# Patient Record
Sex: MALE | Race: WHITE
[De-identification: names, ages, dates, MRNs, and addresses within clinical notes are randomized per-mention and may not be internally consistent; named-entity substitution may affect disease eponyms.]

---

## 2017-11-07 ENCOUNTER — HOSPITAL ENCOUNTER (INPATIENT)
Dept: HOSPITAL 74 - JER | Age: 62
LOS: 7 days | Discharge: HOME HEALTH | DRG: 463 | End: 2017-11-14
Attending: FAMILY MEDICINE | Admitting: FAMILY MEDICINE
Payer: COMMERCIAL

## 2017-11-07 VITALS — BODY MASS INDEX: 19 KG/M2

## 2017-11-07 DIAGNOSIS — N39.0: Primary | ICD-10-CM

## 2017-11-07 DIAGNOSIS — N18.9: ICD-10-CM

## 2017-11-07 DIAGNOSIS — N17.9: ICD-10-CM

## 2017-11-07 DIAGNOSIS — L89.302: ICD-10-CM

## 2017-11-07 DIAGNOSIS — E11.22: ICD-10-CM

## 2017-11-07 DIAGNOSIS — E11.59: ICD-10-CM

## 2017-11-07 DIAGNOSIS — R33.9: ICD-10-CM

## 2017-11-07 DIAGNOSIS — L03.90: ICD-10-CM

## 2017-11-07 DIAGNOSIS — M47.14: ICD-10-CM

## 2017-11-07 DIAGNOSIS — E43: ICD-10-CM

## 2017-11-07 DIAGNOSIS — G47.00: ICD-10-CM

## 2017-11-07 DIAGNOSIS — B19.20: ICD-10-CM

## 2017-11-07 DIAGNOSIS — E78.5: ICD-10-CM

## 2017-11-07 DIAGNOSIS — M89.9: ICD-10-CM

## 2017-11-07 DIAGNOSIS — I12.9: ICD-10-CM

## 2017-11-07 DIAGNOSIS — F17.210: ICD-10-CM

## 2017-11-07 DIAGNOSIS — A49.02: ICD-10-CM

## 2017-11-07 LAB
ALBUMIN SERPL-MCNC: 3 G/DL (ref 3.4–5)
ALP SERPL-CCNC: 153 U/L (ref 45–117)
ALT SERPL-CCNC: 12 U/L (ref 12–78)
ANION GAP SERPL CALC-SCNC: 7 MMOL/L (ref 8–16)
APPEARANCE UR: (no result)
AST SERPL-CCNC: 14 U/L (ref 15–37)
BACTERIA #/AREA URNS HPF: (no result) /HPF
BASOPHILS # BLD: 0.9 % (ref 0–2)
BILIRUB SERPL-MCNC: 0.2 MG/DL (ref 0.2–1)
BILIRUB UR STRIP.AUTO-MCNC: NEGATIVE MG/DL
CALCIUM SERPL-MCNC: 8.6 MG/DL (ref 8.5–10.1)
CO2 SERPL-SCNC: 28 MMOL/L (ref 21–32)
COLOR UR: YELLOW
CREAT SERPL-MCNC: 1.8 MG/DL (ref 0.7–1.3)
DEPRECATED RDW RBC AUTO: 14 % (ref 11.9–15.9)
EOSINOPHIL # BLD: 1.5 % (ref 0–4.5)
GLUCOSE SERPL-MCNC: 157 MG/DL (ref 74–106)
KETONES UR QL STRIP: NEGATIVE
LEUKOCYTE ESTERASE UR QL STRIP.AUTO: (no result)
MCH RBC QN AUTO: 29.4 PG (ref 25.7–33.7)
MCHC RBC AUTO-ENTMCNC: 34.1 G/DL (ref 32–35.9)
MCV RBC: 86.3 FL (ref 80–96)
NEUTROPHILS # BLD: 63 % (ref 42.8–82.8)
NITRITE UR QL STRIP: NEGATIVE
PH UR: 6 [PH] (ref 5–8)
PLATELET # BLD AUTO: 233 K/MM3 (ref 134–434)
PMV BLD: 8 FL (ref 7.5–11.1)
PROT SERPL-MCNC: 7.2 G/DL (ref 6.4–8.2)
PROT UR QL STRIP: (no result)
PROT UR QL STRIP: NEGATIVE
RBC # UR STRIP: (no result) /UL
SP GR UR: 1.02 (ref 1–1.03)
UROBILINOGEN UR STRIP-MCNC: NEGATIVE MG/DL (ref 0.2–1)
WBC # BLD AUTO: 7.6 K/MM3 (ref 4–10)
WBC # UR AUTO: 70 /HPF (ref 3–5)
YEAST #/AREA URNS HPF: (no result) /[HPF]

## 2017-11-07 PROCEDURE — G0480 DRUG TEST DEF 1-7 CLASSES: HCPCS

## 2017-11-07 PROCEDURE — G0009 ADMIN PNEUMOCOCCAL VACCINE: HCPCS

## 2017-11-07 PROCEDURE — A9503 TC99M MEDRONATE: HCPCS

## 2017-11-07 RX ADMIN — ATORVASTATIN CALCIUM SCH MG: 10 TABLET, FILM COATED ORAL at 22:27

## 2017-11-07 RX ADMIN — POTASSIUM & SODIUM PHOSPHATES POWDER PACK 280-160-250 MG SCH PACKET: 280-160-250 PACK at 22:28

## 2017-11-07 RX ADMIN — HEPARIN SODIUM SCH UNIT: 5000 INJECTION, SOLUTION INTRAVENOUS; SUBCUTANEOUS at 22:27

## 2017-11-07 RX ADMIN — POTASSIUM & SODIUM PHOSPHATES POWDER PACK 280-160-250 MG SCH: 280-160-250 PACK at 22:54

## 2017-11-07 RX ADMIN — INSULIN DETEMIR SCH UNITS: 100 INJECTION, SOLUTION SUBCUTANEOUS at 22:27

## 2017-11-07 RX ADMIN — MAGNESIUM OXIDE TAB 400 MG (241.3 MG ELEMENTAL MG) SCH MG: 400 (241.3 MG) TAB at 22:28

## 2017-11-07 RX ADMIN — DOCUSATE SODIUM SCH MG: 100 CAPSULE, LIQUID FILLED ORAL at 22:27

## 2017-11-07 RX ADMIN — METHOCARBAMOL SCH MG: 500 TABLET ORAL at 22:28

## 2017-11-07 RX ADMIN — INSULIN ASPART SCH: 100 INJECTION, SOLUTION INTRAVENOUS; SUBCUTANEOUS at 22:26

## 2017-11-07 RX ADMIN — SODIUM CHLORIDE SCH MLS/HR: 9 INJECTION, SOLUTION INTRAVENOUS at 22:26

## 2017-11-07 RX ADMIN — PREGABALIN SCH MG: 50 CAPSULE ORAL at 22:27

## 2017-11-07 NOTE — PDOC
History of Present Illness





- General


Chief Complaint: Revisit, Lab Variance


Stated Complaint: PCP SENT FOR ADMIN


Time Seen by Provider: 11/07/17 13:35





- History of Present Illness


Initial Comments: 





11/07/17 14:25


The patient is a 62 year old male with a history of testicular ca, HTN, DM, HLD 

who presents from his primary care provider's office for admission.  The 

patient reports that he was sent from his primary's office for admission due to 

a UTI and creatinine elevation.  The patient was seen in the ER 1 day ago, but 

left AMA before the ED provider could get in contact with the patient's primary 

Dr. Noah Jimenes.  The patient is currently complaining of generalized abdominal 

pain worse in the suprapubic region.  He has a suprapubic catheter in place.  

CT scan performed 1 day ago in the ED demonstrated concern for possible 

metastatic disease.  He denies any fevers, chills, SOB, chest pain, nausea or 

vomiting.





Past History





- Past Medical History


Allergies/Adverse Reactions: 


 Allergies











Allergy/AdvReac Type Severity Reaction Status Date / Time


 


No Known Allergies Allergy   Verified 11/07/17 13:31











Home Medications: 


Ambulatory Orders





Lidocaine 5% Patch [Lidoderm -] 1 patch TP DAILY #14 patch 01/06/14 


Pregabalin [Lyrica -] 200 mg PO TID 12/04/14 


Amlodipine Besylate [Norvasc -] 10 mg PO DAILY 10/30/15 


Acetaminophen [Tylenol .Regular Strength -] 650 mg PO Q6H PRN #0 tablet 02/11/ 16 


Atorvastatin Ca [Lipitor] 10 mg PO HS #0 tablet 02/11/16 


Docusate Sodium [Colace -] 300 mg PO HS #0 capsule 02/11/16 


Magnesium Oxide [Mag-Ox -] 400 mg PO BID #0 tablet 02/11/16 


Methadone [Dolophine -] 90 mg PO DAILY@0600 #0 tablet 02/11/16 


Multivitamins [Multivit ([facility] Formul[facility])] 1 tab PO DAILY #0 tab 02/11/16 


Na Biphos/K Phos (Neutra-Phos) [Neutra-Phos Liquid -] 1 pkt PO BID #0 packet 02/ 11/16 


Insulin Glargine,Hum.rec.anlog [Lantus Solostar PEN (NF)] 35 units SQ HS 05/04/ 16 


Oxybutynin Chloride [Oxybutynin Chloride ER] 5 mg PO DAILY 05/04/16 


Methocarbamol [Robaxin -] 750 mg PO BID 05/31/16 


Cephalexin Monohydrate [Keflex -] 500 mg PO Q8H #21 capsule 11/07/17 








Anemia: No


Asthma: No


Cancer: Yes (TESTICULAR)


Cardiac Disorders: Yes


COPD: Yes


CHF: No


Diabetes: Yes


GI Disorders: No


 Disorders: Yes (suprapubic tube )


HTN: Yes


Liver Disease: No


Seizures: No





- Surgical History


Orthopedic Surgery: Yes (back surgery 2006)





- Immunization History


Td Vaccination: Yes


TDAP Vaccination: Yes


Immunization Up to Date: Yes





- Suicide/Smoking/Psychosocial Hx


Smoking Status: Yes


Smoking History: Current every day smoker


Years of Tobacco Use: 0


Have you smoked in the past 12 months: Yes


Number of Cigarettes Smoked Daily: 10


Cigars Per Day: 2


Information on smoking cessation initiated: No


'Breaking Loose' booklet given: 11/25/15


Hx Alcohol Use: No


Drug/Substance Use Hx: No


Substance Use Type: Alcohol, Cocaine, Marijuana


Hx Substance Use Treatment: Yes (detox, rehab, [facility])





**Review of Systems





- Review of Systems


Comments:: 





11/07/17 14:30


Constitutional: No fevers, chills, fatigue, malaise


HEENT: No Rhinorrhea, nasal congestion,


Cardiovascular: No chest pain, syncope, palpitations, lightheadedness


Respiratory: No Cough, SOB, Hemoptysis,


Gastrointestinal: Abdominal discomfort.  No Nausea, Vomiting, Constipation, 

Diarrhea, Melena


Genitourinary: No Dysuria, Frequency, Urgency, Hesitancy, Hematuria, Flank pain


Musculoskeletal: No Myalgia, arthralgia


Skin: No rashes, bruising, pallor


Neurologic: No Headache, Dizziness, Numbness, Weakness, or Tingling


Psychiatric: No Hallucinations. No SI or HI








*Physical Exam





- Vital Signs


 Last Vital Signs











Temp Pulse Resp BP Pulse Ox


 


 98.9 F   90   18   136/73   98 


 


 11/07/17 13:31  11/07/17 13:31  11/07/17 13:31  11/07/17 13:31  11/07/17 13:31














- Physical Exam


Comments: 





11/07/17 14:32


General Appearance: Nourished. No Apparent Distress


HEENT: EOMI, MORRO. No Pharyngeal Erythema, Tonsillar Exudate, Tonsillar Erythema


Neck: No Cervical Lymphadenopathy


Respiratory/Chest: Lungs Clear, Normal Breath Sounds. No Crackles, Rales, 

Rhonchi, Wheezing


Cardiovascular: Regular Rhythm, Regular Rate. No Murmur, Gallops, Rubs


Gastrointestinal/Abdominal: Normal Bowel Sounds, Soft. Mild diffuse tenderness 

to palpation.  Suprapubic catheter in place.  4cm superfical ulcerated wound to 

the abdomen.  No Guarding, Rebound, 


Musculoskeletal: No CVA Tenderness


Extremity: Normal Capillary Refill


Integumentary: Normal Color, Dry, Warm


Neurologic:  Fully Oriented, Alert, Normal Mood/Affect, Normal Response, 





**Heart Score/ECG Review


  ** #1


ECG reviewed & interpreted by me at: 16:32


General ECG Interpretation: Sinus Rhythm, Normal Rate, Normal Intervals, No 

acute ischemic changes





ED Treatment Course





- LABORATORY


CBC & Chemistry Diagram: 


 11/07/17 14:45





 11/07/17 14:45





Medical Decision Making





- Medical Decision Making


11/07/17 14:34


The patient is a 62 year old male with a history of testicular ca, HTN, DM, HLD 

who presents from his primary care provider's office for admission.  Given the 

patient's complaints of suprapubic tenderness as well as a suprapubic catheter, 

it is possible the patient's symptoms are due to a UTI.  We have paged Dr. Jimenes to discuss the case with him and are awaiting call back.  In the meantime

, we will obtain a cbc, cmp, blood cultures, UA, urine cultures to evaluate 

further.  We Will continue to monitor and reassess.





11/07/17 16:16


CBC is unremarkable.  We discussed the case with Dr. Jimenes who informed us 

that he wanted the patient admitted for IV antibiotics after a urine culture 

and penile discharge culture grew MRSA only sensitive to IV antibiotics.  We 

discussed the case with Dr. Moreland who is covering for Dr. Banda who accepted 

the patient for admission.  We will start IV vanc here in the ER for management.





*DC/Admit/Observation/Transfer


Diagnosis at time of Disposition: 


 MRSA (methicillin resistant Staphylococcus aureus)





UTI (urinary tract infection)


Qualifiers:


 Urinary tract infection type: site unspecified Hematuria presence: without 

hematuria Qualified Code(s): N39.0 - Urinary tract infection, site not specified





- Discharge Dispostion


Condition at time of disposition: Stable


Admit: Yes

## 2017-11-07 NOTE — PDOC
Attending Attestation





- Resident


Resident Name: Jorge Graham





- ED Attending Attestation


I have performed the following: I have examined & evaluated the patient, The 

case was reviewed & discussed with the resident, I agree w/resident's findings 

& plan, Exceptions are as noted





- HPI


HPI: 





11/07/17 14:43


62-year-old male with multiple medical problems including history of MRSA sent 

from Dr. Александр Jimenes's office for admission and further antibiotics





- Physicial Exam


PE: 





11/07/17 14:43


afebrile. 


R abdominal wall superficial ulceration/chronic wound without cellulitis/abscess


suprapubic catheter


chronic LLE weakness 





- Medical Decision Making





11/07/17 14:44





63y/o M sent from PMD office for IV abx treatment of MRSA, known wounds to the 

abdomen. no f/c. 


labs, cultures


empiric vanco as per instructions


d/w Dr. Jimenes, planned admission

## 2017-11-08 LAB
ALBUMIN SERPL-MCNC: 2.9 G/DL (ref 3.4–5)
ALP SERPL-CCNC: 134 U/L (ref 45–117)
ALT SERPL-CCNC: 12 U/L (ref 12–78)
ANION GAP SERPL CALC-SCNC: 6 MMOL/L (ref 8–16)
AST SERPL-CCNC: 11 U/L (ref 15–37)
BASOPHILS # BLD: 0.3 % (ref 0–2)
BILIRUB SERPL-MCNC: 0.3 MG/DL (ref 0.2–1)
CALCIUM SERPL-MCNC: 8.4 MG/DL (ref 8.5–10.1)
CO2 SERPL-SCNC: 26 MMOL/L (ref 21–32)
CREAT SERPL-MCNC: 1.4 MG/DL (ref 0.7–1.3)
DEPRECATED RDW RBC AUTO: 14.1 % (ref 11.9–15.9)
EOSINOPHIL # BLD: 0.8 % (ref 0–4.5)
GLUCOSE SERPL-MCNC: 58 MG/DL (ref 74–106)
MCH RBC QN AUTO: 28.8 PG (ref 25.7–33.7)
MCHC RBC AUTO-ENTMCNC: 33.3 G/DL (ref 32–35.9)
MCV RBC: 86.5 FL (ref 80–96)
NEUTROPHILS # BLD: 76.4 % (ref 42.8–82.8)
PLATELET # BLD AUTO: 225 K/MM3 (ref 134–434)
PMV BLD: 7.7 FL (ref 7.5–11.1)
PROT SERPL-MCNC: 6.9 G/DL (ref 6.4–8.2)
WBC # BLD AUTO: 8.4 K/MM3 (ref 4–10)

## 2017-11-08 RX ADMIN — PREGABALIN SCH: 50 CAPSULE ORAL at 14:38

## 2017-11-08 RX ADMIN — HEPARIN SODIUM SCH UNIT: 5000 INJECTION, SOLUTION INTRAVENOUS; SUBCUTANEOUS at 21:32

## 2017-11-08 RX ADMIN — INSULIN ASPART SCH: 100 INJECTION, SOLUTION INTRAVENOUS; SUBCUTANEOUS at 07:53

## 2017-11-08 RX ADMIN — AMLODIPINE BESYLATE SCH MG: 10 TABLET ORAL at 10:57

## 2017-11-08 RX ADMIN — PREGABALIN SCH: 50 CAPSULE ORAL at 07:51

## 2017-11-08 RX ADMIN — INSULIN DETEMIR SCH UNITS: 100 INJECTION, SOLUTION SUBCUTANEOUS at 21:49

## 2017-11-08 RX ADMIN — PREGABALIN SCH MG: 50 CAPSULE ORAL at 21:31

## 2017-11-08 RX ADMIN — HEPARIN SODIUM SCH UNIT: 5000 INJECTION, SOLUTION INTRAVENOUS; SUBCUTANEOUS at 10:56

## 2017-11-08 RX ADMIN — DOCUSATE SODIUM SCH MG: 100 CAPSULE, LIQUID FILLED ORAL at 21:31

## 2017-11-08 RX ADMIN — MAGNESIUM OXIDE TAB 400 MG (241.3 MG ELEMENTAL MG) SCH MG: 400 (241.3 MG) TAB at 21:33

## 2017-11-08 RX ADMIN — METHOCARBAMOL SCH MG: 500 TABLET ORAL at 21:50

## 2017-11-08 RX ADMIN — ZOLPIDEM TARTRATE PRN MG: 5 TABLET, COATED ORAL at 21:50

## 2017-11-08 RX ADMIN — ACETAMINOPHEN PRN MG: 325 TABLET ORAL at 16:43

## 2017-11-08 RX ADMIN — METHADONE HYDROCHLORIDE SCH: 40 TABLET ORAL at 13:44

## 2017-11-08 RX ADMIN — ATORVASTATIN CALCIUM SCH MG: 10 TABLET, FILM COATED ORAL at 21:32

## 2017-11-08 RX ADMIN — MAGNESIUM OXIDE TAB 400 MG (241.3 MG ELEMENTAL MG) SCH MG: 400 (241.3 MG) TAB at 10:57

## 2017-11-08 RX ADMIN — OXYBUTYNIN CHLORIDE SCH MG: 5 TABLET ORAL at 10:57

## 2017-11-08 RX ADMIN — LIDOCAINE SCH PATCH: 50 PATCH TOPICAL at 10:57

## 2017-11-08 RX ADMIN — INSULIN ASPART SCH: 100 INJECTION, SOLUTION INTRAVENOUS; SUBCUTANEOUS at 21:49

## 2017-11-08 RX ADMIN — PREGABALIN SCH MG: 50 CAPSULE ORAL at 16:37

## 2017-11-08 RX ADMIN — INSULIN ASPART SCH: 100 INJECTION, SOLUTION INTRAVENOUS; SUBCUTANEOUS at 12:06

## 2017-11-08 RX ADMIN — SODIUM CHLORIDE SCH: 9 INJECTION, SOLUTION INTRAVENOUS at 18:01

## 2017-11-08 RX ADMIN — SODIUM CHLORIDE SCH MLS/HR: 9 INJECTION, SOLUTION INTRAVENOUS at 16:42

## 2017-11-08 RX ADMIN — ACETAMINOPHEN PRN MG: 325 TABLET ORAL at 22:04

## 2017-11-08 RX ADMIN — INSULIN ASPART SCH: 100 INJECTION, SOLUTION INTRAVENOUS; SUBCUTANEOUS at 16:47

## 2017-11-08 RX ADMIN — POTASSIUM & SODIUM PHOSPHATES POWDER PACK 280-160-250 MG SCH PACKET: 280-160-250 PACK at 21:33

## 2017-11-08 RX ADMIN — ZOLPIDEM TARTRATE PRN MG: 5 TABLET, COATED ORAL at 03:04

## 2017-11-08 RX ADMIN — POTASSIUM & SODIUM PHOSPHATES POWDER PACK 280-160-250 MG SCH PACKET: 280-160-250 PACK at 10:57

## 2017-11-08 RX ADMIN — METHOCARBAMOL SCH MG: 500 TABLET ORAL at 10:58

## 2017-11-08 NOTE — PN
Progress Note (short form)





- Note


Progress Note: 





ID consult dictated





very poor historian


refers history to his urologist- "ask him, I don't know"


states he saw him Monday and was told to come to ED


came to ED and had ct scan showing he is s/p prostatectomy with suspected  bony 

metastasis  of inferior pubic ramus





was told he had MRSA infection





no fevers


chronic pain for which he takes percocet


also on methadone





PMD - Dr Steel at [address] [place] [address]





I tried to reach Dr Jimenes office to discuss


unable to reach him





has a clean chronic shallow abdominal wound that he reports having had for years


some mild erythema surrounding the SPT





suggest


renal insufficiency- continue IVF


urology consult 


?mild cellulitis at SPT site


follow up cultures, vanco level in am


MRSA isolation to continue

















Problem List





- Problems


(1) Acute kidney failure


Code(s): N17.9 - ACUTE KIDNEY FAILURE, UNSPECIFIED   


Qualifiers: 


   Acute renal failure type: unspecified   Qualified Code(s): N17.9 - Acute 

kidney failure, unspecified   





(2) Cellulitis


Code(s): L03.90 - CELLULITIS, UNSPECIFIED   





(3) MRSA (methicillin resistant Staphylococcus aureus)


Code(s): A49.02 - METHICILLIN RESIS STAPH INFECTION, UNSP SITE

## 2017-11-08 NOTE — HP
Admitting History and Physical





- Primary Care Physician


PCP: Damaris Storey





- Admission


Chief Complaint: ABD PAIN/URINE SEPSIS


History of Present Illness: 





The patient is a 62 year old male with a history of testicular ca, HTN, DM, HLD 

who presents from his primary care provider's office for admission.  The 

patient reports that he was sent from his primary's office for admission due to 

a UTI and creatinine elevation.  The patient was seen in the ER 1 day ago, but 

left AMA before the ED provider could get in contact with the patient's primary 

Dr. Noah Jimenes.  The patient is currently complaining of generalized abdominal 

pain worse in the suprapubic region.  He has a suprapubic catheter in place.  

CT scan performed 1 day ago in the ED demonstrated concern for possible 

metastatic disease.  He denies any fevers, chills, SOB, chest pain, nausea or 

vomiting.





History Source: Patient, Medical Record





- Past Medical History


CNS: Yes: CVA


Cardiovascular: Yes: HTN


Pulmonary: Yes: COPD


Hepatobiliary: Yes: Hepatitis C


Renal/: Yes: Renal Inusuff


Infectious Disease: Yes: Other (hep C)


Endocrine: Yes: Diabetes Mellitus





- Smoking History


Smoking history: Current every day smoker


Have you smoked in the past 12 months: Yes


Aproximately how many cigarettes per day: 10





- Alcohol/Substance Use


Hx Alcohol Use: No





Home Medications





- Allergies


Allergies/Adverse Reactions: 


 Allergies











Allergy/AdvReac Type Severity Reaction Status Date / Time


 


No Known Allergies Allergy   Verified 11/07/17 13:31














- Home Medications


Home Medications: 


Ambulatory Orders





Lidocaine 5% Patch [Lidoderm -] 1 patch TP DAILY #14 patch 01/06/14 


Pregabalin [Lyrica -] 200 mg PO TID 12/04/14 


Amlodipine Besylate [Norvasc -] 10 mg PO DAILY 10/30/15 


Acetaminophen [Tylenol .Regular Strength -] 650 mg PO Q6H PRN #0 tablet 02/11/ 16 


Atorvastatin Ca [Lipitor] 10 mg PO HS #0 tablet 02/11/16 


Docusate Sodium [Colace -] 300 mg PO HS #0 capsule 02/11/16 


Magnesium Oxide [Mag-Ox -] 400 mg PO BID #0 tablet 02/11/16 


Methadone [Dolophine -] 90 mg PO DAILY@0600 #0 tablet 02/11/16 


Multivitamins [Multivit ([facility] Formul[facility])] 1 tab PO DAILY #0 tab 02/11/16 


Na Biphos/K Phos (Neutra-Phos) [Neutra-Phos Liquid -] 1 pkt PO BID #0 packet 02/ 11/16 


Insulin Glargine,Hum.rec.anlog [Lantus Solostar PEN (NF)] 35 units SQ HS 05/04/ 16 


Oxybutynin Chloride [Oxybutynin Chloride ER] 5 mg PO DAILY 05/04/16 


Methocarbamol [Robaxin -] 750 mg PO BID 05/31/16 


Cephalexin Monohydrate [Keflex -] 500 mg PO Q8H #21 capsule 11/07/17 











Family Disease History





- Family Disease History


Family Disease History: Heart Disease: Father (alcohol), Mother, CA: Father





Review of Systems





- Review of Systems


Constitutional: reports: Loss of Appetite, Weakness


Eyes: reports: No Symptoms


HENT: reports: No Symptoms


Neck: reports: No Symptoms


Cardiovascular: reports: No Symptoms


Respiratory: reports: No Symptoms


Gastrointestinal: reports: No Symptoms


Genitourinary: reports: Other


Musculoskeletal: reports: Back Pain


Integumentary: reports: No Symptoms


Neurological: reports: Pre-Existing Deficit, Weakness


Endocrine: reports: No Symptoms


Hematology/Lymphatic: reports: No Symptoms


Psychiatric: reports: Anxiety, Other





Physical Examination


Vital Signs: 


 Vital Signs











Temperature  97.8 F   11/08/17 08:13


 


Pulse Rate  67   11/08/17 08:13


 


Respiratory Rate  20   11/08/17 08:13


 


Blood Pressure  150/77   11/08/17 08:13


 


O2 Sat by Pulse Oximetry (%)  98   11/07/17 21:00











Constitutional: Yes: Mild Distress


Eyes: Yes: WNL


HENT: Yes: WNL


Neck: Yes: WNL


Cardiovascular: Yes: WNL


Respiratory: Yes: WNL


Gastrointestinal: Yes: WNL


Renal/: Yes: Other (SUPRAPUBIC CATH)


Musculoskeletal: Yes: Back Pain, Muscle Weakness


Extremities: Yes: Other


Edema: No


Peripheral Pulses WNL: Yes


Integumentary: Yes: WNL


Wound/Incision: Yes: Clean/Dry


Neurological: Yes: Pre-Existing Deficit


...Motor Strength: LLE, RLE


Psychiatric: Yes: Other


Labs: 


 CBC, BMP





 11/08/17 06:35 





 11/08/17 06:35 











Problem List





- Problems


(1) MRSA (methicillin resistant Staphylococcus aureus)


Code(s): A49.02 - METHICILLIN RESIS STAPH INFECTION, UNSP SITE   





(2) UTI (urinary tract infection)


Code(s): N39.0 - URINARY TRACT INFECTION, SITE NOT SPECIFIED   


Qualifiers: 


   Urinary tract infection type: site unspecified   Hematuria presence: without 

hematuria   Qualified Code(s): N39.0 - Urinary tract infection, site not 

specified   





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


Code(s): E11.59 - TYPE 2 DIABETES MELLITUS WITH OTH CIRCULATORY COMPLICATIONS; 

I70.209 - UNSP ATHSCL NATIVE ARTERIES OF EXTREMITIES, UNSP EXTREMITY   





(4) HTN (hypertension)


Code(s): I10 - ESSENTIAL (PRIMARY) HYPERTENSION   





(5) Hepatitis C infection


Code(s): B19.20 - UNSPECIFIED VIRAL HEPATITIS C WITHOUT HEPATIC COMA   


Qualifiers: 


   Viral hepatitis chronicity: chronic 








(7) Myelopathy of thoracic region


Code(s): M47.14 - OTHER SPONDYLOSIS WITH MYELOPATHY, THORACIC REGION   








Assessment/Plan





IV ABX PER ID


CHECK URINE AND BLOOD CULTURES


PAIN MEDS


PT


 F/U

## 2017-11-08 NOTE — CONS
DATE OF CONSULTATION:  

 

DATE OF DICTATION:  11/08/2017 

 

REQUESTING PHYSICIAN:  Cara Banda MD 

 

HISTORY:  This is a 62-year-old man with a chronic suprapubic tube.  He is followed

by Dr. Yony Jimenes.  He reports on Monday sent to the hospital by Dr. Jimenes for

elevated renal function and a MRSA UTI.  He was evaluated in the emergency room.  He

had a CAT scan done of his abdomen and pelvis that showed that he had some inferior

pubic ramus bony destruction and a question of possible bony metastases was raised. 

He left AMA.  He returned again the next day.  He reports he has chronic suprapubic

tube pain.  He is an extremely poor historian.  Any questions about his urologic

history he refers to his urologist, who he says has done 8 surgeries on him.  He has

no idea what the timings of these surgeries are.  He reports he has chronic

suprapubic discomfort.  He reports he has a chronic open wound on his abdomen where

he says he scratches himself constantly.  He notes that he is on methadone

maintenance.  He has chronic pain and he receives Ambien and Percocet.  He denies any

fever and chills.  He has a good appetite.  He has no complaints of diarrhea or chest

pain or shortness of breath.

 

PAST MEDICAL HISTORY:  Per the chart.  The patient is not forthcoming.  Is notable

for history of COPD, testicular cancer, coronary artery disease, hypertension,

diabetes, suprapubic tube of unclear duration.  He has a history of hepatitis C,

arthritis, thoracic myelopathy.  Additionally, the prior chart reads that he has a

self-inflicted abdominal wound.

 

PAST SURGICAL HISTORY:  Notable for left testicular removal for cancer in 2012,

suprapubic tube.  He has had back surgery and he has had a laminectomy.  He

apparently had a posterior fusion of L4-S1 and by CAT scan findings has had a

prostatectomy in the past.  

 

PSYCHIATRIC HISTORY:  Notable for depression and bipolar disorder.  

 

SOCIAL HISTORY:  He is .  He has a 25-year-old daughter.  He is an active

cigarette smoker.  He drinks alcohol and to me denies any substance use.  

 

ALLERGIES:  He has no known drug allergies.  

 

MEDICATIONS:  His current medications include Lyrica, oxybutynin, Nutrafos,

multivitamins, Robaxin, methadone, magnesium oxide, Lidoderm patch, insulin, Colace,

atorvastatin, Norvasc, and apparently Keflex.  

 

PRIMARY CARE PHYSICIAN:  Joy Copeland MD, at [address] [place] [address].

 

He ambulates wearing a brace on his leg as well as using a rolling walker.  

 

PHYSICAL EXAMINATION:  

Vital Signs:  His temperature is 97.8.  Pulse is 67.  Blood pressure 150/77. 

Respiratory rate is 20.  He is saturating 98%. 

HEENT:  He is normocephalic.  His eyes are anicteric.  He has no thrush.

Neck:  Supple.  

Lungs:  Clear to auscultation.

Heart:  Regular rate and rhythm.

Abdomen:  Soft.  He has a shallow open ulcer on his midabdomen.  It is very clean. 

He says he scratches himself.  He has had it for a very long time and it never heals.

 He has a suprapubic tube with some minimal surrounding erythema.  There is no

purulence.  He is wearing a diaper.  He reports he wears one at home as well, as he

says he has leakage of urine.  

Skin:  He has no other open skin sores. He has a well-healed laminectomy scar in his

lower back. 

Extremities:  Without edema.  

 

LABORATORY:  White count is 8.4.  Hemoglobin is 13.6.  Platelets are 225.  BUN and

creatinine 21 and 1.8 with an alkaline phosphatase of 153.  Urinalysis is 3+

leukocytes with 70 white cells.

 

SUMMARY:  This is a 62-year-old man, extremely poor historian, with what appears to

be a prostatectomy with suspected bony metastases of the inferior pubic ramus,

history of testicular cancer, was told he had a MRSA infection but has no fever, and

I am not sure what the source is or if indeed he truly has an infection.  He does

have renal insufficiency for which I would continue IV fluids.  He received

vancomycin in the emergency room, would check a level in the morning.  Would continue

MRSA isolation as he apparently was MRSA positive as an outpatient, would recommend

Urology consult as well.  

 

 

BALBINA MERRITT M.D.

 

[id number]

DD: 11/08/2017 09:57

DT: 11/08/2017 10:54

Job #:  [id number]

## 2017-11-08 NOTE — CONSULT
Consult


Consult Specialty:: Nephrology


Reason for Consultation:: ROJELIO





- History of Present Illness


Chief Complaint: sent in for admission for cellulitis and renal failure


History of Present Illness: 





Pt is a 62 year old male with pmhx of CKD, ROJELIO, urinary obstruction with 

suprapubic cath, HTN, and DM who was sent in or admission. He was found to have 

elevated creatinine and I was called to evaluate him. He denies shortness of 

breath. He denies lower extremity edema. He denies nsaid use. He was also found 

to have erythema around suprapubic site. He does complains of discomfort from 

the suprapubic region. 





- History Source


History Provided By: Patient, Medical Record





- Past Medical History


CNS: Yes: CVA


Cardio/Vascular: Yes: HTN


Pulmonary: Yes: COPD


Hepatobiliary: Yes: Hepatitis C


Renal/: Yes: Renal Inusuff, Other (suprapubic cath)


Infectious Disease: Yes: Other (hep C)


Endocrine: Yes: Diabetes Mellitus





- Past Surgical History


Additional Surgical History: suprapubic cath





- Alcohol/Substance Use


Hx Alcohol Use: No





- Smoking History


Smoking history: Current every day smoker


Have you smoked in the past 12 months: Yes


Aproximately how many cigarettes per day: 10





- Social History


Usual Living Arrangement: Alone





Home Medications





- Allergies


Allergies/Adverse Reactions: 


 Allergies











Allergy/AdvReac Type Severity Reaction Status Date / Time


 


No Known Allergies Allergy   Verified 11/07/17 13:31














- Home Medications


Home Medications: 


Ambulatory Orders





Lidocaine 5% Patch [Lidoderm -] 1 patch TP DAILY #14 patch 01/06/14 


Pregabalin [Lyrica -] 200 mg PO TID 12/04/14 


Amlodipine Besylate [Norvasc -] 10 mg PO DAILY 10/30/15 


Acetaminophen [Tylenol .Regular Strength -] 650 mg PO Q6H PRN #0 tablet 02/11/ 16 


Atorvastatin Ca [Lipitor] 10 mg PO HS #0 tablet 02/11/16 


Docusate Sodium [Colace -] 300 mg PO HS #0 capsule 02/11/16 


Magnesium Oxide [Mag-Ox -] 400 mg PO BID #0 tablet 02/11/16 


Methadone [Dolophine -] 90 mg PO DAILY@0600 #0 tablet 02/11/16 


Multivitamins [Multivit ([facility] Formulary)] 1 tab PO DAILY #0 tab 02/11/16 


Na Biphos/K Phos (Neutra-Phos) [Neutra-Phos Liquid -] 1 pkt PO BID #0 packet 02/ 11/16 


Insulin Glargine,Hum.rec.anlog [Lantus Solostar PEN (NF)] 35 units SQ HS 05/04/ 16 


Oxybutynin Chloride [Oxybutynin Chloride ER] 5 mg PO DAILY 05/04/16 


Methocarbamol [Robaxin -] 750 mg PO BID 05/31/16 


Cephalexin Monohydrate [Keflex -] 500 mg PO Q8H #21 capsule 11/07/17 











Family Disease History





- Family Disease History


Family Disease History: Heart Disease: Father (alcohol), Mother, CA: Father





Review of Systems





- Review of Systems


Constitutional: reports: Malaise


Eyes: reports: No Symptoms


HENT: reports: No Symptoms


Neck: reports: No Symptoms


Cardiovascular: reports: No Symptoms


Respiratory: reports: No Symptoms


Gastrointestinal: reports: No Symptoms


Genitourinary: reports: Other (erythema around suprapubic site)


Musculoskeletal: reports: Joint Pain


Neurological: reports: No Symptoms


Endocrine: reports: No Symptoms


Hematology/Lymphatic: reports: No Symptoms


Psychiatric: reports: No Symptoms





Physical Exam


Vital Signs: 


 Vital Signs











Temperature  97.8 F   11/08/17 08:13


 


Pulse Rate  67   11/08/17 08:13


 


Respiratory Rate  20   11/08/17 08:13


 


Blood Pressure  150/77   11/08/17 08:13


 


O2 Sat by Pulse Oximetry (%)  98   11/07/17 21:00











Constitutional: Yes: Calm


Eyes: Yes: Conjunctiva Clear


HENT: Yes: Atraumatic


Neck: Yes: Supple


Cardiovascular: Yes: S1, S2


Respiratory: Yes: CTA Bilaterally


Gastrointestinal: Yes: Soft


Renal/: Yes: Other (suprapubic cath, mild erythema)


Musculoskeletal: Yes: WNL


Edema: No


Neurological: Yes: Oriented


Psychiatric: Yes: Oriented


Labs: 


 CBC, BMP





 11/08/17 06:35 





 11/08/17 06:35 





 Laboratory Tests











  02/02/16 02/06/16 02/07/16





  06:30 08:15 06:00


 


WBC   


 


Hgb   


 


Sodium   


 


Potassium   


 


Chloride   


 


Carbon Dioxide   


 


Anion Gap   


 


BUN   


 


Creatinine  1.1  1.2  1.3


 


Urine Color   


 


Urine Appearance   


 


Urine pH   


 


Ur Specific Gravity   


 


Urine Protein   


 


Urine Glucose (UA)   


 


Urine Ketones   


 


Urine Blood   


 


Urine Nitrite   


 


Urine Bilirubin   


 


Urine Urobilinogen   


 


Ur Leukocyte Esterase   


 


Urine RBC   














  02/10/16 02/11/16 11/06/17





  06:20 07:00 15:14


 


WBC   


 


Hgb   


 


Sodium   


 


Potassium   


 


Chloride   


 


Carbon Dioxide   


 


Anion Gap   


 


BUN   


 


Creatinine  1.7 H D  1.4 H  1.2


 


Urine Color   


 


Urine Appearance   


 


Urine pH   


 


Ur Specific Gravity   


 


Urine Protein   


 


Urine Glucose (UA)   


 


Urine Ketones   


 


Urine Blood   


 


Urine Nitrite   


 


Urine Bilirubin   


 


Urine Urobilinogen   


 


Ur Leukocyte Esterase   


 


Urine RBC   














  11/07/17 11/07/17 11/08/17





  14:45 17:40 06:35


 


WBC    8.4


 


Hgb    13.6


 


Sodium   


 


Potassium   


 


Chloride   


 


Carbon Dioxide   


 


Anion Gap   


 


BUN  21 H D  


 


Creatinine  1.8 H D  


 


Urine Color   Yellow 


 


Urine Appearance   Slcloudy 


 


Urine pH   6.0 


 


Ur Specific Gravity   1.016 


 


Urine Protein   2+ H 


 


Urine Glucose (UA)   Negative 


 


Urine Ketones   Negative 


 


Urine Blood   1+ H 


 


Urine Nitrite   Negative 


 


Urine Bilirubin   Negative 


 


Urine Urobilinogen   Negative 


 


Ur Leukocyte Esterase   3+ H 


 


Urine RBC   40-60 














  11/08/17





  06:35


 


WBC 


 


Hgb 


 


Sodium  139


 


Potassium  3.7


 


Chloride  107


 


Carbon Dioxide  26


 


Anion Gap  6 L


 


BUN  19 H


 


Creatinine  1.4 H D


 


Urine Color 


 


Urine Appearance 


 


Urine pH 


 


Ur Specific Gravity 


 


Urine Protein 


 


Urine Glucose (UA) 


 


Urine Ketones 


 


Urine Blood 


 


Urine Nitrite 


 


Urine Bilirubin 


 


Urine Urobilinogen 


 


Ur Leukocyte Esterase 


 


Urine RBC 














Imaging





- Results


Cat Scan: Report Reviewed





Problem List





- Problems


(1) Cellulitis


Code(s): L03.90 - CELLULITIS, UNSPECIFIED   





(2) MRSA (methicillin resistant Staphylococcus aureus)


Code(s): A49.02 - METHICILLIN RESIS STAPH INFECTION, UNSP SITE   





(3) Acute kidney failure


Code(s): N17.9 - ACUTE KIDNEY FAILURE, UNSPECIFIED   


Qualifiers: 


   Acute renal failure type: unspecified   Qualified Code(s): N17.9 - Acute 

kidney failure, unspecified   





Assessment/Plan





 Current Medications











Generic Name Dose Route Start Last Admin





  Trade Name Freq  PRN Reason Stop Dose Admin


 


Acetaminophen  650 mg  11/08/17 11:26  11/08/17 16:43





  Tylenol -  PO   650 mg





  Q6H PRN   Administration





  PAIN   


 


Amlodipine Besylate  10 mg  11/08/17 10:00  11/08/17 10:57





  Norvasc -  PO   10 mg





  DAILY JOHN   Administration


 


Atorvastatin Calcium  10 mg  11/07/17 22:00  11/07/17 22:27





  Lipitor -  PO   10 mg





  HS JOHN   Administration


 


Docusate Sodium  300 mg  11/07/17 22:00  11/07/17 22:27





  Colace -  PO   300 mg





  HS JOHN   Administration


 


Heparin Sodium (Porcine)  5,000 unit  11/07/17 22:00  11/08/17 10:56





  Heparin -  SQ   5,000 unit





  BID JOHN   Administration


 


Sodium Chloride  1,000 mls @ 75 mls/hr  11/07/17 18:00  11/08/17 18:01





  Normal Saline -  IV   Not Given





  ASDIR [facility]   


 


Insulin Aspart  1 vial  11/07/17 22:00  11/08/17 16:47





  Novolog Vial Sliding Scale -  SQ   Not Given





  ACHS [facility]   





  Protocol   


 


Insulin Detemir  35 units  11/07/17 22:00  11/07/17 22:27





  Levemir Vial  SQ   35 units





  HS JOHN   Administration


 


Lidocaine  1 patch  11/08/17 10:00  11/08/17 10:57





  Lidoderm Patch -  TP   1 patch





  DAILY JOHN   Administration


 


Magnesium Oxide  400 mg  11/07/17 22:00  11/08/17 10:57





  Mag-Ox -  PO   400 mg





  BID JOHN   Administration


 


Methadone HCl 80 mg/ Methadone  90 mg  11/08/17 12:30  11/08/17 13:44





  HCl 10 mg  PO   Not Given





  DAILY@0600 [facility]   


 


Methocarbamol  750 mg  11/07/17 22:00  11/08/17 10:58





  Robaxin -  PO   750 mg





  BID JOHN   Administration


 


Oxybutynin Chloride  5 mg  11/08/17 10:00  11/08/17 10:57





  Ditropan -  PO   5 mg





  DAILY JOHN   Administration


 


Oxycodone HCl  10 mg  11/08/17 11:26  11/08/17 16:43





  Roxicodone -  PO   10 mg





  Q6H PRN   Administration





  PAIN   


 


Potassium Phos/Sodium Phos  1 packet  11/07/17 22:00  11/08/17 10:57





  Phos-Nak Packet -  PO   1 packet





  BID JOHN   Administration


 


Pregabalin  200 mg  11/07/17 22:00  11/08/17 16:37





  Lyrica -  PO   200 mg





  TID JOHN   Administration


 


Zolpidem Tartrate  5 mg  11/08/17 02:31  11/08/17 03:04





  Ambien -  PO   5 mg





  HS PRN   Administration





  INSOMNIA   











Impression


1. ROJELIO


2. hx CKD


3. DM


4. cellulitis 


5. narcotic dependence


6. insomnia


7. hyperlipidemia


8. urinary retention s/p suprapubic cath


9. hx anemia


10 Hep C


11. nephrolithiasis


12. right side hydro





Plan


- renal function is improving


- cont with fluids


- likely etiology of rojelio is pre-renal disease


- pt does have hydro on right side


- urology follow up


- repeat labs in am


Dr Arango

## 2017-11-09 LAB
ANION GAP SERPL CALC-SCNC: 8 MMOL/L (ref 8–16)
CALCIUM SERPL-MCNC: 8.5 MG/DL (ref 8.5–10.1)
CO2 SERPL-SCNC: 24 MMOL/L (ref 21–32)
CREAT SERPL-MCNC: 1.3 MG/DL (ref 0.7–1.3)
GLUCOSE SERPL-MCNC: 42 MG/DL (ref 74–106)
RBC # BLD AUTO: (no result) /HPF (ref 0–3)

## 2017-11-09 RX ADMIN — METHOCARBAMOL SCH MG: 500 TABLET ORAL at 21:30

## 2017-11-09 RX ADMIN — ZOLPIDEM TARTRATE PRN MG: 5 TABLET, COATED ORAL at 21:29

## 2017-11-09 RX ADMIN — LIDOCAINE SCH PATCH: 50 PATCH TOPICAL at 10:21

## 2017-11-09 RX ADMIN — AMLODIPINE BESYLATE SCH MG: 10 TABLET ORAL at 10:20

## 2017-11-09 RX ADMIN — INSULIN ASPART SCH UNIT: 100 INJECTION, SOLUTION INTRAVENOUS; SUBCUTANEOUS at 06:35

## 2017-11-09 RX ADMIN — INSULIN ASPART SCH: 100 INJECTION, SOLUTION INTRAVENOUS; SUBCUTANEOUS at 22:08

## 2017-11-09 RX ADMIN — METHADONE HYDROCHLORIDE SCH MG: 40 TABLET ORAL at 06:27

## 2017-11-09 RX ADMIN — HEPARIN SODIUM SCH UNIT: 5000 INJECTION, SOLUTION INTRAVENOUS; SUBCUTANEOUS at 10:21

## 2017-11-09 RX ADMIN — MAGNESIUM OXIDE TAB 400 MG (241.3 MG ELEMENTAL MG) SCH MG: 400 (241.3 MG) TAB at 10:20

## 2017-11-09 RX ADMIN — PREGABALIN SCH MG: 50 CAPSULE ORAL at 14:51

## 2017-11-09 RX ADMIN — METHOCARBAMOL SCH MG: 500 TABLET ORAL at 10:20

## 2017-11-09 RX ADMIN — DOCUSATE SODIUM SCH MG: 100 CAPSULE, LIQUID FILLED ORAL at 21:27

## 2017-11-09 RX ADMIN — INSULIN ASPART SCH: 100 INJECTION, SOLUTION INTRAVENOUS; SUBCUTANEOUS at 14:22

## 2017-11-09 RX ADMIN — PREGABALIN SCH MG: 50 CAPSULE ORAL at 21:27

## 2017-11-09 RX ADMIN — PREGABALIN SCH MG: 50 CAPSULE ORAL at 06:28

## 2017-11-09 RX ADMIN — ACETAMINOPHEN PRN MG: 325 TABLET ORAL at 21:31

## 2017-11-09 RX ADMIN — POTASSIUM & SODIUM PHOSPHATES POWDER PACK 280-160-250 MG SCH PACKET: 280-160-250 PACK at 21:29

## 2017-11-09 RX ADMIN — HEPARIN SODIUM SCH UNIT: 5000 INJECTION, SOLUTION INTRAVENOUS; SUBCUTANEOUS at 21:29

## 2017-11-09 RX ADMIN — MAGNESIUM OXIDE TAB 400 MG (241.3 MG ELEMENTAL MG) SCH MG: 400 (241.3 MG) TAB at 21:29

## 2017-11-09 RX ADMIN — ATORVASTATIN CALCIUM SCH MG: 10 TABLET, FILM COATED ORAL at 21:29

## 2017-11-09 RX ADMIN — OXYBUTYNIN CHLORIDE SCH MG: 5 TABLET ORAL at 10:20

## 2017-11-09 RX ADMIN — POTASSIUM & SODIUM PHOSPHATES POWDER PACK 280-160-250 MG SCH PACKET: 280-160-250 PACK at 10:20

## 2017-11-09 RX ADMIN — INSULIN DETEMIR SCH UNITS: 100 INJECTION, SOLUTION SUBCUTANEOUS at 22:10

## 2017-11-09 RX ADMIN — ACETAMINOPHEN PRN MG: 325 TABLET ORAL at 10:42

## 2017-11-09 RX ADMIN — INSULIN ASPART SCH: 100 INJECTION, SOLUTION INTRAVENOUS; SUBCUTANEOUS at 16:57

## 2017-11-09 NOTE — PN
Progress Note, Physician


Chief Complaint: 





awake feeling better


bgm was low, but patient stated he feels good


repeat bgm 75





- Current Medication List


Current Medications: 


Active Medications





Acetaminophen (Tylenol -)  650 mg PO Q6H PRN


   PRN Reason: PAIN


   Last Admin: 11/09/17 10:42 Dose:  650 mg


Amlodipine Besylate (Norvasc -)  10 mg PO DAILY [facility]


   Last Admin: 11/09/17 10:20 Dose:  10 mg


Atorvastatin Calcium (Lipitor -)  10 mg PO HS [facility]


   Last Admin: 11/08/17 21:32 Dose:  10 mg


Docusate Sodium (Colace -)  300 mg PO HS [facility]


   Last Admin: 11/08/17 21:31 Dose:  300 mg


Heparin Sodium (Porcine) (Heparin -)  5,000 unit SQ BID [facility]


   Last Admin: 11/09/17 10:21 Dose:  5,000 unit


Sodium Chloride (Normal Saline -)  1,000 mls @ 75 mls/hr IV ASDIR [facility]


   Last Admin: 11/08/17 18:01 Dose:  Not Given


Insulin Aspart (Novolog Vial Sliding Scale -)  1 vial SQ ACHS [facility]


   PRN Reason: Protocol


   Last Admin: 11/09/17 06:35 Dose:  7 unit


Insulin Detemir (Levemir Vial)  35 units SQ HS [facility]


   Last Admin: 11/08/17 21:49 Dose:  35 units


Lidocaine (Lidoderm Patch -)  1 patch TP DAILY [facility]


   Last Admin: 11/09/17 10:21 Dose:  1 patch


Magnesium Oxide (Mag-Ox -)  400 mg PO BID [facility]


   Last Admin: 11/09/17 10:20 Dose:  400 mg


Methadone HCl 80 mg/ Methadone (HCl 10 mg)  90 mg PO DAILY@0600 [facility]


   Last Admin: 11/09/17 06:27 Dose:  90 mg


Methocarbamol (Robaxin -)  750 mg PO BID [facility]


   Last Admin: 11/09/17 10:20 Dose:  750 mg


Oxybutynin Chloride (Ditropan -)  5 mg PO DAILY [facility]


   Last Admin: 11/09/17 10:20 Dose:  5 mg


Oxycodone HCl (Roxicodone -)  10 mg PO Q6H PRN


   PRN Reason: PAIN


   Last Admin: 11/09/17 10:41 Dose:  10 mg


Potassium Phos/Sodium Phos (Phos-Nak Packet -)  1 packet PO BID [facility]


   Last Admin: 11/09/17 10:20 Dose:  1 packet


Pregabalin (Lyrica -)  200 mg PO TID JOHN


   Last Admin: 11/09/17 06:28 Dose:  200 mg


Zolpidem Tartrate (Ambien -)  5 mg PO HS PRN


   PRN Reason: INSOMNIA


   Last Admin: 11/08/17 21:50 Dose:  5 mg











- Objective


Vital Signs: 


 Vital Signs











Temperature  98.1 F   11/09/17 06:00


 


Pulse Rate  71   11/09/17 06:00


 


Respiratory Rate  20   11/09/17 06:00


 


Blood Pressure  179/87   11/09/17 06:00


 


O2 Sat by Pulse Oximetry (%)  98   11/08/17 21:00











Constitutional: Yes: No Distress


Eyes: Yes: WNL


HENT: Yes: WNL


Neck: Yes: WNL


Cardiovascular: Yes: WNL


Respiratory: Yes: WNL


Gastrointestinal: Yes: WNL


Genitourinary: Yes: Anglin Present


Musculoskeletal: Yes: Back Pain


Extremities: Yes: WNL


Edema: No


Peripheral Pulses WNL: Yes


Integumentary: Yes: WNL


Wound/Incision: Yes: Clean/Dry


Neurological: Yes: WNL


...Motor Strength: WNL


Psychiatric: Yes: WNL


Labs: 


 CBC, BMP





 11/08/17 06:35 





 11/09/17 07:30 











Problem List





- Problems


(1) MRSA (methicillin resistant Staphylococcus aureus)


Code(s): A49.02 - METHICILLIN RESIS STAPH INFECTION, UNSP SITE   





(2) UTI (urinary tract infection)


Code(s): N39.0 - URINARY TRACT INFECTION, SITE NOT SPECIFIED   


Qualifiers: 


   Urinary tract infection type: site unspecified   Hematuria presence: without 

hematuria   Qualified Code(s): N39.0 - Urinary tract infection, site not 

specified   





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


Code(s): E11.59 - TYPE 2 DIABETES MELLITUS WITH OTH CIRCULATORY COMPLICATIONS; 

I70.209 - UNSP ATHSCL NATIVE ARTERIES OF EXTREMITIES, UNSP EXTREMITY   





(4) HTN (hypertension)


Code(s): I10 - ESSENTIAL (PRIMARY) HYPERTENSION   





(5) Hepatitis C infection


Code(s): B19.20 - UNSPECIFIED VIRAL HEPATITIS C WITHOUT HEPATIC COMA   


Qualifiers: 


   Viral hepatitis chronicity: chronic 








(7) Myelopathy of thoracic region


Code(s): M47.14 - OTHER SPONDYLOSIS WITH MYELOPATHY, THORACIC REGION   








Assessment/Plan





iv abx continue


repeat urine cx , previous contaminated


pain control


bgm ac/hs


ada 


ssi

## 2017-11-09 NOTE — PN
Progress Note, Physician


History of Present Illness: 





Pt seen and examined at bedside. He is awake and alert. He denies shortness of 

breath. 





- Current Medication List


Current Medications: 


Active Medications





Acetaminophen (Tylenol -)  650 mg PO Q6H PRN


   PRN Reason: PAIN


   Last Admin: 11/09/17 10:42 Dose:  650 mg


Amlodipine Besylate (Norvasc -)  10 mg PO DAILY [facility]


   Last Admin: 11/09/17 10:20 Dose:  10 mg


Atorvastatin Calcium (Lipitor -)  10 mg PO HS [facility]


   Last Admin: 11/08/17 21:32 Dose:  10 mg


Docusate Sodium (Colace -)  300 mg PO HS [facility]


   Last Admin: 11/08/17 21:31 Dose:  300 mg


Heparin Sodium (Porcine) (Heparin -)  5,000 unit SQ BID [facility]


   Last Admin: 11/09/17 10:21 Dose:  5,000 unit


Sodium Chloride (Normal Saline -)  1,000 mls @ 75 mls/hr IV ASDIR [facility]


   Last Admin: 11/08/17 18:01 Dose:  Not Given


Insulin Aspart (Novolog Vial Sliding Scale -)  1 vial SQ ACHS [facility]


   PRN Reason: Protocol


   Last Admin: 11/09/17 14:22 Dose:  Not Given


Insulin Detemir (Levemir Vial)  35 units SQ HS [facility]


   Last Admin: 11/08/17 21:49 Dose:  35 units


Lidocaine (Lidoderm Patch -)  1 patch TP DAILY [facility]


   Last Admin: 11/09/17 10:21 Dose:  1 patch


Magnesium Oxide (Mag-Ox -)  400 mg PO BID [facility]


   Last Admin: 11/09/17 10:20 Dose:  400 mg


Methadone HCl 80 mg/ Methadone (HCl 10 mg)  90 mg PO DAILY@0600 [facility]


   Last Admin: 11/09/17 06:27 Dose:  90 mg


Methocarbamol (Robaxin -)  750 mg PO BID [facility]


   Last Admin: 11/09/17 10:20 Dose:  750 mg


Oxybutynin Chloride (Ditropan -)  5 mg PO DAILY [facility]


   Last Admin: 11/09/17 10:20 Dose:  5 mg


Oxycodone HCl (Roxicodone -)  10 mg PO Q6H PRN


   PRN Reason: PAIN


   Last Admin: 11/09/17 10:41 Dose:  10 mg


Potassium Phos/Sodium Phos (Phos-Nak Packet -)  1 packet PO BID [facility]


   Last Admin: 11/09/17 10:20 Dose:  1 packet


Pregabalin (Lyrica -)  200 mg PO TID [facility]


   Last Admin: 11/09/17 14:51 Dose:  200 mg


Zolpidem Tartrate (Ambien -)  5 mg PO HS PRN


   PRN Reason: INSOMNIA


   Last Admin: 11/08/17 21:50 Dose:  5 mg











- Objective


Vital Signs: 


 Vital Signs











Temperature  98.2 F   11/09/17 15:11


 


Pulse Rate  80   11/09/17 15:11


 


Respiratory Rate  18   11/09/17 15:11


 


Blood Pressure  150/95   11/09/17 15:11


 


O2 Sat by Pulse Oximetry (%)  98   11/09/17 09:00











Constitutional: Yes: Calm


Eyes: Yes: Conjunctiva Clear


HENT: Yes: Atraumatic


Neck: Yes: Supple


Cardiovascular: Yes: S1, S2


Respiratory: Yes: CTA Bilaterally


Gastrointestinal: Yes: Normal Bowel Sounds, Soft


Genitourinary: Yes: Other (suprapubic cath)


Musculoskeletal: Yes: WNL


Edema: No


Integumentary: Yes: Tattoos


Neurological: Yes: Oriented


Psychiatric: Yes: Oriented


Labs: 


 CBC, BMP





 11/08/17 06:35 





 11/09/17 07:30 











Problem List





- Problems


(1) Cellulitis


Code(s): L03.90 - CELLULITIS, UNSPECIFIED   





(2) MRSA (methicillin resistant Staphylococcus aureus)


Code(s): A49.02 - METHICILLIN RESIS STAPH INFECTION, UNSP SITE   





(3) Acute kidney failure


Code(s): N17.9 - ACUTE KIDNEY FAILURE, UNSPECIFIED   


Qualifiers: 


   Acute renal failure type: unspecified   Qualified Code(s): N17.9 - Acute 

kidney failure, unspecified   





Assessment/Plan


 Current Medications











Generic Name Dose Route Start Last Admin





  Trade Name Freq  PRN Reason Stop Dose Admin


 


Acetaminophen  650 mg  11/08/17 11:26  11/09/17 10:42





  Tylenol -  PO   650 mg





  Q6H PRN   Administration





  PAIN   


 


Amlodipine Besylate  10 mg  11/08/17 10:00  11/09/17 10:20





  Norvasc -  PO   10 mg





  DAILY JOHN   Administration


 


Atorvastatin Calcium  10 mg  11/07/17 22:00  11/08/17 21:32





  Lipitor -  PO   10 mg





  HS JOHN   Administration


 


Docusate Sodium  300 mg  11/07/17 22:00  11/08/17 21:31





  Colace -  PO   300 mg





  HS JOHN   Administration


 


Heparin Sodium (Porcine)  5,000 unit  11/07/17 22:00  11/09/17 10:21





  Heparin -  SQ   5,000 unit





  BID JOHN   Administration


 


Sodium Chloride  1,000 mls @ 75 mls/hr  11/07/17 18:00  11/08/17 18:01





  Normal Saline -  IV   Not Given





  ASDIR [facility]   


 


Insulin Aspart  1 vial  11/07/17 22:00  11/09/17 14:22





  Novolog Vial Sliding Scale -  SQ   Not Given





  ACHS [facility]   





  Protocol   


 


Insulin Detemir  35 units  11/07/17 22:00  11/08/17 21:49





  Levemir Vial  SQ   35 units





  HS JOHN   Administration


 


Lidocaine  1 patch  11/08/17 10:00  11/09/17 10:21





  Lidoderm Patch -  TP   1 patch





  DAILY JOHN   Administration


 


Magnesium Oxide  400 mg  11/07/17 22:00  11/09/17 10:20





  Mag-Ox -  PO   400 mg





  BID JOHN   Administration


 


Methadone HCl 80 mg/ Methadone  90 mg  11/08/17 12:30  11/09/17 06:27





  HCl 10 mg  PO   90 mg





  DAILY@0600 JOHN   Administration


 


Methocarbamol  750 mg  11/07/17 22:00  11/09/17 10:20





  Robaxin -  PO   750 mg





  BID JOHN   Administration


 


Oxybutynin Chloride  5 mg  11/08/17 10:00  11/09/17 10:20





  Ditropan -  PO   5 mg





  DAILY JOHN   Administration


 


Oxycodone HCl  10 mg  11/08/17 11:26  11/09/17 10:41





  Roxicodone -  PO   10 mg





  Q6H PRN   Administration





  PAIN   


 


Potassium Phos/Sodium Phos  1 packet  11/07/17 22:00  11/09/17 10:20





  Phos-Nak Packet -  PO   1 packet





  BID JOHN   Administration


 


Pregabalin  200 mg  11/07/17 22:00  11/09/17 14:51





  Lyrica -  PO   200 mg





  TID JOHN   Administration


 


Zolpidem Tartrate  5 mg  11/08/17 02:31  11/08/17 21:50





  Ambien -  PO   5 mg





  HS PRN   Administration





  INSOMNIA   











Impression


1. ROJELIO


2. hx CKD


3. DM


4. cellulitis 


5. narcotic dependence


6. insomnia


7. hyperlipidemia


8. urinary retention s/p suprapubic cath


9. hx anemia


10 Hep C


11. nephrolithiasis


12. right side hydro





Plan


- renal function continues to improve


- repeat labs in am


- cont with fluids


- likely etiology of rojelio is pre-renal disease


- urology follow up





Dr Arango

## 2017-11-10 RX ADMIN — INSULIN ASPART SCH: 100 INJECTION, SOLUTION INTRAVENOUS; SUBCUTANEOUS at 06:06

## 2017-11-10 RX ADMIN — INSULIN DETEMIR SCH UNIT: 100 INJECTION, SOLUTION SUBCUTANEOUS at 21:45

## 2017-11-10 RX ADMIN — METHOCARBAMOL SCH MG: 500 TABLET ORAL at 10:55

## 2017-11-10 RX ADMIN — MAGNESIUM OXIDE TAB 400 MG (241.3 MG ELEMENTAL MG) SCH MG: 400 (241.3 MG) TAB at 10:55

## 2017-11-10 RX ADMIN — ACETAMINOPHEN PRN MG: 325 TABLET ORAL at 06:05

## 2017-11-10 RX ADMIN — MAGNESIUM OXIDE TAB 400 MG (241.3 MG ELEMENTAL MG) SCH MG: 400 (241.3 MG) TAB at 21:32

## 2017-11-10 RX ADMIN — AMLODIPINE BESYLATE SCH MG: 10 TABLET ORAL at 10:55

## 2017-11-10 RX ADMIN — LIDOCAINE SCH PATCH: 50 PATCH TOPICAL at 10:57

## 2017-11-10 RX ADMIN — LISINOPRIL SCH MG: 10 TABLET ORAL at 11:38

## 2017-11-10 RX ADMIN — PREGABALIN SCH MG: 50 CAPSULE ORAL at 21:31

## 2017-11-10 RX ADMIN — METHOCARBAMOL SCH MG: 500 TABLET ORAL at 21:32

## 2017-11-10 RX ADMIN — OXYBUTYNIN CHLORIDE SCH MG: 5 TABLET ORAL at 10:55

## 2017-11-10 RX ADMIN — ATORVASTATIN CALCIUM SCH MG: 10 TABLET, FILM COATED ORAL at 21:31

## 2017-11-10 RX ADMIN — PREGABALIN SCH MG: 50 CAPSULE ORAL at 16:22

## 2017-11-10 RX ADMIN — ACETAMINOPHEN PRN MG: 325 TABLET ORAL at 16:33

## 2017-11-10 RX ADMIN — HEPARIN SODIUM SCH UNIT: 5000 INJECTION, SOLUTION INTRAVENOUS; SUBCUTANEOUS at 21:30

## 2017-11-10 RX ADMIN — ACETAMINOPHEN PRN MG: 325 TABLET ORAL at 23:13

## 2017-11-10 RX ADMIN — INSULIN ASPART SCH: 100 INJECTION, SOLUTION INTRAVENOUS; SUBCUTANEOUS at 17:33

## 2017-11-10 RX ADMIN — POTASSIUM & SODIUM PHOSPHATES POWDER PACK 280-160-250 MG SCH PACKET: 280-160-250 PACK at 21:32

## 2017-11-10 RX ADMIN — INSULIN ASPART SCH: 100 INJECTION, SOLUTION INTRAVENOUS; SUBCUTANEOUS at 21:43

## 2017-11-10 RX ADMIN — HEPARIN SODIUM SCH UNIT: 5000 INJECTION, SOLUTION INTRAVENOUS; SUBCUTANEOUS at 10:57

## 2017-11-10 RX ADMIN — ZOLPIDEM TARTRATE PRN MG: 5 TABLET, COATED ORAL at 23:14

## 2017-11-10 RX ADMIN — METHADONE HYDROCHLORIDE SCH MG: 40 TABLET ORAL at 05:48

## 2017-11-10 RX ADMIN — INSULIN ASPART SCH: 100 INJECTION, SOLUTION INTRAVENOUS; SUBCUTANEOUS at 17:34

## 2017-11-10 RX ADMIN — POTASSIUM & SODIUM PHOSPHATES POWDER PACK 280-160-250 MG SCH PACKET: 280-160-250 PACK at 10:55

## 2017-11-10 RX ADMIN — PREGABALIN SCH MG: 50 CAPSULE ORAL at 05:48

## 2017-11-10 RX ADMIN — DOCUSATE SODIUM SCH MG: 100 CAPSULE, LIQUID FILLED ORAL at 21:30

## 2017-11-10 NOTE — EKG
Test Reason : 

Blood Pressure : ***/*** mmHG

Vent. Rate : 065 BPM     Atrial Rate : 065 BPM

   P-R Int : 156 ms          QRS Dur : 088 ms

    QT Int : 432 ms       P-R-T Axes : 032 054 022 degrees

   QTc Int : 449 ms

 

NORMAL SINUS RHYTHM

NORMAL ECG

WHEN COMPARED WITH ECG OF 27-JAN-2016 13:09,

NO SIGNIFICANT CHANGE WAS FOUND

Confirmed by NEPTALI YOUSSEF MD ([id number]) on 11/10/2017 11:21:27 AM

 

Referred By:             Confirmed By:NEPTALI YOUSSEF MD

## 2017-11-10 NOTE — PN
Progress Note (short form)





- Note


Progress Note: 





no fevers cultures negative





he is very agitated and is refusing exam





he has been afebrile since admission Vital Signs











 Period  Temp  Pulse  Resp  BP Sys/Roland  Pulse Ox


 


 Last 24 Hr  97.7 F-98.8 F  65-86  18-18  122-166/  98-98





 Microbiology





11/07/17 14:45   Blood - Peripheral Venous   Blood Culture - Preliminary


                            NO GROWTH OBTAINED AFTER 72 HOURS, INCUBATION TO 

CONTINUE


                            FOR 2 DAYS.


11/07/17 14:45   Blood - Peripheral Venous   Blood Culture - Preliminary


                            NO GROWTH OBTAINED AFTER 72 HOURS, INCUBATION TO 

CONTINUE


                            FOR 2 DAYS.


11/07/17 17:40   Urine - Urine Suprapubic   Urine Culture - Final


                            Contaminated: Please Repeat





 CBC, BMP





 11/08/17 06:35 





 11/10/17 12:38 











suggest


renal insufficiency-per renal





cultures negative


no role for antibiotics


abnl ct scan 11/7- findings d/w Dr Storey-management per PMD


please call back if needed 














Problem List





- Problems


(1) Acute kidney failure


Code(s): N17.9 - ACUTE KIDNEY FAILURE, UNSPECIFIED   


Qualifiers: 


   Acute renal failure type: unspecified   Qualified Code(s): N17.9 - Acute 

kidney failure, unspecified   





(2) Cellulitis


Code(s): L03.90 - CELLULITIS, UNSPECIFIED   





(3) MRSA (methicillin resistant Staphylococcus aureus)


Code(s): A49.02 - METHICILLIN RESIS STAPH INFECTION, UNSP SITE

## 2017-11-10 NOTE — CONS
DATE OF CONSULTATION:

 

DATE OF DICTATION:  11/10/2017

 

HISTORY OF PRESENT ILLNESS:  The patient is a 62-year-old male followed in our office

for a neurogenic bladder and recurrent urinary tract infections.  Patient has history

of testicular cancer 10 years ago, also is a hypertensive diabetic dyslipidemic.  He

was seen in the office on 11/07/2017.  He was found to have suprapubic tenderness and

a purulent pericatheter discharge.  Cultures done the day previously came back _____

resistant to multiple and all p.o. medications, sensitivity to gentamicin and

imipenem were the only 2 drugs that were effective.  The patient denies any fever or

chills.  He has no allergies.  He is on multiple medications including oxybutynin

extra strength 10 mg daily for bladder spasm.  He does have a 24-Chinese, 30-cc

suprapubic Anglin catheter that is patent and clean.  It has been recently changed. 

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 

Patient is also underwent multiple back surgeries and is presently wheelchair bound. 

He denies ethanol or tobacco use.  In the emergency room, his temperature was 98.9,

blood pressure 136/73.  His most recent laboratory reveals white count of 8.4,

hemoglobin and hematocrit of 13.6 and 40.9.  Chemistries revealed a normal glucose. 

Patient presently is being treated by Dr. Kim, who has patient on IV

antibiotics.  The patient is presently receiving vancomycin.  Would recommend a renal

pelvic ultrasound.  Will follow with you.  

 

 

HALEY [id number]

DD: 11/10/2017 13:42

DT: 11/10/2017 15:28

Job #:  [id number]

## 2017-11-10 NOTE — CONSULT
Consult


Consult Specialty:: Oncology


Referred by:: Dr. Storey





- History of Present Illness


Chief Complaint: ?? pelvic bone mets





- History Source


History Provided By: Patient, Medical Record


Limitations to Obtaining History: Poor Historian





- Past Medical History


CNS: Yes: CVA


Cardio/Vascular: Yes: HTN


Pulmonary: Yes: COPD


Hepatobiliary: Yes: Hepatitis C


Renal/: Yes: Renal Inusuff, Other (suprapubic cath)


Infectious Disease: Yes: Other (hep C)


Psych: Yes: Addictions


Musculoskeletal: Yes: Chronic low back pain


Endocrine: Yes: Diabetes Mellitus





- Past Surgical History


Additional Surgical History: suprapubic cath





- Alcohol/Substance Use


Hx Alcohol Use: No (6 pack  beer /week)


History of Substance Use: reports: Cocaine, Heroin, Marijuana





- Smoking History


Smoking history: Current every day smoker


Have you smoked in the past 12 months: Yes


Aproximately how many cigarettes per day: 10





- Social History


Usual Living Arrangement: Alone


Occupation: not working


Place of Birth: Other ([place])





Home Medications





- Allergies


Allergies/Adverse Reactions: 


 Allergies











Allergy/AdvReac Type Severity Reaction Status Date / Time


 


No Known Allergies Allergy   Verified 11/07/17 13:31














- Home Medications


Home Medications: 


Ambulatory Orders





Lidocaine 5% Patch [Lidoderm -] 1 patch TP DAILY #14 patch 01/06/14 


Pregabalin [Lyrica -] 200 mg PO TID 12/04/14 


Amlodipine Besylate [Norvasc -] 10 mg PO DAILY 10/30/15 


Acetaminophen [Tylenol .Regular Strength -] 650 mg PO Q6H PRN #0 tablet 02/11/ 16 


Atorvastatin Ca [Lipitor] 10 mg PO HS #0 tablet 02/11/16 


Docusate Sodium [Colace -] 300 mg PO HS #0 capsule 02/11/16 


Magnesium Oxide [Mag-Ox -] 400 mg PO BID #0 tablet 02/11/16 


Methadone [Dolophine -] 90 mg PO DAILY@0600 #0 tablet 02/11/16 


Multivitamins [Multivit ([facility] Formulary)] 1 tab PO DAILY #0 tab 02/11/16 


Na Biphos/K Phos (Neutra-Phos) [Neutra-Phos Liquid -] 1 pkt PO BID #0 packet 02/ 11/16 


Insulin Glargine,Hum.rec.anlog [Lantus Solostar PEN (NF)] 35 units SQ HS 05/04/ 16 


Oxybutynin Chloride [Oxybutynin Chloride ER] 5 mg PO DAILY 05/04/16 


Methocarbamol [Robaxin -] 750 mg PO BID 05/31/16 


Cephalexin Monohydrate [Keflex -] 500 mg PO Q8H #21 capsule 11/07/17 











Family Disease History





- Family Disease History


Family Disease History: Heart Disease: Mother, CA: Sister (uterine ca )





Review of Systems





- Review of Systems


Constitutional: reports: Malaise


Eyes: denies: Blurred Vision, Double Vision


HENT: denies: Difficult Swallowing


Neck: denies: Decreased ROM


Cardiovascular: denies: Chest Pain


Respiratory: denies: SOB on Exertion


Gastrointestinal: reports: Abdominal Pain


Genitourinary: reports: Other (right orchiectomy suprapubic catheter non 

obstructing kidney stone)


Musculoskeletal: reports: Back Pain


Integumentary: reports: Other (abdominal wall lesion skin slough)


Endocrine: reports: No Symptoms


Hematology/Lymphatic: reports: No Symptoms





Physical Exam


Vital Signs: 


 Vital Signs











Temperature  99.3 F   11/10/17 17:09


 


Pulse Rate  74   11/10/17 17:09


 


Respiratory Rate  18   11/10/17 17:09


 


Blood Pressure  166/104   11/10/17 17:09


 


O2 Sat by Pulse Oximetry (%)  98   11/10/17 09:00











Constitutional: Yes: Calm


Eyes: Yes: PERRL


HENT: Yes: Normocephalic, Other (dentures).  No: Epistaxis, Hoarseness, 

Tonsillar Exudate


Neck: Yes: Supple, Trachea Midline.  No: Lymphadenopathy


Cardiovascular: Yes: Regular Rate and Rhythm


Respiratory: Yes: Diminished


Gastrointestinal: Yes: Other (suprapubic breakdown; area of skin  breakdown 

abdominal wall)


Renal/: Yes: Other (not circumcised; absent right testicle; atrophic left 

testicle).  No: CVA Tenderness - Left, CVA Tenderness - Right


Musculoskeletal: Yes: Back Pain


Extremities: Yes: WNL.  No: Calf Tenderness


Edema: No


Integumentary: Yes: Other (tatoos)


Neurological: Yes: WNL


...Motor Strength: WNL


Psychiatric: Yes: Alert


Labs: 


 CBC, BMP





 11/08/17 06:35 





 11/10/17 12:38 











Imaging





- Results


Cat Scan: Report Reviewed





Problem List





- Problems


(1) Lesion of pelvic bone


Assessment/Plan: 


CT reports lesion of pelvic bones compatible with destruction and  ?? 

metastatic disease. 


Consider bone scan and MRI


Code(s): M89.9 - DISORDER OF BONE, UNSPECIFIED   





(2) MRSA (methicillin resistant Staphylococcus aureus)


Code(s): A49.02 - METHICILLIN RESIS STAPH INFECTION, UNSP SITE   





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


Code(s): E11.59 - TYPE 2 DIABETES MELLITUS WITH OTH CIRCULATORY COMPLICATIONS; 

I70.209 - UNSP ATHSCL NATIVE ARTERIES OF EXTREMITIES, UNSP EXTREMITY   





(4) HTN (hypertension)


Code(s): I10 - ESSENTIAL (PRIMARY) HYPERTENSION   





(5) Hepatitis C infection


Code(s): B19.20 - UNSPECIFIED VIRAL HEPATITIS C WITHOUT HEPATIC COMA   


Qualifiers: 


   Viral hepatitis chronicity: chronic 





Assessment/Plan





This patient is  an extremely poor historian, and much of history does not 

correlate with hospital data  available in past records. 





For instance: I asked him  if he ever took drugs--"No"was the response. 


Then at the end of the interview I asked why he was on methadone- He told me in 

the 80"s he took cocaine  ( coke) and heroin. He states he has been off heroin  

x  27 years)





I asked if he ever had cancer- He once again stated that he was not aware of 

having cancer. 


I asked whether he had testicular cancer,  and he once again stated that he had 

not had cancer, but had a large testicular "pus ball." 


They removed this testicular pus ball.


In multiple areas of his prior chart there is mention of testicular cancer, 

HOWEVER. 





I HAVE TO CONCLUDE THEREFORE  THAT MUCH OF WHAT HE STATED TO ME IS NOT VALID. 


-THERE IS AN ABNORMAL CT REPORT AND THEREFORE WILL CONSIDER BONE SCAN AND MRI 

OF PELVIS


WILL ALSO CONSIDER  AFP, BETA HCG , 


IF ABNORMAL IMAGING , WILL LIKELY NEED BIOPSY OF PELVIC BONE

## 2017-11-10 NOTE — PN
Progress Note, Physician





- Current Medication List


Current Medications: 


Active Medications





Acetaminophen (Tylenol -)  650 mg PO Q6H PRN


   PRN Reason: PAIN


   Last Admin: 11/10/17 06:05 Dose:  650 mg


Amlodipine Besylate (Norvasc -)  10 mg PO DAILY [facility]


   Last Admin: 11/09/17 10:20 Dose:  10 mg


Atorvastatin Calcium (Lipitor -)  10 mg PO HS [facility]


   Last Admin: 11/09/17 21:29 Dose:  10 mg


Docusate Sodium (Colace -)  300 mg PO HS [facility]


   Last Admin: 11/09/17 21:27 Dose:  300 mg


Heparin Sodium (Porcine) (Heparin -)  5,000 unit SQ BID [facility]


   Last Admin: 11/09/17 21:29 Dose:  5,000 unit


Insulin Aspart (Novolog Vial Sliding Scale -)  1 vial SQ ACHS [facility]


   PRN Reason: Protocol


   Last Admin: 11/10/17 06:06 Dose:  Not Given


Insulin Detemir (Levemir Vial)  35 units SQ HS [facility]


   Last Admin: 11/09/17 22:10 Dose:  35 units


Lidocaine (Lidoderm Patch -)  1 patch TP DAILY [facility]


   Last Admin: 11/09/17 10:21 Dose:  1 patch


Magnesium Oxide (Mag-Ox -)  400 mg PO BID [facility]


   Last Admin: 11/09/17 21:29 Dose:  400 mg


Methadone HCl 80 mg/ Methadone (HCl 10 mg)  90 mg PO DAILY@0600 [facility]


   Last Admin: 11/10/17 05:48 Dose:  90 mg


Methocarbamol (Robaxin -)  750 mg PO BID [facility]


   Last Admin: 11/09/17 21:30 Dose:  750 mg


Oxybutynin Chloride (Ditropan -)  5 mg PO DAILY [facility]


   Last Admin: 11/09/17 10:20 Dose:  5 mg


Oxycodone HCl (Roxicodone -)  10 mg PO Q6H PRN


   PRN Reason: PAIN


   Last Admin: 11/10/17 06:05 Dose:  10 mg


Potassium Phos/Sodium Phos (Phos-Nak Packet -)  1 packet PO BID [facility]


   Last Admin: 11/09/17 21:29 Dose:  1 packet


Pregabalin (Lyrica -)  200 mg PO TID [facility]


   Last Admin: 11/10/17 05:48 Dose:  200 mg


Zolpidem Tartrate (Ambien -)  5 mg PO HS PRN


   PRN Reason: INSOMNIA


   Last Admin: 11/09/17 21:29 Dose:  5 mg











- Objective


Vital Signs: 


 Vital Signs











Temperature  97.7 F   11/10/17 06:00


 


Pulse Rate  73   11/10/17 06:00


 


Respiratory Rate  18   11/10/17 06:00


 


Blood Pressure  166/97   11/10/17 06:00


 


O2 Sat by Pulse Oximetry (%)  98   11/09/17 21:00











Labs: 


 CBC, BMP





 11/08/17 06:35 





 11/09/17 07:30 











Problem List





- Problems


(1) MRSA (methicillin resistant Staphylococcus aureus)


Code(s): A49.02 - METHICILLIN RESIS STAPH INFECTION, UNSP SITE   





(2) UTI (urinary tract infection)


Code(s): N39.0 - URINARY TRACT INFECTION, SITE NOT SPECIFIED   


Qualifiers: 


   Urinary tract infection type: site unspecified   Hematuria presence: without 

hematuria   Qualified Code(s): N39.0 - Urinary tract infection, site not 

specified   





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


Code(s): E11.59 - TYPE 2 DIABETES MELLITUS WITH OTH CIRCULATORY COMPLICATIONS; 

I70.209 - UNSP ATHSCL NATIVE ARTERIES OF EXTREMITIES, UNSP EXTREMITY   





(4) HTN (hypertension)


Code(s): I10 - ESSENTIAL (PRIMARY) HYPERTENSION   





(5) Hepatitis C infection


Code(s): B19.20 - UNSPECIFIED VIRAL HEPATITIS C WITHOUT HEPATIC COMA   


Qualifiers: 


   Viral hepatitis chronicity: chronic 








(7) Myelopathy of thoracic region


Code(s): M47.14 - OTHER SPONDYLOSIS WITH MYELOPATHY, THORACIC REGION

## 2017-11-10 NOTE — PN
Progress Note, Physician


Chief Complaint: 





AWAKE ALERT


NOTIFIED BY ID DR ROWAN PATIENT


HAD A CT ABD/PELVIS DONE PRIOR TO THIS ADMISSION


SHOWED PELVIC LYTIC LESION/BONE METS?





- Current Medication List


Current Medications: 


Active Medications





Acetaminophen (Tylenol -)  650 mg PO Q6H PRN


   PRN Reason: PAIN


   Last Admin: 11/10/17 06:05 Dose:  650 mg


Amlodipine Besylate (Norvasc -)  10 mg PO DAILY [facility]


   Last Admin: 11/10/17 10:55 Dose:  10 mg


Atorvastatin Calcium (Lipitor -)  10 mg PO HS [facility]


   Last Admin: 11/09/17 21:29 Dose:  10 mg


Docusate Sodium (Colace -)  300 mg PO HS [facility]


   Last Admin: 11/09/17 21:27 Dose:  300 mg


Heparin Sodium (Porcine) (Heparin -)  5,000 unit SQ BID [facility]


   Last Admin: 11/10/17 10:57 Dose:  5,000 unit


Sodium Chloride (1/2 Normal Saline)  1,000 mls @ 35 mls/hr IV ASDIR [facility]


Insulin Aspart (Novolog Vial Sliding Scale -)  1 vial SQ ACHS [facility]


   PRN Reason: Protocol


   Last Admin: 11/10/17 06:06 Dose:  Not Given


Insulin Detemir (Levemir Vial)  20 units SQ HS [facility]


Lidocaine (Lidoderm Patch -)  1 patch TP DAILY [facility]


   Last Admin: 11/10/17 10:57 Dose:  1 patch


Lisinopril (Prinivil)  10 mg PO DAILY [facility]


   Last Admin: 11/10/17 11:38 Dose:  10 mg


Magnesium Oxide (Mag-Ox -)  400 mg PO BID [facility]


   Last Admin: 11/10/17 10:55 Dose:  400 mg


Methadone HCl 80 mg/ Methadone (HCl 10 mg)  90 mg PO DAILY@0600 [facility]


   Last Admin: 11/10/17 05:48 Dose:  90 mg


Methocarbamol (Robaxin -)  750 mg PO BID [facility]


   Last Admin: 11/10/17 10:55 Dose:  750 mg


Oxybutynin Chloride (Ditropan -)  5 mg PO DAILY [facility]


   Last Admin: 11/10/17 10:55 Dose:  5 mg


Oxycodone HCl (Roxicodone -)  10 mg PO Q6H PRN


   PRN Reason: PAIN


   Last Admin: 11/10/17 06:05 Dose:  10 mg


Potassium Phos/Sodium Phos (Phos-Nak Packet -)  1 packet PO BID [facility]


   Last Admin: 11/10/17 10:55 Dose:  1 packet


Pregabalin (Lyrica -)  200 mg PO TID [facility]


   Last Admin: 11/10/17 16:22 Dose:  200 mg


Zolpidem Tartrate (Ambien -)  5 mg PO HS PRN


   PRN Reason: INSOMNIA


   Last Admin: 11/09/17 21:29 Dose:  5 mg











- Objective


Vital Signs: 


 Vital Signs











Temperature  97.9 F   11/10/17 14:00


 


Pulse Rate  79   11/10/17 14:00


 


Respiratory Rate  18   11/10/17 14:00


 


Blood Pressure  155/99   11/10/17 14:00


 


O2 Sat by Pulse Oximetry (%)  98   11/10/17 09:00











Constitutional: Yes: Mild Distress


Eyes: Yes: WNL


HENT: Yes: WNL


Neck: Yes: WNL


Cardiovascular: Yes: WNL


Respiratory: Yes: WNL


Gastrointestinal: Yes: WNL


Genitourinary: Yes: Anglin Present


Musculoskeletal: Yes: Muscle Weakness


Extremities: Yes: WNL


Edema: No


Peripheral Pulses WNL: Yes


Integumentary: Yes: WNL


Wound/Incision: Yes: Clean/Dry


Neurological: Yes: WNL


...Motor Strength: WNL


Psychiatric: Yes: WNL


Labs: 


 CBC, BMP





 11/08/17 06:35 





 11/10/17 12:38 











Problem List





- Problems


(1) MRSA (methicillin resistant Staphylococcus aureus)


Code(s): A49.02 - METHICILLIN RESIS STAPH INFECTION, UNSP SITE   





(2) UTI (urinary tract infection)


Code(s): N39.0 - URINARY TRACT INFECTION, SITE NOT SPECIFIED   


Qualifiers: 


   Urinary tract infection type: site unspecified   Hematuria presence: without 

hematuria   Qualified Code(s): N39.0 - Urinary tract infection, site not 

specified   





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


Code(s): E11.59 - TYPE 2 DIABETES MELLITUS WITH OTH CIRCULATORY COMPLICATIONS; 

I70.209 - UNSP ATHSCL NATIVE ARTERIES OF EXTREMITIES, UNSP EXTREMITY   





(4) HTN (hypertension)


Code(s): I10 - ESSENTIAL (PRIMARY) HYPERTENSION   





(5) Hepatitis C infection


Code(s): B19.20 - UNSPECIFIED VIRAL HEPATITIS C WITHOUT HEPATIC COMA   


Qualifiers: 


   Viral hepatitis chronicity: chronic 








(7) Myelopathy of thoracic region


Code(s): M47.14 - OTHER SPONDYLOSIS WITH MYELOPATHY, THORACIC REGION   








(9) Lesion of pelvic bone


Code(s): M89.9 - DISORDER OF BONE, UNSPECIFIED   





Assessment/Plan





ONCOLOGY CALLED FOR PELVIC LESION? NOV 6TH CT SCAN


IV ABX STOPPED


ID F/U APPRECIATED


MONITOR UNTIL ONCOLOGY WORKUP COMPLETED

## 2017-11-10 NOTE — PN
Progress Note, Physician


History of Present Illness: 





Pt seen and examined at bedside. He is awake and alert. He denies shortness of 

breath. He denies fevers or chills. 





- Current Medication List


Current Medications: 


Active Medications





Acetaminophen (Tylenol -)  650 mg PO Q6H PRN


   PRN Reason: PAIN


   Last Admin: 11/10/17 06:05 Dose:  650 mg


Amlodipine Besylate (Norvasc -)  10 mg PO DAILY [facility]


   Last Admin: 11/10/17 10:55 Dose:  10 mg


Atorvastatin Calcium (Lipitor -)  10 mg PO HS [facility]


   Last Admin: 11/09/17 21:29 Dose:  10 mg


Docusate Sodium (Colace -)  300 mg PO HS [facility]


   Last Admin: 11/09/17 21:27 Dose:  300 mg


Heparin Sodium (Porcine) (Heparin -)  5,000 unit SQ BID [facility]


   Last Admin: 11/10/17 10:57 Dose:  5,000 unit


Insulin Aspart (Novolog Vial Sliding Scale -)  1 vial SQ ACHS [facility]


   PRN Reason: Protocol


   Last Admin: 11/10/17 06:06 Dose:  Not Given


Insulin Detemir (Levemir Vial)  20 units SQ [facility]


Lidocaine (Lidoderm Patch -)  1 patch TP DAILY [facility]


   Last Admin: 11/10/17 10:57 Dose:  1 patch


Lisinopril (Prinivil)  10 mg PO DAILY [facility]


   Last Admin: 11/10/17 11:38 Dose:  10 mg


Magnesium Oxide (Mag-Ox -)  400 mg PO BID [facility]


   Last Admin: 11/10/17 10:55 Dose:  400 mg


Methadone HCl 80 mg/ Methadone (HCl 10 mg)  90 mg PO DAILY@0600 [facility]


   Last Admin: 11/10/17 05:48 Dose:  90 mg


Methocarbamol (Robaxin -)  750 mg PO BID [facility]


   Last Admin: 11/10/17 10:55 Dose:  750 mg


Oxybutynin Chloride (Ditropan -)  5 mg PO DAILY [facility]


   Last Admin: 11/10/17 10:55 Dose:  5 mg


Oxycodone HCl (Roxicodone -)  10 mg PO Q6H PRN


   PRN Reason: PAIN


   Last Admin: 11/10/17 06:05 Dose:  10 mg


Potassium Phos/Sodium Phos (Phos-Nak Packet -)  1 packet PO BID [facility]


   Last Admin: 11/10/17 10:55 Dose:  1 packet


Pregabalin (Lyrica -)  200 mg PO TID [facility]


   Last Admin: 11/10/17 05:48 Dose:  200 mg


Zolpidem Tartrate (Ambien -)  5 mg PO HS PRN


   PRN Reason: INSOMNIA


   Last Admin: 11/09/17 21:29 Dose:  5 mg











- Objective


Vital Signs: 


 Vital Signs











Temperature  97.9 F   11/10/17 14:00


 


Pulse Rate  79   11/10/17 14:00


 


Respiratory Rate  18   11/10/17 14:00


 


Blood Pressure  155/99   11/10/17 14:00


 


O2 Sat by Pulse Oximetry (%)  98   11/10/17 09:00











Constitutional: Yes: Calm


Eyes: Yes: Conjunctiva Clear


HENT: Yes: Atraumatic


Neck: Yes: Supple


Cardiovascular: Yes: S1, S2


Respiratory: Yes: CTA Bilaterally


Gastrointestinal: Yes: Normal Bowel Sounds, Soft


Genitourinary: Yes: Other (suprapubic cath)


Musculoskeletal: Yes: WNL


Edema: No


Neurological: Yes: Oriented


Psychiatric: Yes: Oriented


Labs: 


 CBC, BMP





 11/08/17 06:35 





 11/10/17 12:38 











Problem List





- Problems


(1) Cellulitis


Code(s): L03.90 - CELLULITIS, UNSPECIFIED   





(2) MRSA (methicillin resistant Staphylococcus aureus)


Code(s): A49.02 - METHICILLIN RESIS STAPH INFECTION, UNSP SITE   





(3) Acute kidney failure


Code(s): N17.9 - ACUTE KIDNEY FAILURE, UNSPECIFIED   


Qualifiers: 


   Acute renal failure type: unspecified   Qualified Code(s): N17.9 - Acute 

kidney failure, unspecified   





Assessment/Plan


 Current Medications











Generic Name Dose Route Start Last Admin





  Trade Name Freq  PRN Reason Stop Dose Admin


 


Acetaminophen  650 mg  11/08/17 11:26  11/10/17 06:05





  Tylenol -  PO   650 mg





  Q6H PRN   Administration





  PAIN   


 


Amlodipine Besylate  10 mg  11/08/17 10:00  11/10/17 10:55





  Norvasc -  PO   10 mg





  DAILY JOHN   Administration


 


Atorvastatin Calcium  10 mg  11/07/17 22:00  11/09/17 21:29





  Lipitor -  PO   10 mg





  HS JOHN   Administration


 


Docusate Sodium  300 mg  11/07/17 22:00  11/09/17 21:27





  Colace -  PO   300 mg





  HS JOHN   Administration


 


Heparin Sodium (Porcine)  5,000 unit  11/07/17 22:00  11/10/17 10:57





  Heparin -  SQ   5,000 unit





  BID JOHN   Administration


 


Insulin Aspart  1 vial  11/07/17 22:00  11/10/17 06:06





  Novolog Vial Sliding Scale -  SQ   Not Given





  [facility]   





  Protocol   


 


Insulin Detemir  20 units  11/10/17 22:00  





  Levemir Vial  SQ   





  HS [facility]   


 


Lidocaine  1 patch  11/08/17 10:00  11/10/17 10:57





  Lidoderm Patch -  TP   1 patch





  DAILY JOHN   Administration


 


Lisinopril  10 mg  11/10/17 11:00  11/10/17 11:38





  Prinivil  PO   10 mg





  DAILY JOHN   Administration


 


Magnesium Oxide  400 mg  11/07/17 22:00  11/10/17 10:55





  Mag-Ox -  PO   400 mg





  BID JOHN   Administration


 


Methadone HCl 80 mg/ Methadone  90 mg  11/08/17 12:30  11/10/17 05:48





  HCl 10 mg  PO   90 mg





  DAILY@0600 [facility]   Administration


 


Methocarbamol  750 mg  11/07/17 22:00  11/10/17 10:55





  Robaxin -  PO   750 mg





  BID JOHN   Administration


 


Oxybutynin Chloride  5 mg  11/08/17 10:00  11/10/17 10:55





  Ditropan -  PO   5 mg





  DAILY JOHN   Administration


 


Oxycodone HCl  10 mg  11/08/17 11:26  11/10/17 06:05





  Roxicodone -  PO   10 mg





  Q6H PRN   Administration





  PAIN   


 


Potassium Phos/Sodium Phos  1 packet  11/07/17 22:00  11/10/17 10:55





  Phos-Nak Packet -  PO   1 packet





  BID JOHN   Administration


 


Pregabalin  200 mg  11/07/17 22:00  11/10/17 05:48





  Lyrica -  PO   200 mg





  TID JOHN   Administration


 


Zolpidem Tartrate  5 mg  11/08/17 02:31  11/09/17 21:29





  Ambien -  PO   5 mg





  HS PRN   Administration





  INSOMNIA   











Impression


1. ROJELIO


2. hx CKD


3. DM


4. cellulitis 


5. narcotic dependence


6. insomnia


7. hyperlipidemia


8. urinary retention s/p suprapubic cath


9. hx anemia


10 Hep C


11. nephrolithiasis


12. right side hydro





Plan


- pt still has sediment in the urine


- will restart gentle hydration


- repeat labs in am


- cont abx per ID


- will fllow


- likely etiology of rojelio is pre-renal disease


- urology follow up





Dr Arango

## 2017-11-11 LAB
ALBUMIN SERPL-MCNC: 3 G/DL (ref 3.4–5)
ALP SERPL-CCNC: 116 U/L (ref 45–117)
ALT SERPL-CCNC: 11 U/L (ref 12–78)
ANION GAP SERPL CALC-SCNC: 6 MMOL/L (ref 8–16)
AST SERPL-CCNC: 13 U/L (ref 15–37)
BASOPHILS # BLD: 0.9 % (ref 0–2)
BILIRUB SERPL-MCNC: 0.4 MG/DL (ref 0.2–1)
CALCIUM SERPL-MCNC: 8.6 MG/DL (ref 8.5–10.1)
CO2 SERPL-SCNC: 27 MMOL/L (ref 21–32)
CREAT SERPL-MCNC: 1.5 MG/DL (ref 0.7–1.3)
DEPRECATED RDW RBC AUTO: 14.3 % (ref 11.9–15.9)
EOSINOPHIL # BLD: 2.1 % (ref 0–4.5)
GLUCOSE SERPL-MCNC: 81 MG/DL (ref 74–106)
MCH RBC QN AUTO: 28.7 PG (ref 25.7–33.7)
MCHC RBC AUTO-ENTMCNC: 32.9 G/DL (ref 32–35.9)
MCV RBC: 87.2 FL (ref 80–96)
NEUTROPHILS # BLD: 66 % (ref 42.8–82.8)
PLATELET # BLD AUTO: 176 K/MM3 (ref 134–434)
PMV BLD: 8.5 FL (ref 7.5–11.1)
PROT SERPL-MCNC: 7.1 G/DL (ref 6.4–8.2)
WBC # BLD AUTO: 6.7 K/MM3 (ref 4–10)

## 2017-11-11 RX ADMIN — POTASSIUM & SODIUM PHOSPHATES POWDER PACK 280-160-250 MG SCH PACKET: 280-160-250 PACK at 22:30

## 2017-11-11 RX ADMIN — OXYBUTYNIN CHLORIDE SCH MG: 5 TABLET ORAL at 10:35

## 2017-11-11 RX ADMIN — ATORVASTATIN CALCIUM SCH MG: 10 TABLET, FILM COATED ORAL at 22:30

## 2017-11-11 RX ADMIN — DOCUSATE SODIUM SCH MG: 100 CAPSULE, LIQUID FILLED ORAL at 22:29

## 2017-11-11 RX ADMIN — ACETAMINOPHEN PRN MG: 325 TABLET ORAL at 16:55

## 2017-11-11 RX ADMIN — INSULIN ASPART SCH UNIT: 100 INJECTION, SOLUTION INTRAVENOUS; SUBCUTANEOUS at 11:44

## 2017-11-11 RX ADMIN — INSULIN ASPART SCH: 100 INJECTION, SOLUTION INTRAVENOUS; SUBCUTANEOUS at 06:30

## 2017-11-11 RX ADMIN — HEPARIN SODIUM SCH UNIT: 5000 INJECTION, SOLUTION INTRAVENOUS; SUBCUTANEOUS at 10:34

## 2017-11-11 RX ADMIN — INSULIN DETEMIR SCH UNIT: 100 INJECTION, SOLUTION SUBCUTANEOUS at 22:29

## 2017-11-11 RX ADMIN — ACETAMINOPHEN PRN MG: 325 TABLET ORAL at 10:33

## 2017-11-11 RX ADMIN — INSULIN ASPART SCH: 100 INJECTION, SOLUTION INTRAVENOUS; SUBCUTANEOUS at 22:31

## 2017-11-11 RX ADMIN — HEPARIN SODIUM SCH UNIT: 5000 INJECTION, SOLUTION INTRAVENOUS; SUBCUTANEOUS at 22:31

## 2017-11-11 RX ADMIN — LISINOPRIL SCH MG: 10 TABLET ORAL at 10:35

## 2017-11-11 RX ADMIN — AMLODIPINE BESYLATE SCH MG: 10 TABLET ORAL at 10:35

## 2017-11-11 RX ADMIN — PREGABALIN SCH MG: 50 CAPSULE ORAL at 06:31

## 2017-11-11 RX ADMIN — POTASSIUM & SODIUM PHOSPHATES POWDER PACK 280-160-250 MG SCH PACKET: 280-160-250 PACK at 10:35

## 2017-11-11 RX ADMIN — MAGNESIUM OXIDE TAB 400 MG (241.3 MG ELEMENTAL MG) SCH MG: 400 (241.3 MG) TAB at 10:35

## 2017-11-11 RX ADMIN — LIDOCAINE SCH PATCH: 50 PATCH TOPICAL at 10:34

## 2017-11-11 RX ADMIN — MAGNESIUM OXIDE TAB 400 MG (241.3 MG ELEMENTAL MG) SCH MG: 400 (241.3 MG) TAB at 22:30

## 2017-11-11 RX ADMIN — METHOCARBAMOL SCH MG: 500 TABLET ORAL at 22:31

## 2017-11-11 RX ADMIN — PREGABALIN SCH MG: 50 CAPSULE ORAL at 13:37

## 2017-11-11 RX ADMIN — ZOLPIDEM TARTRATE PRN MG: 5 TABLET, COATED ORAL at 22:29

## 2017-11-11 RX ADMIN — METHOCARBAMOL SCH MG: 500 TABLET ORAL at 10:36

## 2017-11-11 RX ADMIN — INSULIN ASPART SCH: 100 INJECTION, SOLUTION INTRAVENOUS; SUBCUTANEOUS at 16:56

## 2017-11-11 RX ADMIN — METHADONE HYDROCHLORIDE SCH MG: 40 TABLET ORAL at 06:32

## 2017-11-11 RX ADMIN — PREGABALIN SCH MG: 50 CAPSULE ORAL at 22:30

## 2017-11-11 NOTE — PN
Progress Note, Physician


Chief Complaint: 





UTI


History of Present Illness: 





NAD





- Current Medication List


Current Medications: 


Active Medications





Acetaminophen (Tylenol -)  650 mg PO Q6H PRN


   PRN Reason: PAIN


   Last Admin: 11/11/17 16:55 Dose:  650 mg


Amlodipine Besylate (Norvasc -)  10 mg PO DAILY [facility]


   Last Admin: 11/11/17 10:35 Dose:  10 mg


Atorvastatin Calcium (Lipitor -)  10 mg PO HS [facility]


   Last Admin: 11/10/17 21:31 Dose:  10 mg


Docusate Sodium (Colace -)  300 mg PO HS [facility]


   Last Admin: 11/10/17 21:30 Dose:  300 mg


Heparin Sodium (Porcine) (Heparin -)  5,000 unit SQ BID [facility]


   Last Admin: 11/11/17 10:34 Dose:  5,000 unit


Insulin Aspart (Novolog Vial Sliding Scale -)  1 vial SQ ACHS [facility]


   PRN Reason: Protocol


   Last Admin: 11/11/17 16:56 Dose:  Not Given


Insulin Detemir (Levemir Vial)  20 units SQ HS [facility]


   Last Admin: 11/10/17 21:45 Dose:  20 unit


Lidocaine (Lidoderm Patch -)  1 patch TP DAILY [facility]


   Last Admin: 11/11/17 10:34 Dose:  1 patch


Lisinopril (Prinivil)  10 mg PO DAILY [facility]


   Last Admin: 11/11/17 10:35 Dose:  10 mg


Magnesium Oxide (Mag-Ox -)  400 mg PO BID [facility]


   Last Admin: 11/11/17 10:35 Dose:  400 mg


Methadone HCl 80 mg/ Methadone (HCl 10 mg)  90 mg PO DAILY@0600 [facility]


   Last Admin: 11/11/17 06:32 Dose:  90 mg


Methocarbamol (Robaxin -)  750 mg PO BID [facility]


   Last Admin: 11/11/17 10:36 Dose:  750 mg


Oxybutynin Chloride (Ditropan -)  5 mg PO DAILY [facility]


   Last Admin: 11/11/17 10:35 Dose:  5 mg


Oxycodone HCl (Roxicodone -)  10 mg PO Q6H PRN


   PRN Reason: PAIN


   Last Admin: 11/11/17 16:54 Dose:  10 mg


Potassium Phos/Sodium Phos (Phos-Nak Packet -)  1 packet PO BID [facility]


   Last Admin: 11/11/17 10:35 Dose:  1 packet


Pregabalin (Lyrica -)  200 mg PO TID [facility]


   Last Admin: 11/11/17 13:37 Dose:  200 mg


Zolpidem Tartrate (Ambien -)  5 mg PO HS PRN


   PRN Reason: INSOMNIA


   Last Admin: 11/10/17 23:14 Dose:  5 mg











- Objective


Vital Signs: 


 Vital Signs











Temperature  98.3 F   11/11/17 17:07


 


Pulse Rate  68   11/11/17 17:07


 


Respiratory Rate  20   11/11/17 17:07


 


Blood Pressure  148/79   11/11/17 17:07


 


O2 Sat by Pulse Oximetry (%)  97   11/11/17 09:00











Constitutional: Yes: Well Nourished, No Distress, Calm


Labs: 


 CBC, BMP





 11/11/17 06:00 





 11/11/17 06:00 











Problem List





- Problems


(1) Lesion of pelvic bone


Assessment/Plan: 


-CT reports lesion of pelvic bones compatible with destruction and metastatic 

disease. 


-bone scan


-oncology on board


Code(s): M89.9 - DISORDER OF BONE, UNSPECIFIED   





(2) Chronic kidney disease


Assessment/Plan: 


-renal consult appreciated





Code(s): N18.9 - CHRONIC KIDNEY DISEASE, UNSPECIFIED   





(3) MRSA (methicillin resistant Staphylococcus aureus)


Code(s): A49.02 - METHICILLIN RESIS STAPH INFECTION, UNSP SITE   





Assessment/Plan





see problem list

## 2017-11-11 NOTE — PN
Progress Note (short form)





- Note


Progress Note: 





RENAL


pt is awake and alert


states that he always has a urine leak


he is otherwise  ok


 Last Vital Signs











Temp Pulse Resp BP Pulse Ox


 


 98.4 F   69   18   159/91   97 


 


 11/11/17 06:00  11/11/17 06:00  11/11/17 06:00  11/11/17 06:00  11/10/17 21:00








lungs clear 


cvs s1s2 rr


abd soft


ext no edema


neuro a+ox2








 Current Medications











Generic Name Dose Route Start Last Admin





  Trade Name Freq  PRN Reason Stop Dose Admin


 


Acetaminophen  650 mg  11/08/17 11:26  11/11/17 10:33





  Tylenol -  PO   650 mg





  Q6H PRN   Administration





  PAIN   


 


Amlodipine Besylate  10 mg  11/08/17 10:00  11/11/17 10:35





  Norvasc -  PO   10 mg





  DAILY JOHN   Administration


 


Atorvastatin Calcium  10 mg  11/07/17 22:00  11/10/17 21:31





  Lipitor -  PO   10 mg





  HS JOHN   Administration


 


Docusate Sodium  300 mg  11/07/17 22:00  11/10/17 21:30





  Colace -  PO   300 mg





  HS JOHN   Administration


 


Heparin Sodium (Porcine)  5,000 unit  11/07/17 22:00  11/11/17 10:34





  Heparin -  SQ   5,000 unit





  BID JOHN   Administration


 


Sodium Chloride  1,000 mls @ 35 mls/hr  11/10/17 15:45  11/10/17 16:35





  1/2 Normal Saline  IV   35 mls/hr





  ASDIR JOHN   Administration


 


Insulin Aspart  1 vial  11/07/17 22:00  11/11/17 06:30





  Novolog Vial Sliding Scale -  SQ   Not Given





  [facility]   





  Protocol   


 


Insulin Detemir  20 units  11/10/17 22:00  11/10/17 21:45





  Levemir Vial  SQ   20 unit





  HS JOHN   Administration


 


Lidocaine  1 patch  11/08/17 10:00  11/11/17 10:34





  Lidoderm Patch -  TP   1 patch





  DAILY JOHN   Administration


 


Lisinopril  10 mg  11/10/17 11:00  11/11/17 10:35





  Prinivil  PO   10 mg





  DAILY JOHN   Administration


 


Magnesium Oxide  400 mg  11/07/17 22:00  11/11/17 10:35





  Mag-Ox -  PO   400 mg





  BID JOHN   Administration


 


Methadone HCl 80 mg/ Methadone  90 mg  11/08/17 12:30  11/11/17 06:32





  HCl 10 mg  PO   90 mg





  DAILY@0600 JOHN   Administration


 


Methocarbamol  750 mg  11/07/17 22:00  11/11/17 10:36





  Robaxin -  PO   750 mg





  BID JOHN   Administration


 


Oxybutynin Chloride  5 mg  11/08/17 10:00  11/11/17 10:35





  Ditropan -  PO   5 mg





  DAILY JOHN   Administration


 


Oxycodone HCl  10 mg  11/08/17 11:26  11/11/17 10:34





  Roxicodone -  PO   10 mg





  Q6H PRN   Administration





  PAIN   


 


Potassium Phos/Sodium Phos  1 packet  11/07/17 22:00  11/11/17 10:35





  Phos-Nak Packet -  PO   1 packet





  BID JOHN   Administration


 


Pregabalin  200 mg  11/07/17 22:00  11/11/17 06:31





  Lyrica -  PO   200 mg





  TID JOHN   Administration


 


Zolpidem Tartrate  5 mg  11/08/17 02:31  11/10/17 23:14





  Ambien -  PO   5 mg





  HS PRN   Administration





  INSOMNIA   








 CBC, BMP





 11/11/17 06:00 





 11/11/17 06:00 





Impression


1. ROJELIO


2. hx CKD


3. DM


4. cellulitis 


5. narcotic dependence


6. insomnia


7. hyperlipidemia


8. urinary retention s/p suprapubic cath


9. hx anemia


10 Hep C


11. nephrolithiasis


12. right side hydro





Plan


-continue hydration


- likely etiology of rojelio is pre-renal disease


- urology follow up





MV

## 2017-11-12 RX ADMIN — OXYBUTYNIN CHLORIDE SCH MG: 5 TABLET ORAL at 10:30

## 2017-11-12 RX ADMIN — AMLODIPINE BESYLATE SCH MG: 10 TABLET ORAL at 10:30

## 2017-11-12 RX ADMIN — POTASSIUM & SODIUM PHOSPHATES POWDER PACK 280-160-250 MG SCH PACKET: 280-160-250 PACK at 21:38

## 2017-11-12 RX ADMIN — LIDOCAINE SCH PATCH: 50 PATCH TOPICAL at 10:29

## 2017-11-12 RX ADMIN — INSULIN ASPART SCH: 100 INJECTION, SOLUTION INTRAVENOUS; SUBCUTANEOUS at 16:58

## 2017-11-12 RX ADMIN — MAGNESIUM OXIDE TAB 400 MG (241.3 MG ELEMENTAL MG) SCH MG: 400 (241.3 MG) TAB at 21:38

## 2017-11-12 RX ADMIN — ACETAMINOPHEN PRN MG: 325 TABLET ORAL at 16:14

## 2017-11-12 RX ADMIN — ATORVASTATIN CALCIUM SCH MG: 10 TABLET, FILM COATED ORAL at 21:37

## 2017-11-12 RX ADMIN — POTASSIUM & SODIUM PHOSPHATES POWDER PACK 280-160-250 MG SCH PACKET: 280-160-250 PACK at 10:30

## 2017-11-12 RX ADMIN — DOCUSATE SODIUM SCH MG: 100 CAPSULE, LIQUID FILLED ORAL at 21:36

## 2017-11-12 RX ADMIN — HEPARIN SODIUM SCH UNIT: 5000 INJECTION, SOLUTION INTRAVENOUS; SUBCUTANEOUS at 21:40

## 2017-11-12 RX ADMIN — INSULIN DETEMIR SCH: 100 INJECTION, SOLUTION SUBCUTANEOUS at 22:00

## 2017-11-12 RX ADMIN — METHADONE HYDROCHLORIDE SCH MG: 40 TABLET ORAL at 06:34

## 2017-11-12 RX ADMIN — INSULIN ASPART SCH: 100 INJECTION, SOLUTION INTRAVENOUS; SUBCUTANEOUS at 21:40

## 2017-11-12 RX ADMIN — HEPARIN SODIUM SCH: 5000 INJECTION, SOLUTION INTRAVENOUS; SUBCUTANEOUS at 10:30

## 2017-11-12 RX ADMIN — PREGABALIN SCH MG: 50 CAPSULE ORAL at 21:37

## 2017-11-12 RX ADMIN — LISINOPRIL SCH MG: 10 TABLET ORAL at 10:31

## 2017-11-12 RX ADMIN — PREGABALIN SCH MG: 50 CAPSULE ORAL at 14:26

## 2017-11-12 RX ADMIN — INSULIN ASPART SCH: 100 INJECTION, SOLUTION INTRAVENOUS; SUBCUTANEOUS at 12:15

## 2017-11-12 RX ADMIN — METHOCARBAMOL SCH MG: 500 TABLET ORAL at 10:31

## 2017-11-12 RX ADMIN — PREGABALIN SCH MG: 50 CAPSULE ORAL at 06:34

## 2017-11-12 RX ADMIN — MAGNESIUM OXIDE TAB 400 MG (241.3 MG ELEMENTAL MG) SCH MG: 400 (241.3 MG) TAB at 10:30

## 2017-11-12 RX ADMIN — ZOLPIDEM TARTRATE PRN MG: 5 TABLET, COATED ORAL at 21:42

## 2017-11-12 RX ADMIN — ACETAMINOPHEN PRN MG: 325 TABLET ORAL at 00:31

## 2017-11-12 RX ADMIN — METHOCARBAMOL SCH MG: 500 TABLET ORAL at 21:38

## 2017-11-12 RX ADMIN — INSULIN ASPART SCH: 100 INJECTION, SOLUTION INTRAVENOUS; SUBCUTANEOUS at 06:39

## 2017-11-12 RX ADMIN — ACETAMINOPHEN PRN MG: 325 TABLET ORAL at 10:37

## 2017-11-12 NOTE — PN
Progress Note (short form)





- Note


Progress Note: 





RENAL


pt is awake and alert


states that he always has a urine leak


he is otherwise  ok


 


 Last Vital Signs











Temp Pulse Resp BP Pulse Ox


 


 98.2 F   72   18   146/84   97 


 


 11/12/17 10:00  11/12/17 10:00  11/12/17 10:00  11/12/17 10:00  11/11/17 09:00

















lungs clear 


cvs s1s2 rr


abd soft


ext no edema


neuro a+ox2








 








 


 Current Medications











Generic Name Dose Route Start Last Admin





  Trade Name Freq  PRN Reason Stop Dose Admin


 


Acetaminophen  650 mg  11/08/17 11:26  11/12/17 10:37





  Tylenol -  PO   650 mg





  Q6H PRN   Administration





  PAIN   


 


Amlodipine Besylate  10 mg  11/08/17 10:00  11/12/17 10:30





  Norvasc -  PO   10 mg





  DAILY JOHN   Administration


 


Atorvastatin Calcium  10 mg  11/07/17 22:00  11/11/17 22:30





  Lipitor -  PO   10 mg





  HS JOHN   Administration


 


Docusate Sodium  300 mg  11/07/17 22:00  11/11/17 22:29





  Colace -  PO   300 mg





  HS JOHN   Administration


 


Heparin Sodium (Porcine)  5,000 unit  11/07/17 22:00  11/12/17 10:30





  Heparin -  SQ   Not Given





  [facility]   


 


Insulin Aspart  1 vial  11/07/17 22:00  11/12/17 06:39





  Novolog Vial Sliding Scale -  SQ   Not Given





  [facility]   





  Protocol   


 


Insulin Detemir  20 units  11/10/17 22:00  11/11/17 22:29





  Levemir Vial  SQ   20 unit





  HS JOHN   Administration


 


Lidocaine  1 patch  11/08/17 10:00  11/12/17 10:29





  Lidoderm Patch -  TP   1 patch





  DAILY JOHN   Administration


 


Lisinopril  10 mg  11/10/17 11:00  11/12/17 10:31





  Prinivil  PO   10 mg





  DAILY JOHN   Administration


 


Magnesium Oxide  400 mg  11/07/17 22:00  11/12/17 10:30





  Mag-Ox -  PO   400 mg





  BID JOHN   Administration


 


Methadone HCl 80 mg/ Methadone  90 mg  11/08/17 12:30  11/12/17 06:34





  HCl 10 mg  PO   90 mg





  DAILY@0600 JOHN   Administration


 


Methocarbamol  750 mg  11/07/17 22:00  11/12/17 10:31





  Robaxin -  PO   750 mg





  BID JOHN   Administration


 


Oxybutynin Chloride  5 mg  11/08/17 10:00  11/12/17 10:30





  Ditropan -  PO   5 mg





  DAILY JOHN   Administration


 


Oxycodone HCl  10 mg  11/08/17 11:26  11/12/17 10:37





  Roxicodone -  PO   10 mg





  Q6H PRN   Administration





  PAIN   


 


Potassium Phos/Sodium Phos  1 packet  11/07/17 22:00  11/12/17 10:30





  Phos-Nak Packet -  PO   1 packet





  BID JOHN   Administration


 


Pregabalin  200 mg  11/07/17 22:00  11/12/17 06:34





  Lyrica -  PO   200 mg





  TID JOHN   Administration


 


Zolpidem Tartrate  5 mg  11/08/17 02:31  11/11/17 22:29





  Ambien -  PO   5 mg





  HS PRN   Administration





  INSOMNIA   








 CBC, BMP





 11/11/17 06:00 





 11/11/17 06:00 








Impression


1. ROJELIO


2. hx CKD


3. DM


4. cellulitis 


5. narcotic dependence


6. insomnia


7. hyperlipidemia


8. urinary retention s/p suprapubic cath


9. hx anemia


10 Hep C


11. nephrolithiasis


12. right side hydro





Plan


-continue hydration.  Monitor renal function


- likely etiology of rojelio is pre-renal disease


- urology follow up


- 





MV

## 2017-11-12 NOTE — PN
Progress Note, Physician


Chief Complaint: 





UTI


History of Present Illness: 





NAD


sitting at the edge of the bed


ambulatory


was refusing imaging and IV





- Current Medication List


Current Medications: 


Active Medications





Acetaminophen (Tylenol -)  650 mg PO Q6H PRN


   PRN Reason: PAIN


   Last Admin: 11/12/17 16:14 Dose:  650 mg


Amlodipine Besylate (Norvasc -)  10 mg PO DAILY [facility]


   Last Admin: 11/12/17 10:30 Dose:  10 mg


Atorvastatin Calcium (Lipitor -)  10 mg PO HS [facility]


   Last Admin: 11/12/17 21:37 Dose:  10 mg


Docusate Sodium (Colace -)  300 mg PO HS [facility]


   Last Admin: 11/12/17 21:36 Dose:  300 mg


Heparin Sodium (Porcine) (Heparin -)  5,000 unit SQ BID [facility]


   Last Admin: 11/12/17 21:40 Dose:  5,000 unit


Sodium Chloride (Normal Saline -)  1,000 mls @ 50 mls/hr IV ASDIR [facility]


   Stop: 11/13/17 15:58


   Last Admin: 11/12/17 16:14 Dose:  50 mls/hr


Insulin Aspart (Novolog Vial Sliding Scale -)  1 vial SQ ACHS [facility]


   PRN Reason: Protocol


   Last Admin: 11/12/17 21:40 Dose:  Not Given


Insulin Detemir (Levemir Vial)  20 units SQ HS [facility]


   Last Admin: 11/11/17 22:29 Dose:  20 unit


Lidocaine (Lidoderm Patch -)  1 patch TP DAILY [facility]


   Last Admin: 11/12/17 10:29 Dose:  1 patch


Lisinopril (Prinivil)  10 mg PO DAILY [facility]


   Last Admin: 11/12/17 10:31 Dose:  10 mg


Magnesium Oxide (Mag-Ox -)  400 mg PO BID [facility]


   Last Admin: 11/12/17 21:38 Dose:  400 mg


Methadone HCl 80 mg/ Methadone (HCl 10 mg)  90 mg PO DAILY@0600 [facility]


   Last Admin: 11/12/17 06:34 Dose:  90 mg


Methocarbamol (Robaxin -)  750 mg PO BID [facility]


   Last Admin: 11/12/17 21:38 Dose:  750 mg


Oxybutynin Chloride (Ditropan -)  5 mg PO DAILY [facility]


   Last Admin: 11/12/17 10:30 Dose:  5 mg


Oxycodone HCl (Roxicodone -)  10 mg PO Q6H PRN


   PRN Reason: PAIN


   Last Admin: 11/12/17 22:18 Dose:  10 mg


Potassium Phos/Sodium Phos (Phos-Nak Packet -)  1 packet PO BID [facility]


   Last Admin: 11/12/17 21:38 Dose:  1 packet


Pregabalin (Lyrica -)  200 mg PO TID JOHN


   Last Admin: 11/12/17 21:37 Dose:  200 mg


Zolpidem Tartrate (Ambien -)  5 mg PO HS PRN


   PRN Reason: INSOMNIA


   Last Admin: 11/12/17 21:42 Dose:  5 mg











- Objective


Vital Signs: 


 Vital Signs











Temperature  97.8 F   11/12/17 17:18


 


Pulse Rate  69   11/12/17 17:18


 


Respiratory Rate  20   11/12/17 17:18


 


Blood Pressure  167/86   11/12/17 17:18


 


O2 Sat by Pulse Oximetry (%)  97   11/12/17 09:00











Constitutional: Yes: Well Nourished, No Distress, Calm


Cardiovascular: Yes: Regular Rate and Rhythm


Respiratory: Yes: Regular


Edema: No


Peripheral Pulses WNL: Yes


Neurological: Yes: Alert, Oriented


Psychiatric: Yes: Alert, Oriented


Labs: 


 CBC, BMP





 11/11/17 06:00 





 11/11/17 06:00 











Problem List





- Problems


(1) Lesion of pelvic bone


Assessment/Plan: 


-CT reports lesion of pelvic bones compatible with destruction and metastatic 

disease. 


-bone scan


-oncology on board


Code(s): M89.9 - DISORDER OF BONE, UNSPECIFIED   





(2) Chronic kidney disease


Assessment/Plan: 


-renal consult appreciated


-restart IV hydration


-labs in AM


Code(s): N18.9 - CHRONIC KIDNEY DISEASE, UNSPECIFIED   





(3) MRSA (methicillin resistant Staphylococcus aureus)


Code(s): A49.02 - METHICILLIN RESIS STAPH INFECTION, UNSP SITE   





Assessment/Plan





see problem list

## 2017-11-13 LAB
ALBUMIN SERPL-MCNC: 3.4 G/DL (ref 3.4–5)
ALP SERPL-CCNC: 115 U/L (ref 45–117)
ALT SERPL-CCNC: 13 U/L (ref 12–78)
ANION GAP SERPL CALC-SCNC: 5 MMOL/L (ref 8–16)
AST SERPL-CCNC: 13 U/L (ref 15–37)
BASOPHILS # BLD: 1.1 % (ref 0–2)
BILIRUB SERPL-MCNC: 0.5 MG/DL (ref 0.2–1)
CALCIUM SERPL-MCNC: 8.8 MG/DL (ref 8.5–10.1)
CO2 SERPL-SCNC: 28 MMOL/L (ref 21–32)
CREAT SERPL-MCNC: 1.8 MG/DL (ref 0.7–1.3)
DEPRECATED RDW RBC AUTO: 14.3 % (ref 11.9–15.9)
EOSINOPHIL # BLD: 1.5 % (ref 0–4.5)
GLUCOSE SERPL-MCNC: 136 MG/DL (ref 74–106)
MCH RBC QN AUTO: 28.7 PG (ref 25.7–33.7)
MCHC RBC AUTO-ENTMCNC: 32.8 G/DL (ref 32–35.9)
MCV RBC: 87.5 FL (ref 80–96)
NEUTROPHILS # BLD: 69.3 % (ref 42.8–82.8)
PLATELET # BLD AUTO: 156 K/MM3 (ref 134–434)
PMV BLD: 8.3 FL (ref 7.5–11.1)
PROT SERPL-MCNC: 7.7 G/DL (ref 6.4–8.2)
WBC # BLD AUTO: 8 K/MM3 (ref 4–10)

## 2017-11-13 RX ADMIN — INSULIN ASPART SCH UNIT: 100 INJECTION, SOLUTION INTRAVENOUS; SUBCUTANEOUS at 12:07

## 2017-11-13 RX ADMIN — PREGABALIN SCH MG: 50 CAPSULE ORAL at 06:29

## 2017-11-13 RX ADMIN — INSULIN DETEMIR SCH UNIT: 100 INJECTION, SOLUTION SUBCUTANEOUS at 21:23

## 2017-11-13 RX ADMIN — DOCUSATE SODIUM SCH MG: 100 CAPSULE, LIQUID FILLED ORAL at 21:13

## 2017-11-13 RX ADMIN — LISINOPRIL SCH MG: 10 TABLET ORAL at 10:37

## 2017-11-13 RX ADMIN — POTASSIUM & SODIUM PHOSPHATES POWDER PACK 280-160-250 MG SCH PACKET: 280-160-250 PACK at 21:14

## 2017-11-13 RX ADMIN — MAGNESIUM OXIDE TAB 400 MG (241.3 MG ELEMENTAL MG) SCH MG: 400 (241.3 MG) TAB at 10:36

## 2017-11-13 RX ADMIN — LIDOCAINE SCH PATCH: 50 PATCH TOPICAL at 10:39

## 2017-11-13 RX ADMIN — METHOCARBAMOL SCH MG: 500 TABLET ORAL at 10:37

## 2017-11-13 RX ADMIN — ATORVASTATIN CALCIUM SCH MG: 10 TABLET, FILM COATED ORAL at 21:13

## 2017-11-13 RX ADMIN — POTASSIUM & SODIUM PHOSPHATES POWDER PACK 280-160-250 MG SCH PACKET: 280-160-250 PACK at 10:36

## 2017-11-13 RX ADMIN — ACETAMINOPHEN PRN MG: 325 TABLET ORAL at 11:01

## 2017-11-13 RX ADMIN — INSULIN ASPART SCH: 100 INJECTION, SOLUTION INTRAVENOUS; SUBCUTANEOUS at 16:46

## 2017-11-13 RX ADMIN — PREGABALIN SCH MG: 50 CAPSULE ORAL at 14:08

## 2017-11-13 RX ADMIN — INSULIN ASPART SCH: 100 INJECTION, SOLUTION INTRAVENOUS; SUBCUTANEOUS at 06:30

## 2017-11-13 RX ADMIN — METHOCARBAMOL SCH MG: 500 TABLET ORAL at 21:14

## 2017-11-13 RX ADMIN — INSULIN ASPART SCH: 100 INJECTION, SOLUTION INTRAVENOUS; SUBCUTANEOUS at 21:23

## 2017-11-13 RX ADMIN — METHADONE HYDROCHLORIDE SCH MG: 40 TABLET ORAL at 06:29

## 2017-11-13 RX ADMIN — PREGABALIN SCH MG: 50 CAPSULE ORAL at 21:11

## 2017-11-13 RX ADMIN — HEPARIN SODIUM SCH UNIT: 5000 INJECTION, SOLUTION INTRAVENOUS; SUBCUTANEOUS at 10:38

## 2017-11-13 RX ADMIN — ZOLPIDEM TARTRATE PRN MG: 5 TABLET, COATED ORAL at 21:50

## 2017-11-13 RX ADMIN — OXYBUTYNIN CHLORIDE SCH MG: 5 TABLET ORAL at 10:35

## 2017-11-13 RX ADMIN — ACETAMINOPHEN PRN MG: 325 TABLET ORAL at 21:31

## 2017-11-13 RX ADMIN — HEPARIN SODIUM SCH UNIT: 5000 INJECTION, SOLUTION INTRAVENOUS; SUBCUTANEOUS at 21:10

## 2017-11-13 RX ADMIN — AMLODIPINE BESYLATE SCH MG: 10 TABLET ORAL at 10:36

## 2017-11-13 RX ADMIN — MAGNESIUM OXIDE TAB 400 MG (241.3 MG ELEMENTAL MG) SCH MG: 400 (241.3 MG) TAB at 21:11

## 2017-11-13 NOTE — PN
Progress Note, Physician


History of Present Illness: 





Pt seen and examined at bedside. He is awake and alert. He denies shortness of 

breath. 





- Current Medication List


Current Medications: 


Active Medications





Acetaminophen (Tylenol -)  650 mg PO Q6H PRN


   PRN Reason: PAIN


   Last Admin: 11/13/17 11:01 Dose:  650 mg


Amlodipine Besylate (Norvasc -)  10 mg PO DAILY [facility]


   Last Admin: 11/13/17 10:36 Dose:  10 mg


Atorvastatin Calcium (Lipitor -)  10 mg PO HS [facility]


   Last Admin: 11/12/17 21:37 Dose:  10 mg


Docusate Sodium (Colace -)  300 mg PO HS [facility]


   Last Admin: 11/12/17 21:36 Dose:  300 mg


Heparin Sodium (Porcine) (Heparin -)  5,000 unit SQ BID [facility]


   Last Admin: 11/13/17 10:38 Dose:  5,000 unit


Insulin Aspart (Novolog Vial Sliding Scale -)  1 vial SQ ACHS [facility]


   PRN Reason: Protocol


   Last Admin: 11/13/17 12:07 Dose:  2 unit


Insulin Detemir (Levemir Vial)  20 units SQ HS [facility]


   Last Admin: 11/12/17 22:00 Dose:  Not Given


Lidocaine (Lidoderm Patch -)  1 patch TP DAILY [facility]


   Last Admin: 11/13/17 10:39 Dose:  1 patch


Lisinopril (Prinivil)  10 mg PO DAILY [facility]


   Last Admin: 11/13/17 10:37 Dose:  10 mg


Magnesium Oxide (Mag-Ox -)  400 mg PO BID [facility]


   Last Admin: 11/13/17 10:36 Dose:  400 mg


Methadone HCl 80 mg/ Methadone (HCl 10 mg)  90 mg PO DAILY@0600 [facility]


   Last Admin: 11/13/17 06:29 Dose:  90 mg


Methocarbamol (Robaxin -)  750 mg PO BID [facility]


   Last Admin: 11/13/17 10:37 Dose:  750 mg


Oxybutynin Chloride (Ditropan -)  5 mg PO DAILY [facility]


   Last Admin: 11/13/17 10:35 Dose:  5 mg


Oxycodone HCl (Roxicodone -)  10 mg PO Q6H PRN


   PRN Reason: PAIN


   Last Admin: 11/13/17 11:00 Dose:  10 mg


Potassium Phos/Sodium Phos (Phos-Nak Packet -)  1 packet PO BID [facility]


   Last Admin: 11/13/17 10:36 Dose:  1 packet


Pregabalin (Lyrica -)  200 mg PO TID [facility]


   Last Admin: 11/13/17 14:08 Dose:  200 mg


Zolpidem Tartrate (Ambien -)  5 mg PO HS PRN


   PRN Reason: INSOMNIA


   Last Admin: 11/12/17 21:42 Dose:  5 mg











- Objective


Vital Signs: 


 Vital Signs











Temperature  99.2 F   11/13/17 15:05


 


Pulse Rate  84   11/13/17 15:05


 


Respiratory Rate  18   11/13/17 15:05


 


Blood Pressure  157/91   11/13/17 09:40


 


O2 Sat by Pulse Oximetry (%)  97   11/12/17 09:00











Constitutional: Yes: Calm


Eyes: Yes: Conjunctiva Clear


HENT: Yes: Atraumatic


Neck: Yes: Supple


Cardiovascular: Yes: S1, S2


Respiratory: Yes: CTA Bilaterally


Gastrointestinal: Yes: Soft


Genitourinary: Yes: Other (suprapubic cath)


Edema: No


Neurological: Yes: Oriented


Psychiatric: Yes: Oriented


Labs: 


 CBC, BMP





 11/13/17 06:35 





 11/13/17 06:35 











Problem List





- Problems


(1) Cellulitis


Code(s): L03.90 - CELLULITIS, UNSPECIFIED   





(2) MRSA (methicillin resistant Staphylococcus aureus)


Code(s): A49.02 - METHICILLIN RESIS STAPH INFECTION, UNSP SITE   





(3) Acute kidney failure


Code(s): N17.9 - ACUTE KIDNEY FAILURE, UNSPECIFIED   


Qualifiers: 


   Acute renal failure type: unspecified   Qualified Code(s): N17.9 - Acute 

kidney failure, unspecified   





Assessment/Plan


 Current Medications











Generic Name Dose Route Start Last Admin





  Trade Name Freq  PRN Reason Stop Dose Admin


 


Acetaminophen  650 mg  11/08/17 11:26  11/13/17 11:01





  Tylenol -  PO   650 mg





  Q6H PRN   Administration





  PAIN   


 


Amlodipine Besylate  10 mg  11/08/17 10:00  11/13/17 10:36





  Norvasc -  PO   10 mg





  DAILY JOHN   Administration


 


Atorvastatin Calcium  10 mg  11/07/17 22:00  11/12/17 21:37





  Lipitor -  PO   10 mg





  HS JOHN   Administration


 


Docusate Sodium  300 mg  11/07/17 22:00  11/12/17 21:36





  Colace -  PO   300 mg





  HS JOHN   Administration


 


Heparin Sodium (Porcine)  5,000 unit  11/07/17 22:00  11/13/17 10:38





  Heparin -  SQ   5,000 unit





  BID JOHN   Administration


 


Insulin Aspart  1 vial  11/07/17 22:00  11/13/17 12:07





  Novolog Vial Sliding Scale -  SQ   2 unit





  [facility]   Administration





  Protocol   


 


Insulin Detemir  20 units  11/10/17 22:00  11/12/17 22:00





  Levemir Vial  SQ   Not Given





  HS JOHN   


 


Lidocaine  1 patch  11/08/17 10:00  11/13/17 10:39





  Lidoderm Patch -  TP   1 patch





  DAILY JOHN   Administration


 


Lisinopril  10 mg  11/10/17 11:00  11/13/17 10:37





  Prinivil  PO   10 mg





  DAILY JOHN   Administration


 


Magnesium Oxide  400 mg  11/07/17 22:00  11/13/17 10:36





  Mag-Ox -  PO   400 mg





  BID JOHN   Administration


 


Methadone HCl 80 mg/ Methadone  90 mg  11/08/17 12:30  11/13/17 06:29





  HCl 10 mg  PO   90 mg





  DAILY@0600 JOHN   Administration


 


Methocarbamol  750 mg  11/07/17 22:00  11/13/17 10:37





  Robaxin -  PO   750 mg





  BID JOHN   Administration


 


Oxybutynin Chloride  5 mg  11/08/17 10:00  11/13/17 10:35





  Ditropan -  PO   5 mg





  DAILY JOHN   Administration


 


Oxycodone HCl  10 mg  11/08/17 11:26  11/13/17 11:00





  Roxicodone -  PO   10 mg





  Q6H PRN   Administration





  PAIN   


 


Potassium Phos/Sodium Phos  1 packet  11/07/17 22:00  11/13/17 10:36





  Phos-Nak Packet -  PO   1 packet





  BID JOHN   Administration


 


Pregabalin  200 mg  11/07/17 22:00  11/13/17 14:08





  Lyrica -  PO   200 mg





  TID JOHN   Administration


 


Zolpidem Tartrate  5 mg  11/08/17 02:31  11/12/17 21:42





  Ambien -  PO   5 mg





  HS PRN   Administration





  INSOMNIA   











Impression


1. ROJELIO


2. hx CKD


3. DM


4. cellulitis 


5. narcotic dependence


6. insomnia


7. hyperlipidemia


8. urinary retention s/p suprapubic cath


9. hx anemia


10 Hep C


11. nephrolithiasis


12. right side hydro





Plan


- urology input appreciated


- restart fluids


- crt is worse today


- cont abx per ID


- will fllow


- likely etiology of rojelio is pre-renal disease








Dr Arango

## 2017-11-13 NOTE — PN
Progress Note (short form)





- Note


Progress Note: 





events noted onc to see pt


mrsa on abx


no gu intervention at this time

## 2017-11-13 NOTE — PN
Progress Note, Physician


Chief Complaint: 





AWAKE ALERT


COMPLETED BONE SCAN








- Current Medication List


Current Medications: 


Active Medications





Acetaminophen (Tylenol -)  650 mg PO Q6H PRN


   PRN Reason: PAIN


   Last Admin: 11/13/17 11:01 Dose:  650 mg


Amlodipine Besylate (Norvasc -)  10 mg PO DAILY [facility]


   Last Admin: 11/13/17 10:36 Dose:  10 mg


Atorvastatin Calcium (Lipitor -)  10 mg PO HS [facility]


   Last Admin: 11/12/17 21:37 Dose:  10 mg


Docusate Sodium (Colace -)  300 mg PO HS [facility]


   Last Admin: 11/12/17 21:36 Dose:  300 mg


Heparin Sodium (Porcine) (Heparin -)  5,000 unit SQ BID [facility]


   Last Admin: 11/13/17 10:38 Dose:  5,000 unit


Sodium Chloride (1/2 Normal Saline)  1,000 mls @ 50 mls/hr IV ASDIR [facility]


   Stop: 11/14/17 16:18


Insulin Aspart (Novolog Vial Sliding Scale -)  1 vial SQ ACHS [facility]


   PRN Reason: Protocol


   Last Admin: 11/13/17 12:07 Dose:  2 unit


Insulin Detemir (Levemir Vial)  20 units SQ HS [facility]


   Last Admin: 11/12/17 22:00 Dose:  Not Given


Lidocaine (Lidoderm Patch -)  1 patch TP DAILY [facility]


   Last Admin: 11/13/17 10:39 Dose:  1 patch


Lisinopril (Prinivil)  10 mg PO DAILY [facility]


   Last Admin: 11/13/17 10:37 Dose:  10 mg


Magnesium Oxide (Mag-Ox -)  400 mg PO BID [facility]


   Last Admin: 11/13/17 10:36 Dose:  400 mg


Methadone HCl 80 mg/ Methadone (HCl 10 mg)  90 mg PO DAILY@0600 [facility]


   Last Admin: 11/13/17 06:29 Dose:  90 mg


Methocarbamol (Robaxin -)  750 mg PO BID [facility]


   Last Admin: 11/13/17 10:37 Dose:  750 mg


Oxybutynin Chloride (Ditropan -)  5 mg PO DAILY [facility]


   Last Admin: 11/13/17 10:35 Dose:  5 mg


Oxycodone HCl (Roxicodone -)  10 mg PO Q6H PRN


   PRN Reason: PAIN


   Last Admin: 11/13/17 11:00 Dose:  10 mg


Potassium Phos/Sodium Phos (Phos-Nak Packet -)  1 packet PO BID [facility]


   Last Admin: 11/13/17 10:36 Dose:  1 packet


Pregabalin (Lyrica -)  200 mg PO TID [facility]


   Last Admin: 11/13/17 14:08 Dose:  200 mg


Zolpidem Tartrate (Ambien -)  5 mg PO HS PRN


   PRN Reason: INSOMNIA


   Last Admin: 11/12/17 21:42 Dose:  5 mg











- Objective


Vital Signs: 


 Vital Signs











Temperature  99.2 F   11/13/17 15:05


 


Pulse Rate  84   11/13/17 15:05


 


Respiratory Rate  18   11/13/17 15:05


 


Blood Pressure  157/91   11/13/17 09:40


 


O2 Sat by Pulse Oximetry (%)  97   11/12/17 09:00











Constitutional: Yes: No Distress


Eyes: Yes: WNL


HENT: Yes: WNL


Neck: Yes: WNL


Cardiovascular: Yes: WNL


Respiratory: Yes: WNL


Gastrointestinal: Yes: WNL


Genitourinary: Yes: Anglin Present, Other


Musculoskeletal: Yes: Back Pain, Muscle Weakness


Edema: No


Peripheral Pulses WNL: Yes


Integumentary: Yes: WNL


Wound/Incision: Yes: Clean/Dry


Neurological: Yes: WNL


...Motor Strength: LLE, RLE


Psychiatric: Yes: Agitated


Labs: 


 CBC, BMP





 11/13/17 06:35 





 11/13/17 06:35 











Problem List





- Problems


(1) MRSA (methicillin resistant Staphylococcus aureus)


Code(s): A49.02 - METHICILLIN RESIS STAPH INFECTION, UNSP SITE   





(2) UTI (urinary tract infection)


Code(s): N39.0 - URINARY TRACT INFECTION, SITE NOT SPECIFIED   


Qualifiers: 


   Urinary tract infection type: site unspecified   Hematuria presence: without 

hematuria   Qualified Code(s): N39.0 - Urinary tract infection, site not 

specified   





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


Code(s): E11.59 - TYPE 2 DIABETES MELLITUS WITH OTH CIRCULATORY COMPLICATIONS; 

I70.209 - UNSP ATHSCL NATIVE ARTERIES OF EXTREMITIES, UNSP EXTREMITY   





(4) HTN (hypertension)


Code(s): I10 - ESSENTIAL (PRIMARY) HYPERTENSION   





(5) Hepatitis C infection


Code(s): B19.20 - UNSPECIFIED VIRAL HEPATITIS C WITHOUT HEPATIC COMA   


Qualifiers: 


   Viral hepatitis chronicity: chronic 








(7) Myelopathy of thoracic region


Code(s): M47.14 - OTHER SPONDYLOSIS WITH MYELOPATHY, THORACIC REGION   








(9) Lesion of pelvic bone


Code(s): M89.9 - DISORDER OF BONE, UNSPECIFIED   





Assessment/Plan





BONE SCAN WORKUP FOR ONCOLOGY


PELVIC LESION?


 FOLLOW UP APPRECIATED


PAIN CONTROL


ON METHADONE FOR PREVIOUS HISTORY


OF SUBSTANCE ABUSE

## 2017-11-14 VITALS — HEART RATE: 77 BPM | DIASTOLIC BLOOD PRESSURE: 84 MMHG | TEMPERATURE: 98.4 F | SYSTOLIC BLOOD PRESSURE: 132 MMHG

## 2017-11-14 LAB
ANION GAP SERPL CALC-SCNC: 9 MMOL/L (ref 8–16)
APTT BLD: 36.5 SECONDS (ref 26.9–34.4)
CALCIUM SERPL-MCNC: 8.7 MG/DL (ref 8.5–10.1)
CO2 SERPL-SCNC: 23 MMOL/L (ref 21–32)
CREAT SERPL-MCNC: 1.6 MG/DL (ref 0.7–1.3)
GLUCOSE SERPL-MCNC: 96 MG/DL (ref 74–106)
INR BLD: 0.89 (ref 0.82–1.09)
PT PNL PPP: 10.1 SEC (ref 9.98–11.88)

## 2017-11-14 RX ADMIN — METHADONE HYDROCHLORIDE SCH MG: 40 TABLET ORAL at 06:46

## 2017-11-14 RX ADMIN — HEPARIN SODIUM SCH UNIT: 5000 INJECTION, SOLUTION INTRAVENOUS; SUBCUTANEOUS at 11:03

## 2017-11-14 RX ADMIN — PREGABALIN SCH MG: 50 CAPSULE ORAL at 06:46

## 2017-11-14 RX ADMIN — MAGNESIUM OXIDE TAB 400 MG (241.3 MG ELEMENTAL MG) SCH MG: 400 (241.3 MG) TAB at 11:01

## 2017-11-14 RX ADMIN — LISINOPRIL SCH MG: 10 TABLET ORAL at 11:01

## 2017-11-14 RX ADMIN — OXYBUTYNIN CHLORIDE SCH MG: 5 TABLET ORAL at 11:01

## 2017-11-14 RX ADMIN — AMLODIPINE BESYLATE SCH MG: 10 TABLET ORAL at 11:00

## 2017-11-14 RX ADMIN — INSULIN ASPART SCH UNIT: 100 INJECTION, SOLUTION INTRAVENOUS; SUBCUTANEOUS at 12:10

## 2017-11-14 RX ADMIN — LIDOCAINE SCH PATCH: 50 PATCH TOPICAL at 10:59

## 2017-11-14 RX ADMIN — ACETAMINOPHEN PRN MG: 325 TABLET ORAL at 11:03

## 2017-11-14 RX ADMIN — INSULIN ASPART SCH: 100 INJECTION, SOLUTION INTRAVENOUS; SUBCUTANEOUS at 06:34

## 2017-11-14 RX ADMIN — PREGABALIN SCH: 50 CAPSULE ORAL at 15:07

## 2017-11-14 RX ADMIN — POTASSIUM & SODIUM PHOSPHATES POWDER PACK 280-160-250 MG SCH PACKET: 280-160-250 PACK at 11:01

## 2017-11-14 RX ADMIN — METHOCARBAMOL SCH MG: 500 TABLET ORAL at 11:02

## 2017-11-14 NOTE — PN
Progress Note (short form)





- Note


Progress Note: 





 Last Vital Signs


pt seen and examined. 





Bone scan results reviewed. 





I have explained him the details of the findings with him. He asked me to 

discuss it with his wife and daughter. 





Daughter was at bedside later and I did speak to her. 





O/E:


General: in no acute distress


CTA b/l


Abdomen: benign


No LE edema








Temp Pulse Resp BP Pulse Ox


 


 98.4 F   77   16   132/84   97 


 


 11/14/17 10:00  11/14/17 10:00  11/14/17 10:00  11/14/17 10:00  11/14/17 09:00








 CBC, BMP





 11/13/17 06:35 





 11/14/17 07:00 





 Current Medications











Generic Name Dose Route Start Last Admin





  Trade Name Freq  PRN Reason Stop Dose Admin


 


Acetaminophen  650 mg  11/08/17 11:26  11/14/17 11:03





  Tylenol -  PO   650 mg





  Q6H PRN   Administration





  PAIN   


 


Amlodipine Besylate  10 mg  11/08/17 10:00  11/14/17 11:00





  Norvasc -  PO   10 mg





  DAILY JOHN   Administration


 


Atorvastatin Calcium  10 mg  11/07/17 22:00  11/13/17 21:13





  Lipitor -  PO   10 mg





  HS JOHN   Administration


 


Docusate Sodium  300 mg  11/07/17 22:00  11/13/17 21:13





  Colace -  PO   300 mg





  HS JOHN   Administration


 


Heparin Sodium (Porcine)  5,000 unit  11/07/17 22:00  11/14/17 11:03





  Heparin -  SQ   5,000 unit





  BID JOHN   Administration


 


Sodium Chloride  1,000 mls @ 50 mls/hr  11/13/17 16:30  11/13/17 16:46





  1/2 Normal Saline  IV  11/14/17 16:18  50 mls/hr





  ASDIR JOHN   Administration


 


Insulin Aspart  1 vial  11/07/17 22:00  11/14/17 12:10





  Novolog Vial Sliding Scale -  SQ   2 unit





  ACHS JOHN   Administration





  Protocol   


 


Insulin Detemir  20 units  11/10/17 22:00  11/13/17 21:23





  Levemir Vial  SQ   20 unit





  HS JOHN   Administration


 


Lidocaine  1 patch  11/08/17 10:00  11/14/17 10:59





  Lidoderm Patch -  TP   1 patch





  DAILY JOHN   Administration


 


Lisinopril  10 mg  11/10/17 11:00  11/14/17 11:01





  Prinivil  PO   10 mg





  DAILY JOHN   Administration


 


Magnesium Oxide  400 mg  11/07/17 22:00  11/14/17 11:01





  Mag-Ox -  PO   400 mg





  BID JOHN   Administration


 


Methadone HCl 80 mg/ Methadone  90 mg  11/08/17 12:30  11/14/17 06:46





  HCl 10 mg  PO   90 mg





  DAILY@0600 JOHN   Administration


 


Methocarbamol  750 mg  11/07/17 22:00  11/14/17 11:02





  Robaxin -  PO   750 mg





  BID JOHN   Administration


 


Oxybutynin Chloride  5 mg  11/08/17 10:00  11/14/17 11:01





  Ditropan -  PO   5 mg





  DAILY JOHN   Administration


 


Oxycodone HCl  10 mg  11/08/17 11:26  11/14/17 10:59





  Roxicodone -  PO   10 mg





  Q6H PRN   Administration





  PAIN   


 


Potassium Phos/Sodium Phos  1 packet  11/07/17 22:00  11/14/17 11:01





  Phos-Nak Packet -  PO   1 packet





  BID JOHN   Administration


 


Pregabalin  200 mg  11/07/17 22:00  11/14/17 06:46





  Lyrica -  PO   200 mg





  TID JOHN   Administration


 


Zolpidem Tartrate  5 mg  11/08/17 02:31  11/13/17 21:50





  Ambien -  PO   5 mg





  HS PRN   Administration





  INSOMNIA   











NEW OSTEOBLASTIC METASTASES. 


UNKNOWN PRIMARY





-for OP w/u, he is being discharged today





-all the paper work done for a bone biopsy, spoke to Nathalie of IR at [facility]

, an order script was faxed and she will let me know the date. 





-Also has appointment with us/me at [facility]'s first floor office on 11/21 

1130am. 





-Also put in for CT c/a/p without contrast due to CKD, will be scheduled at 

[facility] most likely next week, will keep him posted.





-OP f/u needs to be done for pain management and also /PMD





-spoke to the daughter in detail (Vidhya, Phone number ). She 

verbalized understanding. 





-updated RN on the floor.

## 2017-11-14 NOTE — DS
Physical Examination


Vital Signs: 


 Vital Signs











Temperature  98 F   11/14/17 06:00


 


Pulse Rate  72   11/14/17 06:00


 


Respiratory Rate  20   11/14/17 06:00


 


Blood Pressure  158/96   11/14/17 06:00


 


O2 Sat by Pulse Oximetry (%)  97   11/13/17 21:00











Findings/Remarks: 





PATIENT AWAKE ALERT


WANTS TO GO HOME


AWARE OF BONE SCAN RESULTS AND WILL


CONTINUE WITH ONCOLOGY DR ROCK


AS OUTPATIENT AND  DR HELDER SCHAFFER





Constitutional: Yes: Mild Distress


Eyes: Yes: WNL


HENT: Yes: WNL


Neck: Yes: WNL


Cardiovascular: Yes: WNL


Respiratory: Yes: WNL


Gastrointestinal: Yes: WNL


Renal/: Yes: WNL


Musculoskeletal: Yes: Back Pain


Extremities: Yes: WNL


Edema: No


Peripheral Pulses WNL: Yes


Integumentary: Yes: WNL


Wound/Incision: Yes: Clean/Dry


Neurological: Yes: Pre-Existing Deficit


...Motor Strength: LLE, RLE


Psychiatric: Yes: Agitated


Labs: 


 CBC, BMP





 11/13/17 06:35 





 11/14/17 07:00 











Discharge Summary


Reason For Visit: UTI; METHICILLIN RESIST STAPHYLOCOCCUS


Current Active Problems





Cellulitis (Acute)


Lesion of pelvic bone (Acute)


MRSA (methicillin resistant Staphylococcus aureus) (Acute)


UTI (urinary tract infection) (Acute)








Procedures: Principal: BONE SCAN


Other Procedures: LABS/CX


Hospital Course: 





PATIENT ADMITTED BY  FOR RESISTANT URINE INFECTION/PROSTATITIS


FOUND TO HAVE ON CT SCAN AND BONE SCAN POSITIVE FINDINGS OF 


PELVIC LESION. PATIENT WILL NEED OUTPATIENT F/U WITH ONCOLOGY


AND BONE BIOPSY. NO NEED FOR ANTIBIOTICS AT THIS TIME.


Condition: Stable





- Instructions


Diet, Activity, Other Instructions: 


LOW SODIUM


LOW CONCENTRATED SWEETS/SUGARS DIET


DR ROCK FOR ONCOLOGY, SCHEDULED WITH ONCOLOGY


DR BAINS/KARIME HIS PRIMARY TEAM OF PHYSICIANS IN [facility]


WE WILL SEND RESULTS TO DR SCHAFFER OFFICE.


Disposition: VNS/HOME HEALTH CARE





- Home Medications


Comprehensive Discharge Medication List: 


Ambulatory Orders





Lidocaine 5% Patch [Lidoderm -] 1 patch TP DAILY #14 patch 01/06/14 


Pregabalin [Lyrica -] 200 mg PO TID 12/04/14 


Amlodipine Besylate [Norvasc -] 10 mg PO DAILY 10/30/15 


Acetaminophen [Tylenol .Regular Strength -] 650 mg PO Q6H PRN #0 tablet 02/11/ 16 


Atorvastatin Ca [Lipitor] 10 mg PO HS #0 tablet 02/11/16 


Docusate Sodium [Colace -] 300 mg PO HS #0 capsule 02/11/16 


Magnesium Oxide [Mag-Ox -] 400 mg PO BID #0 tablet 02/11/16 


Methadone [Dolophine -] 90 mg PO DAILY@0600 #0 tablet 02/11/16 


Multivitamins [Multivit ([facility] Formulary)] 1 tab PO DAILY #0 tab 02/11/16 


Na Biphos/K Phos (Neutra-Phos) [Neutra-Phos Liquid -] 1 pkt PO BID #0 packet 02/ 11/16 


Insulin Glargine,Hum.rec.anlog [Lantus Solostar PEN (NF)] 35 units SQ HS 05/04/ 16 


Oxybutynin Chloride [Oxybutynin Chloride ER] 5 mg PO DAILY 05/04/16 


Methocarbamol [Robaxin -] 750 mg PO BID 05/31/16 


Cephalexin Monohydrate [Keflex -] 500 mg PO Q8H #21 capsule 11/07/17

## 2017-11-24 NOTE — PN
Progress Note (short form)





- Note


Progress Note: 





Addendum Diagnosis:


Severe malnutrition and presure ulcer buttocks and coccyx.





Problem List





- Problems


(1) MRSA (methicillin resistant Staphylococcus aureus)


Code(s): A49.02 - METHICILLIN RESIS STAPH INFECTION, UNSP SITE   





(2) UTI (urinary tract infection)


Code(s): N39.0 - URINARY TRACT INFECTION, SITE NOT SPECIFIED   


Qualifiers: 


   Urinary tract infection type: site unspecified   Hematuria presence: without 

hematuria   Qualified Code(s): N39.0 - Urinary tract infection, site not 

specified   





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


Code(s): E11.59 - TYPE 2 DIABETES MELLITUS WITH OTH CIRCULATORY COMPLICATIONS; 

I70.209 - UNSP ATHSCL NATIVE ARTERIES OF EXTREMITIES, UNSP EXTREMITY   





(4) HTN (hypertension)


Code(s): I10 - ESSENTIAL (PRIMARY) HYPERTENSION   





(5) Hepatitis C infection


Code(s): B19.20 - UNSPECIFIED VIRAL HEPATITIS C WITHOUT HEPATIC COMA   


Qualifiers: 


   Viral hepatitis chronicity: chronic 








(7) Myelopathy of thoracic region


Code(s): M47.14 - OTHER SPONDYLOSIS WITH MYELOPATHY, THORACIC REGION   








(9) Lesion of pelvic bone


Code(s): M89.9 - DISORDER OF BONE, UNSPECIFIED

## 2017-12-11 ENCOUNTER — HOSPITAL ENCOUNTER (OUTPATIENT)
Dept: HOSPITAL 74 - JRADIR | Age: 62
Discharge: HOME | End: 2017-12-11
Attending: INTERNAL MEDICINE
Payer: COMMERCIAL

## 2017-12-11 VITALS — BODY MASS INDEX: 19 KG/M2

## 2017-12-11 VITALS — TEMPERATURE: 98.2 F

## 2017-12-11 VITALS — DIASTOLIC BLOOD PRESSURE: 66 MMHG | HEART RATE: 74 BPM | SYSTOLIC BLOOD PRESSURE: 99 MMHG

## 2017-12-11 DIAGNOSIS — Z85.47: ICD-10-CM

## 2017-12-11 DIAGNOSIS — D16.8: Primary | ICD-10-CM

## 2017-12-11 LAB
BASOPHILS # BLD: 1.2 % (ref 0–2)
DEPRECATED RDW RBC AUTO: 13.6 % (ref 11.9–15.9)
EOSINOPHIL # BLD: 1.4 % (ref 0–4.5)
INR BLD: 0.93 (ref 0.82–1.09)
MCH RBC QN AUTO: 28.8 PG (ref 25.7–33.7)
MCHC RBC AUTO-ENTMCNC: 33.5 G/DL (ref 32–35.9)
MCV RBC: 86 FL (ref 80–96)
NEUTROPHILS # BLD: 61.2 % (ref 42.8–82.8)
PLATELET # BLD AUTO: 193 K/MM3 (ref 134–434)
PMV BLD: 7.5 FL (ref 7.5–11.1)
PT PNL PPP: 10.5 SEC (ref 9.98–11.88)
WBC # BLD AUTO: 6.7 K/MM3 (ref 4–10)

## 2017-12-11 PROCEDURE — 0QB23ZX EXCISION OF RIGHT PELVIC BONE, PERCUTANEOUS APPROACH, DIAGNOSTIC: ICD-10-PCS | Performed by: RADIOLOGY

## 2017-12-13 NOTE — PATH
Surgical Pathology Report



Patient Name:  CARRIE FLEMING

Accession #:  [id number]

Med. Rec. #:  [id number]                                                        

   /Age/Gender:  1955 (Age: 62) / M

Account:  [id number]                                                          

             Location: RADIOLOGY

Taken:  2017

Received:  2017

Reported:  2017

Physicians:  HALEY Klein M.D.

  



Specimen(s) Received

 PELVIC BONE BIOPSY 





Clinical History

62-year-old male with history of testicular cancer and now with sclerotic and

lytic pelvic lesion







Final Diagnosis

BONE, PELVIS, BIOPSY:

FRAGMENTS OF BONE WITH FOCAL MARROW FIBROSIS.

NEGATIVE FOR CARCINOMA.



Comment: Immunohistochemical stains performed and interpreted at [facility] show cytokeratin AE 1/3 is negative. History of testicular

cancer noted. Suggest clinical/radiologic correlation.







***Electronically Signed***

Sybil Fonseca M.D.





Gross Description

Received in formalin labeled "pelvic bone biopsy," are 4 tan, cylindrical

portions of bone ranging from 0.3-0.5 cm in length and averaging 0.1 cm in

diameter. The specimen is submitted in toto in one cassette, following

decalcification.

## 2018-11-12 ENCOUNTER — HOSPITAL ENCOUNTER (OUTPATIENT)
Dept: HOSPITAL 74 - JER | Age: 63
Setting detail: OBSERVATION
LOS: 2 days | Discharge: HOME | End: 2018-11-14
Attending: INTERNAL MEDICINE | Admitting: INTERNAL MEDICINE
Payer: COMMERCIAL

## 2018-11-12 VITALS — BODY MASS INDEX: 22.4 KG/M2

## 2018-11-12 DIAGNOSIS — E11.22: ICD-10-CM

## 2018-11-12 DIAGNOSIS — C61: ICD-10-CM

## 2018-11-12 DIAGNOSIS — I12.9: ICD-10-CM

## 2018-11-12 DIAGNOSIS — Z79.891: ICD-10-CM

## 2018-11-12 DIAGNOSIS — G89.29: ICD-10-CM

## 2018-11-12 DIAGNOSIS — I69.354: ICD-10-CM

## 2018-11-12 DIAGNOSIS — C79.51: ICD-10-CM

## 2018-11-12 DIAGNOSIS — M47.9: ICD-10-CM

## 2018-11-12 DIAGNOSIS — F17.210: ICD-10-CM

## 2018-11-12 DIAGNOSIS — R60.0: ICD-10-CM

## 2018-11-12 DIAGNOSIS — Z96.0: ICD-10-CM

## 2018-11-12 DIAGNOSIS — J44.9: ICD-10-CM

## 2018-11-12 DIAGNOSIS — D64.9: ICD-10-CM

## 2018-11-12 DIAGNOSIS — B18.2: ICD-10-CM

## 2018-11-12 DIAGNOSIS — N18.3: ICD-10-CM

## 2018-11-12 DIAGNOSIS — E87.5: Primary | ICD-10-CM

## 2018-11-12 LAB
ALBUMIN SERPL-MCNC: 2.8 G/DL (ref 3.4–5)
ALP SERPL-CCNC: 121 U/L (ref 45–117)
ALT SERPL-CCNC: 9 U/L (ref 13–61)
ANION GAP SERPL CALC-SCNC: 4 MMOL/L (ref 8–16)
AST SERPL-CCNC: 9 U/L (ref 15–37)
BASOPHILS # BLD: 0.7 % (ref 0–2)
BILIRUB SERPL-MCNC: 0.3 MG/DL (ref 0.2–1)
BNP SERPL-MCNC: 3125.9 PG/ML (ref 5–125)
BUN SERPL-MCNC: 21 MG/DL (ref 7–18)
CALCIUM SERPL-MCNC: 8.2 MG/DL (ref 8.5–10.1)
CHLORIDE SERPL-SCNC: 107 MMOL/L (ref 98–107)
CO2 SERPL-SCNC: 28 MMOL/L (ref 21–32)
CREAT SERPL-MCNC: 1.7 MG/DL (ref 0.55–1.3)
DEPRECATED RDW RBC AUTO: 13.7 % (ref 11.9–15.9)
EOSINOPHIL # BLD: 1.6 % (ref 0–4.5)
GLUCOSE SERPL-MCNC: 173 MG/DL (ref 74–106)
HCT VFR BLD CALC: 34.9 % (ref 35.4–49)
HGB BLD-MCNC: 11.4 GM/DL (ref 11.7–16.9)
LYMPHOCYTES # BLD: 24.4 % (ref 8–40)
MCH RBC QN AUTO: 28.4 PG (ref 25.7–33.7)
MCHC RBC AUTO-ENTMCNC: 32.5 G/DL (ref 32–35.9)
MCV RBC: 87.1 FL (ref 80–96)
MONOCYTES # BLD AUTO: 10.5 % (ref 3.8–10.2)
NEUTROPHILS # BLD: 62.8 % (ref 42.8–82.8)
PLATELET # BLD AUTO: 156 K/MM3 (ref 134–434)
PMV BLD: 7.2 FL (ref 7.5–11.1)
POTASSIUM SERPLBLD-SCNC: 5.2 MMOL/L (ref 3.5–5.1)
PROT SERPL-MCNC: 6.1 G/DL (ref 6.4–8.2)
RBC # BLD AUTO: 4.01 M/MM3 (ref 4–5.6)
SODIUM SERPL-SCNC: 139 MMOL/L (ref 136–145)
WBC # BLD AUTO: 5.6 K/MM3 (ref 4–10)

## 2018-11-12 PROCEDURE — 3E033GC INTRODUCTION OF OTHER THERAPEUTIC SUBSTANCE INTO PERIPHERAL VEIN, PERCUTANEOUS APPROACH: ICD-10-PCS | Performed by: INTERNAL MEDICINE

## 2018-11-12 PROCEDURE — 3E013GC INTRODUCTION OF OTHER THERAPEUTIC SUBSTANCE INTO SUBCUTANEOUS TISSUE, PERCUTANEOUS APPROACH: ICD-10-PCS | Performed by: INTERNAL MEDICINE

## 2018-11-12 PROCEDURE — G0378 HOSPITAL OBSERVATION PER HR: HCPCS

## 2018-11-12 RX ADMIN — PREGABALIN SCH MG: 100 CAPSULE ORAL at 23:11

## 2018-11-12 RX ADMIN — HEPARIN SODIUM SCH UNIT: 5000 INJECTION, SOLUTION INTRAVENOUS; SUBCUTANEOUS at 23:11

## 2018-11-12 NOTE — HP
Admitting History and Physical





- Primary Care Physician


PCP: Efren Kern





- Admission


History of Present Illness: 





Jose Hansen is a 64yo man with a PMH of metastatic prostate cancer (mets to 

bone), COPD, CKD3, DM2, HTN, previous CVA, spondylolisthesis, chronic pain on 

methadone who presents reporting full body swelling for the past week. He also 

complains of urinary leakage/incontinence, back pain, and an abdominal skin 

wound.











- Past Medical History


CNS: Yes: CVA


Cardiovascular: Yes: HTN


Pulmonary: Yes: COPD


Hepatobiliary: Yes: Hepatitis C


Renal/: Yes: Renal Inusuff, Other (suprapubic cath)


Infectious Disease: Yes: Other (hep C)


Psych: Yes: Addictions


Musculoskeletal: Yes: Chronic low back pain


Endocrine: Yes: Diabetes Mellitus





- Smoking History


Smoking history: Current every day smoker


Have you smoked in the past 12 months: Yes


Aproximately how many cigarettes per day: 10





- Alcohol/Substance Use


Hx Alcohol Use: No (none x 2 month)


History of Substance Use: reports: Cocaine, Heroin, Marijuana





- Social History


Occupation: not working





Home Medications





- Allergies


Allergies/Adverse Reactions: 


 Allergies











Allergy/AdvReac Type Severity Reaction Status Date / Time


 


No Known Allergies Allergy   Verified 11/12/18 09:39














- Home Medications


Home Medications: 


Ambulatory Orders





Pregabalin [Lyrica -] 200 mg PO TID 12/04/14 


Methadone [Dolophine -] 90 mg PO DAILY@0600 #0 tablet 02/11/16 


Multivitamins [Multivit ([facility] Formulary)] 1 tab PO DAILY #0 tab 02/11/16 


Amlodipine Besylate [Norvasc -] 10 mg PO DAILY #30 tablet 11/14/17 


Docusate Sodium [Colace -] 300 mg PO HS #30 capsule 11/14/17 


Lisinopril [Prinivil] 10 mg PO DAILY #30 tablet 11/14/17 


Oxybutynin Chloride [Oxybutynin Chloride ER] 5 mg PO DAILY #30 tab.er.24 11/14/ 17 


Mag Hydrox/Al Hydrox/Simeth [MAALOX *SUSPENSION* -] 30 ml PO BID #1 bottle 02/26 /18 


Lidocaine 5% Patch [Lidoderm -] 2 patch TP DAILY #60 patch 05/01/18 


Ergocalciferol (Vitamin D2) [Vitamin D2] 50,000 unit PO Q7D #4 capsule 06/19/18 


Oxycodone HCl/Acetaminophen [Percocet  mg Tablet] 1 each PO TID #90 

tablet MDD 3 10/11/18 


Torsemide [Demadex -] 20 mg PO DAILY #30 tablet 11/13/18 











Family Disease History





- Family Disease History


Family Disease History: Heart Disease: Father (alcohol), Mother, CA: Father, 

Sister (uterine ca )





Physical Examination


Vital Signs: 


 Vital Signs











Temperature  98.5 F   11/12/18 17:53


 


Pulse Rate  75   11/12/18 17:53


 


Respiratory Rate  18   11/12/18 17:53


 


Blood Pressure  140/78   11/12/18 17:53


 


O2 Sat by Pulse Oximetry (%)  98   11/12/18 17:53











Constitutional: Yes: No Distress


HENT: Yes: Atraumatic


Neck: Yes: Supple


Cardiovascular: Yes: Regular Rate and Rhythm


Respiratory: Yes: CTA Bilaterally


Gastrointestinal: Yes: Normal Bowel Sounds


Extremities: Yes: WNL


Edema: Yes


Edema: LLE: Trace, RLE: Trace


Peripheral Pulses WNL: Yes


Neurological: Yes: Alert, Oriented


Labs: 


 CBC, BMP





 11/12/18 11:04 





 11/12/18 11:04 











Imaging





- Results


X-ray: Report Reviewed


Ultrasound: Report Reviewed





Problem List





- Problems


(1) Bilateral lower extremity edema


Assessment/Plan: 


will give lasix


call nephro dr marte


cardiology consult


Code(s): R60.0 - LOCALIZED EDEMA   





(2) Chronic kidney disease


Assessment/Plan: 


patient of dr nogueira


Code(s): N18.9 - CHRONIC KIDNEY DISEASE, UNSPECIFIED   





(3) HTN (hypertension)


Assessment/Plan: 


on meds


Code(s): I10 - ESSENTIAL (PRIMARY) HYPERTENSION   


Qualifiers: 


   Hypertension type: essential hypertension   Qualified Code(s): I10 - 

Essential (primary) hypertension   





Assessment/Plan





 Laboratory Tests











  11/12/18 11/12/18





  11:04 11:04


 


WBC  5.6 


 


RBC  4.01 


 


Hgb  11.4 L 


 


Hct  34.9 L 


 


MCV  87.1 


 


MCH  28.4 


 


MCHC  32.5 


 


RDW  13.7 


 


Plt Count  156 


 


MPV  7.2 L D 


 


Absolute Neuts (auto)  3.5 


 


Neutrophils %  62.8 


 


Lymphocytes %  24.4 


 


Monocytes %  10.5 H 


 


Eosinophils %  1.6  D 


 


Basophils %  0.7 


 


Nucleated RBC %  0 


 


Sodium   139


 


Potassium   5.2 H


 


Chloride   107


 


Carbon Dioxide   28


 


Anion Gap   4 L


 


BUN   21 H


 


Creatinine   1.7 H


 


Creat Clearance w eGFR   41.04


 


Random Glucose   173 H


 


Calcium   8.2 L


 


Total Bilirubin   0.3


 


AST   9 L


 


ALT   9 L


 


Alkaline Phosphatase   121 H


 


B-Natriuretic Peptide   3125.9 H


 


Total Protein   6.1 L


 


Albumin   2.8 L








Active Medications











Generic Name Dose Route Start Last Admin





  Trade Name Freq  PRN Reason Stop Dose Admin


 


Atenolol  25 mg  11/14/18 10:00  





  Tenormin -  PO   





  DAILY [facility]   





     





     





     





     


 


Heparin Sodium (Porcine)  5,000 unit  11/12/18 22:00  11/13/18 09:51





  Heparin -  SQ   5,000 unit





  BID [facility]   Administration





     





     





     





     


 


Lidocaine  2 patch  11/13/18 10:00  11/13/18 09:50





  Lidoderm Patch -  TP   2 patch





  DAILY [facility]   Administration





     





     





     





     


 


Lisinopril  10 mg  11/13/18 10:00  11/13/18 09:15





  Prinivil  PO   10 mg





  DAILY [facility]   Administration





     





     





     





     


 


Miscellaneous  1 each  11/13/18 22:00  





  Lidoderm Patch Removal     





  DAILY@2200 [facility]   





     





     





     





     


 


Oxycodone HCl  10 mg  11/12/18 20:19  11/13/18 09:51





  Roxicodone -  PO   10 mg





  Q6H PRN   Administration





  PAIN LEVEL 4 - 6   





     





     





     


 


Pregabalin  200 mg  11/12/18 22:00  11/13/18 14:25





  Lyrica -  PO   200 mg





  TID JOHN   Administration

## 2018-11-12 NOTE — PDOC
Attending Attestation





- Resident


Resident Name: Jacqueline Ngo





- ED Attending Attestation


I have performed the following: I have examined & evaluated the patient, The 

case was reviewed & discussed with the resident, I agree w/resident's findings 

& plan, Exceptions are as noted





- HPI


HPI: 





11/12/18 10:50


64 yo male with h/o protate CA, mets to bones ckd, htn dm copd suprapubic 

cathetar, chronic pain, here today because was in hospital for a renal 

ultrasound, noted he had some leg  swelling so felt he should come to ed to get 

it checked out.   no f/c no sob no chest pain. no h/o pe or dvt.  





- Physicial Exam


PE: 





11/12/18 10:52


awake alert lungs clear bilaterally heart rrr no mr abd soft suprapubic 

cathetar in place, urine clear yellow in leg bag. abd wound ( old) clean base 

no surrounding erythema. no exudate. ext mild bilat lower extremity edema. 

pitting, Left > right 2+ dp/ pt pulses. 


11/12/18 10:58








- Medical Decision Making





11/12/18 10:54


differential worsening renal failure, chf, liver disease plan cmp cbc bnp ekg .

## 2018-11-12 NOTE — PDOC
History of Present Illness





- General


Chief Complaint: Edema


Stated Complaint: SWOLLEN LEGS


Time Seen by Provider: 11/12/18 10:03





- History of Present Illness


Initial Comments: 


Jose Hansen is a 62yo man with a PMH of metastatic prostate cancer (mets to 

bone), COPD, CKD3, DM2, HTN, previous CVA, spondylolisthesis, chronic pain on 

methadone who presents reporting full body swelling for the past week. He also 

complains of urinary leakage/incontinence, back pain, and an abdominal skin 

wound. His wife is also present at bedside.





They report that he has had increased swelling over his entire body for a week. 

There was no significant change today, but they were already at the hospital 

for a scheduled CT scan and decided to present for evaluation of the swelling. 

He has not had any pain, new difficulty walking, wounds, or new focal 

neurological deficits. He does state that his legs feel heavier than normal, 

and he has had difficulty walking and lifting his legs. It is unclear from the 

patient report whether he has previously had any LE edema. He does report 

taking all of his medications as prescribed, but he is not sure what 

medications he currently takes.





Mr Hansen is mostly concerned with his suprapubic catheter and incontinence. 

He states that Dr Jimenes changes the catheter every month, and he feels it is 

loose at this time. He wants Dr Jimenes to come down to the ED and replace it 

today. He also is very concerned that he is having urine leaking from his penis

, as he states that all urine should be coming out of the catheter. He is very 

upset about this and needing to wear a diaper. 





Mr Hansen also denies any fevers, chills, shortness of breath, chest pain, 

underlying lung or kidney disease, or any other symptoms. The only physician he 

reports seeing regularly is Dr MIGUELITO Jimenes for replacement of his suprapubic 

catheter. 





Past History





- Past Medical History


Allergies/Adverse Reactions: 


 Allergies











Allergy/AdvReac Type Severity Reaction Status Date / Time


 


No Known Allergies Allergy   Verified 11/12/18 09:39











Home Medications: 


Ambulatory Orders





Pregabalin [Lyrica -] 200 mg PO TID 12/04/14 


Methadone [Dolophine -] 90 mg PO DAILY@0600 #0 tablet 02/11/16 


Multivitamins [Multivit ([facility] Formulary)] 1 tab PO DAILY #0 tab 02/11/16 


Amlodipine Besylate [Norvasc -] 10 mg PO DAILY #30 tablet 11/14/17 


Docusate Sodium [Colace -] 300 mg PO HS #30 capsule 11/14/17 


Lisinopril [Prinivil] 10 mg PO DAILY #30 tablet 11/14/17 


Oxybutynin Chloride [Oxybutynin Chloride ER] 5 mg PO DAILY #30 tab.er.24 11/14/ 17 


Mag Hydrox/Al Hydrox/Simeth [Mylanta Suspension -] 30 ml PO BID #1 bottle 02/26/ 18 


Lidocaine 5% Patch [Lidoderm -] 2 patch TP DAILY #60 patch 05/01/18 


Ergocalciferol (Vitamin D2) [Vitamin D2] 50,000 unit PO Q7D #4 capsule 06/19/18 


Oxycodone HCl/Acetaminophen [Percocet  mg Tablet] 1 each PO TID #90 

tablet MDD 3 10/11/18 








Anemia: No


Asthma: No


Cancer: Yes (prostate, with possible bone mets)


Cardiac Disorders: Yes


CVA: Yes (mild left sided weakness)


COPD: Yes


CHF: No


Dementia: No


Diabetes: Yes (diet controlled)


GI Disorders: No


 Disorders: Yes (suprapubic tube )


HTN: Yes


Hypercholesterolemia: No


Liver Disease: No


Seizures: No


Thyroid Disease: No





- Surgical History


Abdominal Surgery: No


Lung Surgery: No


Neurologic Surgery: No


Orthopedic Surgery: Yes (back surgery 2006)





- Immunization History


Td Vaccination: Yes


TDAP Vaccination: Yes


Immunization Up to Date: Yes





- Suicide/Smoking/Psychosocial Hx


Smoking Status: Yes


Smoking History: Current every day smoker


Years of Tobacco Use: 0


Have you smoked in the past 12 months: Yes


Number of Cigarettes Smoked Daily: 10


Cigars Per Day: 2


Information on smoking cessation initiated: No


'Breaking Loose' booklet given: 11/25/15


Hx Alcohol Use: No (none x 2 month)


Drug/Substance Use Hx: Yes (none x 2 month)


Substance Use Type: Alcohol, Cocaine, Marijuana


Hx Substance Use Treatment: Yes (DETOX,REHAB,MMTP)





**Review of Systems





- Review of Systems


Comments:: 


General: No fevers, no chills, no weight or appetite change, no malaise


HEENT: No changes in vision, no changes in hearing, no congestion, no sore 

throat


CV: No chest pain, no palpitations, no LE edema


Pulm: No SOB, no cough, no wheezing


GI: No nausea or vomiting, no change in bowel habits, no melena


: +h/o retention s/p suprapubic catheter, +leakage/incontinence


Musc: +BLE edema


Skin: No rash, no lesions, no erythema


Endo: No excessive thirst, no heat/cold intolerance


Heme: No unusual bruising or bleeding, no swollen glands


Neuro: No syncope, no numbness/tingling, +chronic weakness, +chronic pain


Vasc: No claudication but minimal walking


Psych: No recent change in mood, no SI or HI











*Physical Exam





- Vital Signs


 Last Vital Signs











Temp Pulse Resp BP Pulse Ox


 


 98.6 F   82   18   187/92 H  96 


 


 11/12/18 09:37  11/12/18 09:37  11/12/18 09:37  11/12/18 09:37  11/12/18 09:37














- Physical Exam


Comments: 


General: Comfortable, no acute distress


HEENT: PERRL, EOMI, MMM, voice normal, normal neck ROM


Cards: RRR, no murmur appreciated


Pulm: Comfortable on room air. Minimal wheezing in b/l bases


Abd: Soft, nontender, nondistended


: No CVA tenderness. Suprapubic catheter in place, draining freely, clear 

yellow urine in bag.


Ext: Atraumatic. +BLE pitting edema to knees. 


Vasc: Extremities WWP. 


Skin: Normal color, no rashes or lesions


Neuro: A&Ox3, CN grossly intact, left side weaker than right


Psych: Mood appropriate to situation











ED Treatment Course





- LABORATORY


CBC & Chemistry Diagram: 


 11/12/18 11:04





 11/12/18 11:04





- RADIOLOGY


Radiology Studies Ordered: 














 Category Date Time Status


 


 CHEST PA & LAT [RAD] Stat Radiology  11/12/18 10:42 Ordered














Medical Decision Making





- Medical Decision Making





11/12/18 10:58


Jose London is a 62yo man with multiple chronic medical problem including 

prostate cancer with bone mets, COPD, CKD, DM, HTN, previous CVA, and chronic 

pain who presents reporting generalized edema.


- BLE pitting edema on exam. May be due to renal insufficiency, undiagnosed CHF

, or potentially DVT. No sign of infection. May also be chronic. It is 

difficult to determine whether Mr Hansen has chronic swelling as he is a 

very poor historian.


- CBC, CMP, BNP, EKG, CXR and b/l DVT study for evaluation


- Suprapubic catheter appears to be functioning well. Mr Hansen apparently 

has follow up scheduled with Dr MIGUELITO Jimenes already. No need for immediate 

intervention





11/12/18 14:10


- CBC unremarkable. Chemistry notable for potassium elevated to 5.2. Cr 1.7 at 

baseline (last recorded 1.8). 


- Duplex negative for DVT


- CXR without acute pathology


- Spoke to Dr Kern for admission. Requests cardiac workup with Dr Mitchell on 

consult. 


- Giving 40mg IV lasix for fluid overload and hyperkalemia. 


- Plan discussed with Mr Hansen and his wife. Both understand and agree with 

this plan.





Discussed with Dr Braga.





Jacqueline Ngo


PGY1





*DC/Admit/Observation/Transfer


Diagnosis at time of Disposition: 


 Hyperkalemia, Bilateral lower extremity edema








- Discharge Dispostion


Condition at time of disposition: Stable


Decision to Admit order: Yes





- Referrals





- Patient Instructions





- Post Discharge Activity

## 2018-11-13 LAB
ALBUMIN SERPL-MCNC: 3.1 G/DL (ref 3.4–5)
ALP SERPL-CCNC: 123 U/L (ref 45–117)
ALT SERPL-CCNC: 10 U/L (ref 13–61)
ANION GAP SERPL CALC-SCNC: 8 MMOL/L (ref 8–16)
APPEARANCE UR: (no result)
AST SERPL-CCNC: 14 U/L (ref 15–37)
BACTERIA #/AREA URNS HPF: (no result) /HPF
BASOPHILS # BLD: 0.9 % (ref 0–2)
BILIRUB SERPL-MCNC: 0.4 MG/DL (ref 0.2–1)
BILIRUB UR STRIP.AUTO-MCNC: NEGATIVE MG/DL
BUN SERPL-MCNC: 28 MG/DL (ref 7–18)
CALCIUM SERPL-MCNC: 8.7 MG/DL (ref 8.5–10.1)
CHLORIDE SERPL-SCNC: 105 MMOL/L (ref 98–107)
CO2 SERPL-SCNC: 27 MMOL/L (ref 21–32)
COLOR UR: (no result)
CREAT SERPL-MCNC: 1.9 MG/DL (ref 0.55–1.3)
CREAT UR-MCNC: 22 MG/DL (ref 2–36)
DEPRECATED RDW RBC AUTO: 13.9 % (ref 11.9–15.9)
EOSINOPHIL # BLD: 1.3 % (ref 0–4.5)
GLUCOSE SERPL-MCNC: 137 MG/DL (ref 74–106)
HCT VFR BLD CALC: 37.7 % (ref 35.4–49)
HGB BLD-MCNC: 12.2 GM/DL (ref 11.7–16.9)
KETONES UR QL STRIP: NEGATIVE
LEUKOCYTE ESTERASE UR QL STRIP.AUTO: (no result)
LYMPHOCYTES # BLD: 26.3 % (ref 8–40)
MCH RBC QN AUTO: 28.3 PG (ref 25.7–33.7)
MCHC RBC AUTO-ENTMCNC: 32.5 G/DL (ref 32–35.9)
MCV RBC: 87 FL (ref 80–96)
MONOCYTES # BLD AUTO: 8.1 % (ref 3.8–10.2)
NEUTROPHILS # BLD: 63.4 % (ref 42.8–82.8)
NITRITE UR QL STRIP: POSITIVE
PH UR: 8 [PH] (ref 5–8)
PLATELET # BLD AUTO: 156 K/MM3 (ref 134–434)
PMV BLD: 7.9 FL (ref 7.5–11.1)
POTASSIUM SERPLBLD-SCNC: 4.4 MMOL/L (ref 3.5–5.1)
PROT SERPL-MCNC: 6.6 G/DL (ref 6.4–8.2)
PROT UR QL STRIP: (no result)
PROT UR QL STRIP: NEGATIVE
RBC # BLD AUTO: 4.33 M/MM3 (ref 4–5.6)
SODIUM SERPL-SCNC: 139 MMOL/L (ref 136–145)
SP GR UR: 1.01 (ref 1.01–1.03)
TRI-PHOS CRY URNS QL MICRO: (no result) /HPF
UROBILINOGEN UR STRIP-MCNC: NEGATIVE MG/DL (ref 0.2–1)
WBC # BLD AUTO: 5.1 K/MM3 (ref 4–10)

## 2018-11-13 RX ADMIN — PREGABALIN SCH MG: 100 CAPSULE ORAL at 06:05

## 2018-11-13 RX ADMIN — PREGABALIN SCH MG: 100 CAPSULE ORAL at 14:25

## 2018-11-13 RX ADMIN — LISINOPRIL SCH MG: 10 TABLET ORAL at 09:15

## 2018-11-13 RX ADMIN — PREGABALIN SCH MG: 100 CAPSULE ORAL at 23:24

## 2018-11-13 RX ADMIN — HEPARIN SODIUM SCH UNIT: 5000 INJECTION, SOLUTION INTRAVENOUS; SUBCUTANEOUS at 09:51

## 2018-11-13 RX ADMIN — HEPARIN SODIUM SCH UNIT: 5000 INJECTION, SOLUTION INTRAVENOUS; SUBCUTANEOUS at 23:23

## 2018-11-13 RX ADMIN — LIDOCAINE SCH PATCH: 50 PATCH TOPICAL at 09:50

## 2018-11-13 NOTE — CONSULT
Consult - text type





- Consultation


Consultation Note: 





Renal Consult for CKD and volume overload





This is a 63 year old  gentleman with hx o prostate CA metastatic to 

bone, COPD, CKD (stage 3b, baseline Cr 1.4-1.8), Hepatitis C, type 2 DM, HTN 

and CVA, supra-pubic catheter who presented with complaints of LE swelling. Pt 

is not on diuretics at home. Reports that his swelling became more pronoucned 

last week. No SOB or LOBO. No flank pain. Making urine via supra-pubic catheter. 

No fever or chills. No Abd pain but has itching on his abd. 





PMHx: as above


Allergies: NKDA


Family Hx: NC


Social Hx: No T/A/D


ROS: as per HPI


 Home Medications











 Medication  Instructions  Recorded


 


Pregabalin [Lyrica -] 200 mg PO TID 12/04/14


 


Methadone [Dolophine -] 90 mg PO DAILY@0600 #0 tablet 02/11/16


 


Multivitamins [Multivit ([facility] 1 tab PO DAILY #0 tab 02/11/16





Formulary)]  


 


Amlodipine Besylate [Norvasc -] 10 mg PO DAILY #30 tablet 11/14/17


 


Docusate Sodium [Colace -] 300 mg PO HS #30 capsule 11/14/17


 


Lisinopril [Prinivil] 10 mg PO DAILY #30 tablet 11/14/17


 


Oxybutynin Chloride [Oxybutynin 5 mg PO DAILY #30 tab.er.24 11/14/17





Chloride ER]  


 


Mag Hydrox/Al Hydrox/Simeth 30 ml PO BID #1 bottle 02/26/18





[Mylanta Suspension -]  


 


Lidocaine 5% Patch [Lidoderm -] 2 patch TP DAILY #60 patch 05/01/18


 


Ergocalciferol (Vitamin D2) 50,000 unit PO Q7D #4 capsule 06/19/18





[Vitamin D2]  


 


Oxycodone HCl/Acetaminophen 1 each PO TID #90 tablet MDD 3 10/11/18





[Percocet  mg Tablet]  








 Vital Signs











Temperature  98.5 F   11/12/18 17:53


 


Pulse Rate  78   11/13/18 00:19


 


Respiratory Rate  18   11/13/18 00:19


 


Blood Pressure  134/90   11/13/18 13:00


 


O2 Sat by Pulse Oximetry (%)  98   11/13/18 00:19








 Intake & Output











 11/10/18 11/11/18 11/12/18 11/13/18





 23:59 23:59 23:59 23:59


 


Output Total    500


 


Balance    -500


 


Weight   67 kg 








NAD


awake and alert


Neck supple


no JVD


RRR, No M/R


CTA, no rales or wheeze


soft NT/ND, dressing over abd. 


+ LE swelling, on clubbing or cyanosis


supra-pubic catheter in place


 CBC, BMP





 11/13/18 07:10 





 11/13/18 07:10 





 Current Medications





Amlodipine Besylate (Norvasc -)  10 mg PO DAILY [facility]


   Last Admin: 11/13/18 09:15 Dose:  10 mg


Heparin Sodium (Porcine) (Heparin -)  5,000 unit SQ BID [facility]


   Last Admin: 11/13/18 09:51 Dose:  5,000 unit


Lidocaine (Lidoderm Patch -)  2 patch TP DAILY [facility]


   Last Admin: 11/13/18 09:50 Dose:  2 patch


Lisinopril (Prinivil)  10 mg PO DAILY [facility]


   Last Admin: 11/13/18 09:15 Dose:  10 mg


Miscellaneous (Lidoderm Patch Removal)  1 each [facility] DAILY@2200 [facility]


Oxycodone HCl (Roxicodone -)  10 mg PO Q6H PRN


   PRN Reason: PAIN LEVEL 4 - 6


   Last Admin: 11/13/18 09:51 Dose:  10 mg


Pregabalin (Lyrica -)  200 mg PO TID [facility]


   Last Admin: 11/13/18 14:25 Dose:  200 mg





63 year old  gentleman with hx o prostate CA metastatic to bone, COPD, 

CKD (stage 3b, baseline Cr 1.4-1.8), Hepatitis C, type 2 DM, HTN and CVA, supra-

pubic catheter who presented with complaints of LE swelling. 





#LE swelling r/o CHF vs. nephrotic syndrome


#CKD Stage 3


#Hypertension


#DM


#Prostate Cancer with supra-pubic catheter 





will check urine studies for Urine protein to Cr ratio 


Doppler showed no DVT


will treat LE edema with diuretics


Cardiac work up to check LVEF 


Continue Lisinopril for BP control


hold amlodpine as it can can cause Le swelling


Trend urine output 





Thank you 


Will follow





Justino Parrish DO

## 2018-11-13 NOTE — CON.CARD
Consult


Consult Specialty:: Cardiology


Referred by:: Dr. Kern


Reason for Consultation:: Cardiac evaluation





- History of Present Illness


Chief Complaint: LE edema


History of Present Illness: 








Patient is a 63 year old male of  descent with underlying history of 

prostate CA metastatic to bone, COPD, CKD, type 2 DM, HTN and CVA who presents 

with swelling of lower extremity. He also has chronic pain mostly of his back 

and is on Methadone treatment. He complains of urinary leakage  and 

incontinence. He also complains of back pain. He denies chest pain, SOB or 

palpitations. He denies paroxysmal nocturnal dyspnea or orthopnea. He denies 

fever or chills. He denies nausea, vomiting, diarrhea or abdominal pain. He 

denies headache or lightheadedness. He has suprapubic catheter which gets 

changed every month.





- History Source


History Provided By: Patient, Medical Record


Limitations to Obtaining History: No Limitations





- Past Medical History


CNS: Yes: CVA


Cardio/Vascular: Yes: HTN


Pulmonary: Yes: COPD


Hepatobiliary: Yes: Hepatitis C


Renal/: Yes: Renal Inusuff, Other (suprapubic urinary catheter)


Psych: Yes: Addictions


Musculoskeletal: Yes: Chronic low back pain


Endocrine: Yes: Diabetes Mellitus





- Past Surgical History


Additional Surgical History: back surgery





- Alcohol/Substance Use


Hx Alcohol Use: Yes (none x 2 month)


History of Substance Use: reports: Cocaine, Heroin, Marijuana





- Smoking History


Smoking history: Current every day smoker


Have you smoked in the past 12 months: Yes


Aproximately how many cigarettes per day: 10





- Social History


Usual Living Arrangement: Alone


Occupation: not working





Home Medications





- Allergies


Allergies/Adverse Reactions: 


 Allergies











Allergy/AdvReac Type Severity Reaction Status Date / Time


 


No Known Allergies Allergy   Verified 11/12/18 09:39














- Home Medications


Home Medications: 


Ambulatory Orders





Pregabalin [Lyrica -] 200 mg PO TID 12/04/14 


Methadone [Dolophine -] 90 mg PO DAILY@0600 #0 tablet 02/11/16 


Multivitamins [Multivit ([facility])] 1 tab PO DAILY #0 tab 02/11/16 


Amlodipine Besylate [Norvasc -] 10 mg PO DAILY #30 tablet 11/14/17 


Docusate Sodium [Colace -] 300 mg PO HS #30 capsule 11/14/17 


Lisinopril [Prinivil] 10 mg PO DAILY #30 tablet 11/14/17 


Oxybutynin Chloride [Oxybutynin Chloride ER] 5 mg PO DAILY #30 tab.er.24 11/14/ 17 


Mag Hydrox/Al Hydrox/Simeth [Mylanta Suspension -] 30 ml PO BID #1 bottle 02/26/ 18 


Lidocaine 5% Patch [Lidoderm -] 2 patch TP DAILY #60 patch 05/01/18 


Ergocalciferol (Vitamin D2) [Vitamin D2] 50,000 unit PO Q7D #4 capsule 06/19/18 


Oxycodone HCl/Acetaminophen [Percocet  mg Tablet] 1 each PO TID #90 

tablet MDD 3 10/11/18 











Family Disease History





- Family Disease History


Family Disease History: Heart Disease: Father (alcohol), Mother, CA: Father, 

Sister (uterine ca )





Review of Systems





- Review of Systems


Constitutional: denies: Chills, Fever


Cardiovascular: denies: Chest Pain, Palpitations, Shortness of Breath


Respiratory: denies: Cough, Hemoptysis, Orthopnea, PND, SOB, SOB on Exertion


Gastrointestinal: denies: Abdominal Pain, Constipation, Diarrhea, Melena, Nausea

, Rectal Bleeding, Vomiting


Genitourinary: denies: Dysuria, Hematuria


Neurological: denies: Dizziness, Headache, Seizure, Syncope


Vital Signs: 


 Vital Signs











Temperature  98.5 F   11/12/18 17:53


 


Pulse Rate  78   11/13/18 00:19


 


Respiratory Rate  18   11/13/18 00:19


 


Blood Pressure  180/100 H  11/13/18 09:10


 


O2 Sat by Pulse Oximetry (%)  98   11/13/18 00:19











Eyes: Yes: PERRL


HENT: Yes: Atraumatic


Neck: Yes: Supple


Respiratory: Yes: CTA Bilaterally


Gastrointestinal: Yes: Normal Bowel Sounds, Soft.  No: Tenderness


Cardiovascular: Yes: Regular Rate and Rhythm


JVD: No


Carotid Bruit: No


PMI: Non-Displaced


Heart Sounds: Yes: S1, S2.  No: Gallop


Edema: Yes


Edema: LLE: Trace, RLE: Trace





- Other Data


Labs, Other Data: 


 CBC, BMP





 11/13/18 07:10 





 11/13/18 07:10 











Normal sinus rhythm, no ST-T abnormality





Imaging





- Results


Chest X-ray: Report Reviewed (Unremarkable)


Ultrasound: Report Reviewed (No DVT)





Problem List





- Problems


(1) Bilateral lower extremity edema


Code(s): R60.0 - LOCALIZED EDEMA   





(2) Anemia


Code(s): D64.9 - ANEMIA, UNSPECIFIED   


Qualifiers: 


   Anemia type: unspecified type   Qualified Code(s): D64.9 - Anemia, 

unspecified   





(3) COPD (chronic obstructive pulmonary disease)


Code(s): J44.9 - CHRONIC OBSTRUCTIVE PULMONARY DISEASE, UNSPECIFIED   


Qualifiers: 


   COPD type: unspecified COPD   Qualified Code(s): J44.9 - Chronic obstructive 

pulmonary disease, unspecified   





(4) Chronic kidney disease


Code(s): N18.9 - CHRONIC KIDNEY DISEASE, UNSPECIFIED   





(5) Chronic use of opiate drugs therapeutic purposes


Code(s): Z79.899 - OTHER LONG TERM (CURRENT) DRUG THERAPY   





(6) HTN (hypertension)


Code(s): I10 - ESSENTIAL (PRIMARY) HYPERTENSION   


Qualifiers: 


   Hypertension type: essential hypertension   Qualified Code(s): I10 - 

Essential (primary) hypertension   





(7) Hepatitis C virus carrier state


Code(s): B18.2 - CHRONIC VIRAL HEPATITIS C   











(10) Prostate cancer metastatic to bone


Code(s): C61 - MALIGNANT NEOPLASM OF PROSTATE; C79.51 - SECONDARY MALIGNANT 

NEOPLASM OF BONE   





Assessment/Plan





1. LE edema


2. HTN


3. CKD


4. Type 2 DM


5. Post suprapubic urinary catheter


6. History of substance abuse on Methadone therapy





PLAN:


1. Continue Prinivil and Amlodipine


2. Echocardiography to assess LV/RV and valvular function


3. Diuretics as needed


4. Monitor renal function and electrolytes





Further plans are to follow


Compa Bolton MD

## 2018-11-13 NOTE — DS
Physical Examination


Vital Signs: 


 Vital Signs











Temperature  98.5 F   11/12/18 17:53


 


Pulse Rate  78   11/13/18 00:19


 


Respiratory Rate  18   11/13/18 00:19


 


Blood Pressure  134/90   11/13/18 13:00


 


O2 Sat by Pulse Oximetry (%)  98   11/13/18 00:19











Constitutional: Yes: No Distress


HENT: Yes: Atraumatic


Neck: Yes: Supple


Cardiovascular: Yes: Regular Rate and Rhythm


Respiratory: Yes: CTA Bilaterally


Gastrointestinal: Yes: Normal Bowel Sounds


Extremities: Yes: WNL


Edema: No


Peripheral Pulses WNL: Yes


Neurological: Yes: Alert, Oriented


Labs: 


 CBC, BMP





 11/13/18 07:10 





 11/13/18 07:10 











Discharge Summary


Reason For Visit: VIJI LOWER EXTREMITY EDEMA, HYPERKALEMIA


Current Active Problems





Bilateral lower extremity edema (Acute)


Hyperkalemia (Acute)








Condition: Stable





- Instructions


Referrals: 


Joy Montes De Oca MD [Primary Care Provider] - 


Arya Mitchell MD [Staff Physician] - 





- Home Medications


Comprehensive Discharge Medication List: 


Ambulatory Orders





Pregabalin [Lyrica -] 200 mg PO TID 12/04/14 


Methadone [Dolophine -] 90 mg PO DAILY@0600 #0 tablet 02/11/16 


Multivitamins [Multivit ([facility])] 1 tab PO DAILY #0 tab 02/11/16 


Amlodipine Besylate [Norvasc -] 10 mg PO DAILY #30 tablet 11/14/17 


Docusate Sodium [Colace -] 300 mg PO HS #30 capsule 11/14/17 


Lisinopril [Prinivil] 10 mg PO DAILY #30 tablet 11/14/17 


Oxybutynin Chloride [Oxybutynin Chloride ER] 5 mg PO DAILY #30 tab.er.24 11/14/ 17 


Mag Hydrox/Al Hydrox/Simeth [MAALOX *SUSPENSION* -] 30 ml PO BID #1 bottle 02/26 /18 


Lidocaine 5% Patch [Lidoderm -] 2 patch TP DAILY #60 patch 05/01/18 


Ergocalciferol (Vitamin D2) [Vitamin D2] 50,000 unit PO Q7D #4 capsule 06/19/18 


Oxycodone HCl/Acetaminophen [Percocet  mg Tablet] 1 each PO TID #90 

tablet MDD 3 10/11/18 


Torsemide [Demadex -] 20 mg PO DAILY #30 tablet 11/13/18 





seen by nephro, he wants to put him on torsemide and dc home fu in office


patient stable


fu cardiology in office for further work up

## 2018-11-13 NOTE — PN
Progress Note, Physician





- Current Medication List


Current Medications: 


Active Medications





Atenolol (Tenormin -)  25 mg PO DAILY Counts include 234 beds at the [facility]


Heparin Sodium (Porcine) (Heparin -)  5,000 unit SQ BID Counts include 234 beds at the [facility]


   Last Admin: 11/13/18 09:51 Dose:  5,000 unit


Lidocaine (Lidoderm Patch -)  2 patch TP DAILY Counts include 234 beds at the [facility]


   Last Admin: 11/13/18 09:50 Dose:  2 patch


Lisinopril (Prinivil)  10 mg PO DAILY Counts include 234 beds at the [facility]


   Last Admin: 11/13/18 09:15 Dose:  10 mg


Miscellaneous (Lidoderm Patch Removal)  1 each MC DAILY@2200 Counts include 234 beds at the [facility]


Oxycodone HCl (Roxicodone -)  10 mg PO Q6H PRN


   PRN Reason: PAIN LEVEL 4 - 6


   Last Admin: 11/13/18 09:51 Dose:  10 mg


Pregabalin (Lyrica -)  200 mg PO TID Counts include 234 beds at the [facility]


   Last Admin: 11/13/18 14:25 Dose:  200 mg











- Objective


Vital Signs: 


 Vital Signs











Temperature  98.5 F   11/12/18 17:53


 


Pulse Rate  78   11/13/18 00:19


 


Respiratory Rate  18   11/13/18 00:19


 


Blood Pressure  134/90   11/13/18 13:00


 


O2 Sat by Pulse Oximetry (%)  98   11/13/18 00:19











Labs: 


 CBC, BMP





 11/13/18 07:10 





 11/13/18 07:10 











Problem List





- Problems


(1) Bilateral lower extremity edema


Code(s): R60.0 - LOCALIZED EDEMA   





(2) Chronic kidney disease


Code(s): N18.9 - CHRONIC KIDNEY DISEASE, UNSPECIFIED   





(3) HTN (hypertension)


Code(s): I10 - ESSENTIAL (PRIMARY) HYPERTENSION   


Qualifiers: 


   Hypertension type: essential hypertension   Qualified Code(s): I10 - 

Essential (primary) hypertension

## 2018-11-13 NOTE — EKG
Test Reason : 

Blood Pressure : ***/*** mmHG

Vent. Rate : 074 BPM     Atrial Rate : 074 BPM

   P-R Int : 144 ms          QRS Dur : 076 ms

    QT Int : 396 ms       P-R-T Axes : 016 031 009 degrees

   QTc Int : 439 ms

 

NORMAL SINUS RHYTHM

NORMAL ECG

WHEN COMPARED WITH ECG OF 07-NOV-2017 15:31,

NO SIGNIFICANT CHANGE WAS FOUND

Confirmed by MD Haines Edward ([id number]) on 11/13/2018 4:34:48 PM

 

Referred By:             Confirmed By:Enmanuel Haines MD

## 2018-11-14 VITALS — HEART RATE: 77 BPM | DIASTOLIC BLOOD PRESSURE: 78 MMHG | SYSTOLIC BLOOD PRESSURE: 147 MMHG | TEMPERATURE: 98.1 F

## 2018-11-14 LAB
ALBUMIN SERPL-MCNC: 2.9 G/DL (ref 3.4–5)
ALP SERPL-CCNC: 108 U/L (ref 45–117)
ALT SERPL-CCNC: 9 U/L (ref 13–61)
ANION GAP SERPL CALC-SCNC: 8 MMOL/L (ref 8–16)
AST SERPL-CCNC: 10 U/L (ref 15–37)
BILIRUB SERPL-MCNC: 0.3 MG/DL (ref 0.2–1)
BUN SERPL-MCNC: 28 MG/DL (ref 7–18)
CALCIUM SERPL-MCNC: 8.5 MG/DL (ref 8.5–10.1)
CHLORIDE SERPL-SCNC: 105 MMOL/L (ref 98–107)
CO2 SERPL-SCNC: 27 MMOL/L (ref 21–32)
CREAT SERPL-MCNC: 1.8 MG/DL (ref 0.55–1.3)
GLUCOSE SERPL-MCNC: 151 MG/DL (ref 74–106)
MAGNESIUM SERPL-MCNC: 1.8 MG/DL (ref 1.8–2.4)
PHOSPHATE SERPL-MCNC: 3.8 MG/DL (ref 2.5–4.9)
POTASSIUM SERPLBLD-SCNC: 4.3 MMOL/L (ref 3.5–5.1)
PROT SERPL-MCNC: 6.2 G/DL (ref 6.4–8.2)
SODIUM SERPL-SCNC: 139 MMOL/L (ref 136–145)

## 2018-11-14 RX ADMIN — LIDOCAINE SCH PATCH: 50 PATCH TOPICAL at 09:09

## 2018-11-14 RX ADMIN — PREGABALIN SCH MG: 100 CAPSULE ORAL at 06:04

## 2018-11-14 RX ADMIN — LISINOPRIL SCH MG: 10 TABLET ORAL at 09:08

## 2018-11-14 RX ADMIN — HEPARIN SODIUM SCH UNIT: 5000 INJECTION, SOLUTION INTRAVENOUS; SUBCUTANEOUS at 09:09

## 2018-11-14 NOTE — PN
Progress Note, Physician


History of Present Illness: 


No complaints.





- Current Medication List


Current Medications: 


Active Medications





Atenolol (Tenormin -)  25 mg PO DAILY [facility]


   Last Admin: 11/14/18 09:08 Dose:  25 mg


Heparin Sodium (Porcine) (Heparin -)  5,000 unit SQ BID [facility]


   Last Admin: 11/14/18 09:09 Dose:  5,000 unit


Lidocaine (Lidoderm Patch -)  2 patch TP DAILY [facility]


   Last Admin: 11/14/18 09:09 Dose:  2 patch


Lisinopril (Prinivil)  10 mg PO DAILY [facility]


   Last Admin: 11/14/18 09:08 Dose:  10 mg


Methadone HCl 80 mg/ Methadone (HCl 10 mg)  90 mg PO DAILY@0600 [facility]


Miscellaneous (Lidoderm Patch Removal)  2 each MC DAILY@2200 [facility]


Oxycodone HCl (Roxicodone -)  10 mg PO Q6H PRN


   PRN Reason: PAIN LEVEL 4 - 6


   Last Admin: 11/14/18 09:08 Dose:  10 mg


Pregabalin (Lyrica -)  200 mg PO TID [facility]


   Last Admin: 11/14/18 06:04 Dose:  200 mg











- Objective


Vital Signs: 


 Vital Signs











Temperature  97.9 F   11/14/18 04:56


 


Pulse Rate  84   11/14/18 05:51


 


Respiratory Rate  18   11/14/18 05:51


 


Blood Pressure  154/98   11/14/18 05:51


 


O2 Sat by Pulse Oximetry (%)  96   11/14/18 06:00











Constitutional: Yes: No Distress, Calm, Thin


Neck: Yes: Supple


Cardiovascular: Yes: Regular Rate and Rhythm


Respiratory: Yes: Regular, Diminished


Gastrointestinal: Yes: Normal Bowel Sounds, Soft


Edema: Yes


Edema: LLE: 1+


Labs: 


 CBC, BMP





 11/13/18 07:10 





 11/14/18 07:00 











- ....Imaging


Ultrasound: Report Reviewed (No DVT bilaterally)





Problem List





- Problems


(1) Bilateral lower extremity edema


Code(s): R60.0 - LOCALIZED EDEMA   





(2) Chronic kidney disease


Code(s): N18.9 - CHRONIC KIDNEY DISEASE, UNSPECIFIED   


Qualifiers: 


   Chronic kidney disease stage: stage 3 (moderate)   Qualified Code(s): N18.3 

- Chronic kidney disease, stage 3 (moderate)   





(3) HTN (hypertension)


Code(s): I10 - ESSENTIAL (PRIMARY) HYPERTENSION   


Qualifiers: 


   Hypertension type: essential hypertension   Qualified Code(s): I10 - 

Essential (primary) hypertension   





(4) Hemiplegia as late effect of cerebrovascular accident


Code(s): I69.359 - HEMIPLGA FOLLOWING CEREBRAL INFARCTION AFFECTING UNSP SIDE   








Assessment/Plan


1. LE edema


2. HTN


3. CKD


4. Type 2 DM


5. Post suprapubic urinary catheter


6. History of substance abuse on Methadone therapy


7. H/o CVA with residual weakness





PLAN:


1. Continue Atenolol 25 qd and Prinivil 10 qd, ? why not on ASA 81 qd


2. Echocardiography to assess LV/RV and valvular function, check TSH, lipid 

panel, Ha1c


3. Wrap legs as needed with monitor renal function and electrolytes

## 2018-11-14 NOTE — PN
Progress Note (short form)





- Note


Progress Note: 





Renal Follow up for CKD and Proteinuria





Pt seen and examined at the bedside


awake and alert


feels better, still has some leg swelling


making urine


no sob, cp, abd pian, N/V/D








 Vital Signs











Temperature  98.1 F   11/14/18 10:00


 


Pulse Rate  77   11/14/18 10:00


 


Respiratory Rate  18   11/14/18 10:00


 


Blood Pressure  147/78   11/14/18 10:00


 


O2 Sat by Pulse Oximetry (%)  96   11/14/18 06:00








 Intake & Output











 11/11/18 11/12/18 11/13/18 11/14/18





 23:59 23:59 23:59 23:59


 


Output Total   500 600


 


Balance   -500 -600


 


Weight  67 kg  63.049 kg





NAD


1+ edema in LE 


 CBC, BMP





 11/13/18 07:10 





 11/14/18 07:00 





 Laboratory Tests











  11/13/18 11/14/18 11/14/18





  07:10 07:00 07:00


 


Calcium  8.7  8.5 


 


Phosphorus   3.8 


 


Magnesium   1.8 


 


Albumin  3.1 L  2.9 L 


 


NASIR M-Andreas    Pending


 


REYNALDO Screen    Pending


 


RPR Titer   


 


Hepatitis A IgM Ab    Pending


 


Hep Bs Antigen    Pending


 


Hep B Core IgM Ab    Pending


 


Hepatitis C Antibody    Pending














  11/14/18





  07:00


 


Calcium 


 


Phosphorus 


 


Magnesium 


 


Albumin 


 


NASIR M-Andreas 


 


REYNALDO Screen 


 


RPR Titer  Nonreactive


 


Hepatitis A IgM Ab 


 


Hep Bs Antigen 


 


Hep B Core IgM Ab 


 


Hepatitis C Antibody 








 Current Medications





Atenolol (Tenormin -)  25 mg PO DAILY [facility]


   Last Admin: 11/14/18 09:08 Dose:  25 mg


Heparin Sodium (Porcine) (Heparin -)  5,000 unit SQ BID [facility]


   Last Admin: 11/14/18 09:09 Dose:  5,000 unit


Lidocaine (Lidoderm Patch -)  2 patch TP DAILY [facility]


   Last Admin: 11/14/18 09:09 Dose:  2 patch


Lisinopril (Prinivil)  10 mg PO DAILY [facility]


   Last Admin: 11/14/18 09:08 Dose:  10 mg


Methadone HCl 80 mg/ Methadone (HCl 10 mg)  90 mg PO DAILY@0600 [facility]


Miscellaneous (Lidoderm Patch Removal)  2 each MC DAILY@2200 [facility]


Oxycodone HCl (Roxicodone -)  10 mg PO Q6H PRN


   PRN Reason: PAIN LEVEL 4 - 6


   Last Admin: 11/14/18 09:08 Dose:  10 mg


Pregabalin (Lyrica -)  200 mg PO TID [facility]


   Last Admin: 11/14/18 06:04 Dose:  200 mg





63 year old  gentleman with hx o prostate CA metastatic to bone, COPD, 

CKD (stage 3b, baseline Cr 1.4-1.8), Hepatitis C, type 2 DM, HTN and CVA, supra-

pubic catheter who presented with complaints of LE swelling. 





#LE swelling r/o CHF vs. nephrotic syndrome


#CKD Stage 3


#Hypertension


#DM


#Prostate Cancer with supra-pubic catheter 





Renal function stable at this time


pt noted to have nephrotic range proteiniura, serologic work up including REYNALDO, 

RPR, Hepatiitis profile sent


Continue Torsemide 20mg Daily 


Cardiology evaluation on going


pt to follow up with Dr. Roth within 2 weeks of discharge 





Thank you 


Justino Parrish DO

## 2018-11-15 LAB — HEP.C VIRUS AB: 6.3 S/CO RATIO (ref 0–0.9)

## 2019-08-27 ENCOUNTER — HOSPITAL ENCOUNTER (OUTPATIENT)
Dept: HOSPITAL 74 - YHH | Age: 64
End: 2019-08-27
Payer: COMMERCIAL

## 2019-09-13 ENCOUNTER — HOSPITAL ENCOUNTER (EMERGENCY)
Dept: HOSPITAL 74 - JERFT | Age: 64
Discharge: HOME | End: 2019-09-13
Payer: COMMERCIAL

## 2019-09-13 VITALS — SYSTOLIC BLOOD PRESSURE: 133 MMHG | TEMPERATURE: 98.9 F | HEART RATE: 85 BPM | DIASTOLIC BLOOD PRESSURE: 73 MMHG

## 2019-09-13 VITALS — BODY MASS INDEX: 21.9 KG/M2

## 2019-09-13 DIAGNOSIS — M43.6: Primary | ICD-10-CM

## 2019-09-13 DIAGNOSIS — I10: ICD-10-CM

## 2019-09-13 DIAGNOSIS — F17.210: ICD-10-CM

## 2019-09-13 DIAGNOSIS — R73.03: ICD-10-CM

## 2019-09-13 NOTE — PDOC
Rapid Medical Evaluation


Time Seen by Provider: 09/13/19 16:20


Medical Evaluation: 


 Allergies











Allergy/AdvReac Type Severity Reaction Status Date / Time


 


No Known Allergies Allergy   Verified 11/12/18 09:39











09/13/19 16:21


Patient c/o: rt neck pain since waking up 2 days ago, no improvement with bengay


Patient on brief exam:LROM with rt rotation, no midline tenderness


Patient ordered for: none


patient to proceed to the ED





**Discharge Disposition





- Diagnosis


 Neck stiffness








- Discharge Dispostion


Disposition: HOME


Condition at time of disposition: Stable





- Prescriptions


Prescriptions: 


Acetaminophen [Tylenol] 975 mg PO Q6H #30 tablet


Cyclobenzaprine HCl [Flexeril -] 10 mg PO HS #10 tablet





- Referrals


Referrals: 


Joy Montes De Oca MD [Primary Care Provider] - 





- Patient Instructions


Printed Discharge Instructions:  DI for Neck Pain


Additional Instructions: 


You have neck pain due to a muscle spasm. Please take Tylenol 975mg every 6 

hours for pain. You were also prescribed Flexeril. Please take this medication 

every 8 hours for the first day. Then take the medication before you go to bed. 

Do not drive after taking this medication as it may make you sleepy. You may 

use warm compresses on your back to help with her symptoms. Please follow-up 

with your primary care doctor. If your symptoms do not resolve in 3-5 days, 

follow-up with orthopedics. A referral has been provided for you.





Return to the emergency department if you have worsening back pain, bladder or 

bowel incontinence, numbness and tingling in her legs, changes in the way you 

walk, or any new or worsening symptoms.





- Post Discharge Activity

## 2019-09-13 NOTE — PDOC
History of Present Illness





- General


Chief Complaint: Pain, Acute


Stated Complaint: NECK PAIN


Time Seen by Provider: 09/13/19 16:20


History Source: Patient


Exam Limitations: No Limitations





Past History





- Travel


Traveled outside of the country in the last 30 days: No


Close contact w/someone who was outside of country & ill: No





- Past Medical History


Allergies/Adverse Reactions: 


 Allergies











Allergy/AdvReac Type Severity Reaction Status Date / Time


 


No Known Allergies Allergy   Verified 09/13/19 16:22











Home Medications: 


Ambulatory Orders





Pregabalin [Lyrica -] 200 mg PO TID 12/04/14 


Methadone [Dolophine -] 90 mg PO DAILY@0600 #0 tablet 02/11/16 


Multivitamins [Multivit ([facility] Formul[facility])] 1 tab PO DAILY #0 tab 02/11/16 


Lisinopril [Prinivil] 10 mg PO DAILY #30 tablet 11/14/17 


Oxybutynin Chloride [Oxybutynin Chloride ER] 5 mg PO DAILY #30 tab.er.24 11/14/ 17 


Mag Hydrox/Al Hydrox/Simeth [MAALOX *SUSPENSION* -] 30 ml PO BID #1 bottle 02/26 /18 


Atenolol [Tenormin -] 25 mg PO DAILY 11/19/18 


Docusate Sodium [Colace -] 300 mg PO HS #30 capsule 11/19/18 


Ergocalciferol (Vitamin D2) [Vitamin D2] 50,000 unit PO Q7D #4 capsule 11/19/18 


Naloxone HCl [Narcan] 4 mg NS ONCE PRN #1 spray 01/18/19 


Oxycodone HCl/Acetaminophen [Percocet  mg Tablet] 1 each PO TID #90 

tablet MDD 3 02/15/19 


Acetaminophen 325 mg PO TID #90 tablet 03/20/19 


Baclofen [Lioresal -] 10 mg PO TID PRN #90 tablet 03/20/19 


Lidocaine 5% Patch [Lidoderm Patch -] 2 patch TP DAILY #60 patch 08/14/19 


Acetaminophen [Tylenol] 975 mg PO Q6H #30 tablet 09/13/19 


Cyclobenzaprine HCl [Flexeril -] 10 mg PO HS #10 tablet 09/13/19 








Anemia: No


Asthma: No


Cancer: Yes (prostate, with possible bone mets)


Cardiac Disorders: Yes


CVA: Yes (mild left sided weakness)


COPD: Yes


CHF: No


Dementia: No


Diabetes: Yes (diet controlled)


GI Disorders: No


 Disorders: Yes (suprapubic tube )


HTN: Yes


Hypercholesterolemia: No


Liver Disease: No


Seizures: No


Thyroid Disease: No





- Surgical History


Abdominal Surgery: No


Lung Surgery: No


Neurologic Surgery: No


Orthopedic Surgery: Yes (back surgery 2006)





- Immunization History


Td Vaccination: Yes


TDAP Vaccination: Yes


Immunization Up to Date: Yes





- Suicide/Smoking/Psychosocial Hx


Smoking Status: Yes


Smoking History: Current some day smoker


Years of Tobacco Use: 0


Have you smoked in the past 12 months: Yes


Number of Cigarettes Smoked Daily: 10


Cigars Per Day: 2


Information on smoking cessation initiated: No


'Breaking Loose' booklet given: 11/25/15


Hx Alcohol Use: No


Drug/Substance Use Hx: No


Substance Use Type: None


Hx Substance Use Treatment: Yes (detox, rehab, methadone program)





**Review of Systems





- Review of Systems


Able to Perform ROS?: Yes


Comments:: 





09/13/19 17:19


CONSTITUTIONAL: 


Absent: fever, chills, diaphoresis, generalized weakness, malaise, loss of 

appetite


HEENT: 


Absent: rhinorrhea, nasal congestion, throat pain, throat swelling, difficulty 

swallowing, mouth swelling, ear pain, eye pain, visual Changes


CARDIOVASCULAR: 


Absent: chest pain, loss of consciousness, palpitations, irregular heart rate, 

peripheral edema


RESPIRATORY: 


Absent: cough, shortness of breath, dyspnea with exertion, orthopnea, wheezing, 

stridor, hemoptysis


GASTROINTESTINAL:


Absent: abdominal pain, abdominal distension, nausea, vomiting, diarrhea, 

constipation, melena, hematochezia


GENITOURINARY: 


Absent: dysuria, frequency, urgency, hesitancy, hematuria, flank pain, genital 

pain


MUSCULOSKELETAL: 


Present: neck pain Absent: myalgia, arthralgia, joint swelling


SKIN: 


Absent: rash, itching, pallor


HEMATOLOGIC/IMMUNOLOGIC: 


Absent: easy bleeding, easy bruising, lymphadenopathy, frequent infections


ENDOCRINE:


Absent: unexplained weight gain, unexplained weight loss, heat intolerance, 

cold intolerance


NEUROLOGIC: 


Absent: headache, focal weakness or paresthesias, dizziness, unsteady gait, 

seizure, mental status changes, bladder or bowel incontinence


PSYCHIATRIC: 


Absent: anxiety, depression, suicidal or homicidal ideation, hallucinations.








Is the patient limited English proficient: No





*Physical Exam





- Vital Signs


 Last Vital Signs











Temp Pulse Resp BP Pulse Ox


 


 98.9 F   85   16   133/73   99 


 


 09/13/19 16:22  09/13/19 16:22  09/13/19 16:22  09/13/19 16:22  09/13/19 16:22














- Physical Exam


Comments: 





09/13/19 17:23


GENERAL: The patient is awake, alert, and fully oriented, in no acute distress.


HEAD: Normal with no signs of trauma.


EYES: Pupils equal, round and reactive to light, extraocular movements intact, 

sclera anicteric, conjunctiva clear.


EXTREMITIES: Neck with tenderness to the r trapezius muscle. No midline 

tenderness. Decreased ROM rotationally to the R d/t pain.  Normal range of 

motion, no edema.


NEUROLOGICAL: Normal speech, normal gait.


PSYCH: Normal mood, normal affect.


SKIN: Warm, Dry, normal turgor, no rashes or lesions noted.





Medical Decision Making





- Medical Decision Making





09/13/19 17:25


Patient is a 64-year-old male with multiple medical comorbidities, who presents 

to the ER with right-sided neck pain for 2 days. Patient states he woke up with 

the pain and got worse over the last 2 days. He states it hurts to turn his 

head to the right. Denies trauma or fall. Denies fevers, chills, numbness and 

tingling weakness the upper extremity.





A/P: neck stiffness





On exam to palpation of the right trapezius muscle. Decreased range of motion 

with rotational movements of the right.


No midline tenderness.


Likely a muscle spasm of the right trapezius muscle. Patient is able to touch 

his chin to his chest.


Tylenol and muscle relaxers given in the ER with relief of symptoms.


Patient felt with his primary care doctor.


Discharge home


I discussed the physical exam findings, ancillary test results and final 

diagnoses with the patient. I answered all of the patient's questions. The 

patient was satisfied with the care received and felt comfortable with the 

discharge plan and treatment plan.  The Patient agrees to follow up with the 

primary care physician/specialist within 24-72 hours. Return precautions were 

given.





*DC/Admit/Observation/Transfer


Diagnosis at time of Disposition: 


 Neck stiffness








- Discharge Dispostion


Disposition: HOME


Condition at time of disposition: Stable


Decision to Admit order: No





- Prescriptions


Prescriptions: 


Acetaminophen [Tylenol] 975 mg PO Q6H #30 tablet


Cyclobenzaprine HCl [Flexeril -] 10 mg PO HS #10 tablet





- Referrals


Referrals: 


Joy Montes De Oca MD [Primary Care Provider] - 





- Patient Instructions


Printed Discharge Instructions:  DI for Neck Pain


Additional Instructions: 


You have neck pain due to a muscle spasm. Please take Tylenol 975mg every 6 

hours for pain. You were also prescribed Flexeril. Please take this medication 

every 8 hours for the first day. Then take the medication before you go to bed. 

Do not drive after taking this medication as it may make you sleepy. You may 

use warm compresses on your back to help with her symptoms. Please follow-up 

with your primary care doctor. If your symptoms do not resolve in 3-5 days, 

follow-up with orthopedics. A referral has been provided for you.





Return to the emergency department if you have worsening back pain, bladder or 

bowel incontinence, numbness and tingling in her legs, changes in the way you 

walk, or any new or worsening symptoms.





- Post Discharge Activity

## 2020-01-09 ENCOUNTER — HOSPITAL ENCOUNTER (INPATIENT)
Dept: HOSPITAL 74 - JER | Age: 65
LOS: 6 days | Discharge: HOME | DRG: 469 | End: 2020-01-15
Attending: INTERNAL MEDICINE | Admitting: INTERNAL MEDICINE
Payer: COMMERCIAL

## 2020-01-09 VITALS — BODY MASS INDEX: 20.7 KG/M2

## 2020-01-09 DIAGNOSIS — E78.5: ICD-10-CM

## 2020-01-09 DIAGNOSIS — N39.0: ICD-10-CM

## 2020-01-09 DIAGNOSIS — N18.3: ICD-10-CM

## 2020-01-09 DIAGNOSIS — R33.9: ICD-10-CM

## 2020-01-09 DIAGNOSIS — E87.5: ICD-10-CM

## 2020-01-09 DIAGNOSIS — F11.20: ICD-10-CM

## 2020-01-09 DIAGNOSIS — J44.9: ICD-10-CM

## 2020-01-09 DIAGNOSIS — B18.2: ICD-10-CM

## 2020-01-09 DIAGNOSIS — E11.9: ICD-10-CM

## 2020-01-09 DIAGNOSIS — N17.9: Primary | ICD-10-CM

## 2020-01-09 DIAGNOSIS — C79.51: ICD-10-CM

## 2020-01-09 DIAGNOSIS — F39: ICD-10-CM

## 2020-01-09 DIAGNOSIS — D72.825: ICD-10-CM

## 2020-01-09 DIAGNOSIS — I12.9: ICD-10-CM

## 2020-01-09 DIAGNOSIS — Z91.14: ICD-10-CM

## 2020-01-09 DIAGNOSIS — J02.9: ICD-10-CM

## 2020-01-09 DIAGNOSIS — L03.90: ICD-10-CM

## 2020-01-09 DIAGNOSIS — G47.00: ICD-10-CM

## 2020-01-09 DIAGNOSIS — C61: ICD-10-CM

## 2020-01-09 DIAGNOSIS — D64.9: ICD-10-CM

## 2020-01-09 DIAGNOSIS — R63.6: ICD-10-CM

## 2020-01-09 DIAGNOSIS — G89.29: ICD-10-CM

## 2020-01-09 LAB
ALBUMIN SERPL-MCNC: 3.4 G/DL (ref 3.4–5)
ALP SERPL-CCNC: 135 U/L (ref 45–117)
ALT SERPL-CCNC: 8 U/L (ref 13–61)
ANION GAP SERPL CALC-SCNC: 5 MMOL/L (ref 8–16)
APPEARANCE UR: (no result)
AST SERPL-CCNC: 9 U/L (ref 15–37)
BACTERIA # UR AUTO: 5827.2 /HPF
BASOPHILS # BLD: 1.2 % (ref 0–2)
BILIRUB SERPL-MCNC: 0.3 MG/DL (ref 0.2–1)
BILIRUB UR STRIP.AUTO-MCNC: NEGATIVE MG/DL
BNP SERPL-MCNC: 7721.1 PG/ML (ref 5–125)
BUN SERPL-MCNC: 53.6 MG/DL (ref 7–18)
CALCIUM SERPL-MCNC: 8.6 MG/DL (ref 8.5–10.1)
CASTS URNS QL MICRO: 12 /LPF (ref 0–8)
CHLORIDE SERPL-SCNC: 109 MMOL/L (ref 98–107)
CO2 SERPL-SCNC: 26 MMOL/L (ref 21–32)
COLOR UR: YELLOW
CREAT SERPL-MCNC: 4.5 MG/DL (ref 0.55–1.3)
CRYSTALS URNS QL MICRO: (no result) /HPF
DEPRECATED RDW RBC AUTO: 15.2 % (ref 11.9–15.9)
EOSINOPHIL # BLD: 2.4 % (ref 0–4.5)
EPITH CASTS URNS QL MICRO: 3 /HPF
GLUCOSE SERPL-MCNC: 96 MG/DL (ref 74–106)
HCT VFR BLD CALC: 27.8 % (ref 35.4–49)
HGB BLD-MCNC: 9 GM/DL (ref 11.7–16.9)
KETONES UR QL STRIP: NEGATIVE
LEUKOCYTE ESTERASE UR QL STRIP.AUTO: (no result)
LYMPHOCYTES # BLD: 22.9 % (ref 8–40)
MCH RBC QN AUTO: 28.2 PG (ref 25.7–33.7)
MCHC RBC AUTO-ENTMCNC: 32.3 G/DL (ref 32–35.9)
MCV RBC: 87.2 FL (ref 80–96)
MONOCYTES # BLD AUTO: 7.3 % (ref 3.8–10.2)
NEUTROPHILS # BLD: 66.2 % (ref 42.8–82.8)
NITRITE UR QL STRIP: POSITIVE
PH UR: 8 [PH] (ref 5–8)
PLATELET # BLD AUTO: 161 K/MM3 (ref 134–434)
PMV BLD: 9.3 FL (ref 7.5–11.1)
POTASSIUM SERPLBLD-SCNC: 4.7 MMOL/L (ref 3.5–5.1)
PROT SERPL-MCNC: 6.8 G/DL (ref 6.4–8.2)
PROT UR QL STRIP: (no result)
PROT UR QL STRIP: NEGATIVE
RBC # BLD AUTO: 2 /HPF (ref 0–4)
RBC # BLD AUTO: 3.19 M/MM3 (ref 4–5.6)
SODIUM SERPL-SCNC: 139 MMOL/L (ref 136–145)
SP GR UR: 1.01 (ref 1.01–1.03)
UROBILINOGEN UR STRIP-MCNC: 0.2 MG/DL (ref 0.2–1)
WBC # BLD AUTO: 5.6 K/MM3 (ref 4–10)
WBC # UR AUTO: 265 /HPF (ref 0–5)
YEAST BUDDING URNS QL: (no result)

## 2020-01-09 PROCEDURE — G0480 DRUG TEST DEF 1-7 CLASSES: HCPCS

## 2020-01-09 PROCEDURE — A9503 TC99M MEDRONATE: HCPCS

## 2020-01-09 NOTE — PDOC
Documentation entered by Grace Sheets SCRIBE, acting as scribe for Fercho Tran MD.








Fercho Tran MD:  This documentation has been prepared by the Kortney chowdhury Brenda, SCRIBE, under my direction and personally reviewed by me in its 

entirety.  I confirm that the documentation accurately reflects all work, 

treatment, procedures, and medical decision making performed by me.  





Attending Attestation





- Resident


Resident Name: Tashi Manriquez





- ED Attending Attestation


I have performed the following: I have examined & evaluated the patient, The 

case was reviewed & discussed with the resident, I agree w/resident's findings 

& plan, Exceptions are as noted





- HPI


HPI: 





01/09/20 20:10


The patient is a 64 year old male, with a significant PMH of HTN, HLD , CKD, 

urinary retention with a suprapubic catheter and opiate use disorder who 

presents to the emergency department due to elevated creatnine. Per Dr. Heck, 

pt's creatinine was found to be 5 on blood work that was done on 1/2/19. 

Patient reports being unsure why labs were drawn at the time. Patient is 

unaware of most of his medical history. 





The patient denies chest pain, shortness of breath, headache and dizziness. 

Denies fever, chills, nausea, vomiting, diarrhea and constipation. Denies 

dysuria, frequency, urgency and hematuria. Denies focal weakness/numbness. 

Denies any other symptoms. 





Allergies: NKA


Past surgical history:


Social history: No reported














- Physicial Exam


PE: 





01/09/20 20:30


Agree with resident exam 





- Medical Decision Making





01/09/20 22:21


64-year-old male presents emergency department with acute kidney injury.  

Patient sent in for admission by Dr. Heck.  Patient's baseline creatinine is 

1.7.He is asymptomatic.





We will repeat the patient's labs, anticipate admission if creatinine is 

consistent with outpatient labs.





**Heart Score/ECG Review


  ** #1





01/09/20 22:22


Twelve-lead EKG was performed and reviewed by me.  Normal sinus rhythm, rate 

70.  Normal axis and intervals.  No ST elevations or T wave inversions.  No 

peaked T waves.

## 2020-01-09 NOTE — PDOC
History of Present Illness





- General


Stated Complaint: SENT BY DR MCKEON, KIDNEY PROBLEM


Time Seen by Provider: 01/09/20 18:34


History Source: Patient





- History of Present Illness


Initial Comments: 





01/09/20 19:31


Mr. Hansen is a 65 y/o man w/hx opiate use disorder, HTN, HLD, CKD, urinary 

retention w/suprapubic catheter presents on urging from s physician after 

Creatinine was found to be 5.0 on bloodwork done on 1/2/19. He is unsure why 

labs were drawn at that time, does not have a PCP, and is unable to relate much 

of his medical history. He denies any symptoms at this time, no fevers, chills, 

N/V/D, sob, chest pain, or palpitations. 











Past History





- Past Medical History


Allergies/Adverse Reactions: 


 Allergies











Allergy/AdvReac Type Severity Reaction Status Date / Time


 


No Known Allergies Allergy   Verified 01/09/20 18:41











Home Medications: 


Ambulatory Orders





Atenolol [Tenormin -] 25 mg PO DAILY 11/19/18 


Docusate Sodium [Colace -] 300 mg PO HS #30 capsule 11/19/18 


Amlodipine Besylate [Norvasc -] 10 mg PO DAILY 10/09/19 


Atorvastatin Ca [Lipitor] 40 mg PO HS 10/09/19 


Budesonide/Formeterol Fumarate [SYMBICORT 160/4.5mcg -] 1 inh PO BID 10/09/19 


Glimepiride [Amaryl -] 2 mg PO DAILY 10/09/19 


Insulin Glargine,Hum.rec.anlog [Basaglar Kwikpen U-100] 15 unit SQ HS 10/09/19 


Methadone [Dolophine -] 60 mg PO DAILY@0600 10/09/19 


Multivitamins [Multivit ([facility])] 1 tab PO DAILY #30 tab 10/09/19 


Tamsulosin HCl [Flomax] 0.4 mg PO DAILY 10/09/19 


Acetaminophen 500 mg PO QID PRN #120 tablet MDD 4 12/04/19 


Cyclobenzaprine HCl [Flexeril -] 10 mg PO HS #10 tablet 12/04/19 


Ergocalciferol (Vitamin D2) [Vitamin D2] 50,000 unit PO Q7D #4 capsule 12/04/19 


Lidocaine 5% Patch [Lidoderm Patch -] 2 patch TP DAILY #60 patch MDD 2 12/04/19 


Pregabalin [Lyrica -] 75 mg PO TID 12/04/19 








Anemia: No


Asthma: No


Cancer: Yes (prostate, with possible bone mets)


Cardiac Disorders: Yes


CVA: Yes (mild left sided weakness)


COPD: Yes


CHF: No


Dementia: No


Diabetes: Yes (on insulin)


GI Disorders: No


 Disorders: Yes (suprapubic tube )


HTN: Yes


Hypercholesterolemia: No


Liver Disease: Yes (NO VIRAL LOAD)


Seizures: No


Thyroid Disease: No





- Surgical History


Abdominal Surgery: No


Lung Surgery: No


Neurologic Surgery: No


Orthopedic Surgery: Yes (back surgery 2006)





- Immunization History


Td Vaccination: Yes


TDAP Vaccination: Yes


Immunization Up to Date: Yes





- Psycho Social/Smoking Cessation Hx


Smoking Status: Yes


Smoking History: Never smoked


Years of Tobacco Use: 0


Have you smoked in the past 12 months: Yes


Number of Cigarettes Smoked Daily: 10


Cigars Per Day: 2


'Breaking Loose' booklet given: 11/25/15


Hx Alcohol Use: No


Drug/Substance Use Hx: No


Substance Use Type: None


Hx Substance Use Treatment: Yes (detox, rehab, methadone program)





**Review of Systems





- Review of Systems


Able to Perform ROS?: Yes


Comments:: 





01/09/20 19:39


ROS: 


GENERAL/CONSTITUTIONAL: No fever or chills. No weakness.


HEAD, EYES, EARS, NOSE AND THROAT: No change in vision. No ear pain or 

discharge. No sore throat.


CARDIOVASCULAR: No chest pain or shortness of breath


RESPIRATORY: No cough, wheezing, or hemoptysis.


GASTROINTESTINAL: No nausea, vomiting, diarrhea or constipation.


GENITOURINARY: No dysuria, frequency, or change in urination.


MUSCULOSKELETAL: No joint or muscle swelling or pain. No neck or back pain.


SKIN: No rash


NEUROLOGIC: No headache, vertigo, loss of consciousness, or change in strength/

sensation.


ENDOCRINE: No increased thirst. No abnormal weight change


HEMATOLOGIC/LYMPHATIC: No anemia, easy bleeding, or history of blood clots.


ALLERGIC/IMMUNOLOGIC: No hives or skin allergy.








*Physical Exam





- Vital Signs


 Last Vital Signs











Temp Pulse Resp BP Pulse Ox


 


 98.1 F   89   18   144/61   98 


 


 01/09/20 18:37  01/09/20 18:37  01/09/20 18:37  01/09/20 18:37  01/09/20 18:37














- Physical Exam





01/09/20 19:39


PE: 


GENERAL: Awake, alert, and fully oriented, in no acute distress


HEAD: No signs of trauma, normocephalic, atraumatic 


EYES: PERRLA, EOMI, sclera anicteric, conjunctiva clear


ENT: Auricles normal inspection, hearing grossly normal, nares patent, 

oropharynx clear without exudates. Moist mucosa


NECK: Normal ROM, supple, no lymphadenopathy, JVD, or masses


LUNGS: No distress, speaks full sentences, clear to auscultation bilaterally 


HEART: Regular rate and rhythm, normal S1 and S2, no murmurs, rubs or gallops, 

peripheral pulses normal and equal bilaterally. 


ABDOMEN: Suprapubic catheter appreciated. Urine yellow in color. No surrounding 

surrounding erythema or tenderness. Large healed scar on middle abdomen. Soft, 

nontender, normoactive bowel sounds.  No guarding, no rebound.  No masses


EXTREMITIES : Normal inspection, Normal range of motion, no edema.  No clubbing 

or cyanosis


NEUROLOGICAL: Cranial nerves II through XII grossly intact.  Normal speech, 

normal gait, no focal sensorimotor deficits 


SKIN: Warm, Dry, normal turgor, no rashes or lesions noted








**Heart Score/ECG Review





- ECG Intrepretation


Rhythm: Regular Rhythm





- Axis


Axis: Normal





- QRS


Measured at (milliseconds): 86





- ST and T


Early Repolarization: No


Non Specific ST-T Wave changes: No


Flattened T Waves: No


Prolonged Q-T Interval: No





- ECG Impressions


Normal ECG: Yes


Non-specific ST Elevation: No


Ischemic Changes: No





ED Treatment Course





- LABORATORY


CBC & Chemistry Diagram: 


 01/09/20 19:50





 01/09/20 19:50





Medical Decision Making





- Medical Decision Making





01/09/20 19:41


64M w/hx CKD, suprapubic catheter for urinary retention presenting after 

bloodwork noted creatinine of 5.0 by Dr. Maria R Art. 





Plan: 


CBC


CMP


UA


Urine osm


Urine Na


Urine Cr





Dispo: 


admit





01/09/20 22:06


CBC - wnl


CMP - Creatinine - 4.5 (Baseline 1.7). BUN - 53.6.  No K, Na derangements noted


EKG - NSR, no axis deviations, no ischemic changes


UA, Troponin - pending





Plan for admission for acute renal failure pending troponin





---





FeNa - 5.8%


Wife contact phone number (Home) - [phone]








Discharge





- Discharge Information


Problems reviewed: Yes


Clinical Impression/Diagnosis: 


Acute kidney failure


Qualifiers:


 Acute renal failure type: unspecified Qualified Code(s): N17.9 - Acute kidney 

failure, unspecified





Condition: Stable





- Admission


Yes





- Follow up/Referral


Referrals: 


Joy Montes De Oca MD [Primary Care Provider] - 





- Patient Discharge Instructions





- Post Discharge Activity

## 2020-01-09 NOTE — PDOC
Rapid Medical Evaluation


Time Seen by Provider: 01/09/20 18:34


Medical Evaluation: 


 Allergies











Allergy/AdvReac Type Severity Reaction Status Date / Time


 


No Known Allergies Allergy   Verified 09/13/19 16:22











01/09/20 18:40


pt c/o: sent by hem/onc for elevated kidney levels, no urinary complaints


Pt on brief exam: vss,


Pt ordered for: labs, urine, iv, ekg


Pt to proceed to the ED





**Discharge Disposition





- Diagnosis


Acute kidney failure


Qualifiers:


 Acute renal failure type: unspecified Qualified Code(s): N17.9 - Acute kidney 

failure, unspecified








- Discharge Dispostion


Disposition: HOME


Condition at time of disposition: Stable





- Referrals





- Patient Instructions





- Post Discharge Activity

## 2020-01-10 LAB
ALBUMIN SERPL-MCNC: 3.2 G/DL (ref 3.4–5)
ALP SERPL-CCNC: 131 U/L (ref 45–117)
ALT SERPL-CCNC: 8 U/L (ref 13–61)
ANION GAP SERPL CALC-SCNC: 6 MMOL/L (ref 8–16)
AST SERPL-CCNC: 9 U/L (ref 15–37)
BASOPHILS # BLD: 0.8 % (ref 0–2)
BILIRUB SERPL-MCNC: 0.3 MG/DL (ref 0.2–1)
BUN SERPL-MCNC: 54.1 MG/DL (ref 7–18)
CALCIUM SERPL-MCNC: 9.1 MG/DL (ref 8.5–10.1)
CHLORIDE SERPL-SCNC: 110 MMOL/L (ref 98–107)
CO2 SERPL-SCNC: 25 MMOL/L (ref 21–32)
CREAT SERPL-MCNC: 4.5 MG/DL (ref 0.55–1.3)
DEPRECATED RDW RBC AUTO: 14.9 % (ref 11.9–15.9)
EOSINOPHIL # BLD: 2.7 % (ref 0–4.5)
GLUCOSE SERPL-MCNC: 126 MG/DL (ref 74–106)
HCT VFR BLD CALC: 28.9 % (ref 35.4–49)
HGB BLD-MCNC: 9.4 GM/DL (ref 11.7–16.9)
IRON SERPL-MCNC: 24 UG/DL (ref 50–175)
LYMPHOCYTES # BLD: 16.4 % (ref 8–40)
MAGNESIUM SERPL-MCNC: 2.2 MG/DL (ref 1.8–2.4)
MCH RBC QN AUTO: 28.1 PG (ref 25.7–33.7)
MCHC RBC AUTO-ENTMCNC: 32.6 G/DL (ref 32–35.9)
MCV RBC: 86.2 FL (ref 80–96)
MONOCYTES # BLD AUTO: 5.3 % (ref 3.8–10.2)
NEUTROPHILS # BLD: 74.8 % (ref 42.8–82.8)
PHOSPHATE SERPL-MCNC: 4.6 MG/DL (ref 2.5–4.9)
PLATELET # BLD AUTO: 155 K/MM3 (ref 134–434)
PMV BLD: 8.8 FL (ref 7.5–11.1)
POTASSIUM SERPLBLD-SCNC: 4.4 MMOL/L (ref 3.5–5.1)
PROT SERPL-MCNC: 6.6 G/DL (ref 6.4–8.2)
RBC # BLD AUTO: 3.35 M/MM3 (ref 4–5.6)
RETICS # AUTO: 0.82 % (ref 0.5–1.5)
SODIUM SERPL-SCNC: 140 MMOL/L (ref 136–145)
TIBC SERPL-MCNC: 265 UG/DL (ref 250–450)
WBC # BLD AUTO: 6.4 K/MM3 (ref 4–10)

## 2020-01-10 RX ADMIN — IPRATROPIUM BROMIDE AND ALBUTEROL SULFATE SCH AMP: .5; 3 SOLUTION RESPIRATORY (INHALATION) at 20:34

## 2020-01-10 RX ADMIN — INSULIN ASPART SCH: 100 INJECTION, SOLUTION INTRAVENOUS; SUBCUTANEOUS at 23:04

## 2020-01-10 RX ADMIN — HEPARIN SODIUM SCH UNIT: 5000 INJECTION, SOLUTION INTRAVENOUS; SUBCUTANEOUS at 06:56

## 2020-01-10 RX ADMIN — INSULIN ASPART SCH: 100 INJECTION, SOLUTION INTRAVENOUS; SUBCUTANEOUS at 07:28

## 2020-01-10 RX ADMIN — HEPARIN SODIUM SCH UNIT: 5000 INJECTION, SOLUTION INTRAVENOUS; SUBCUTANEOUS at 14:28

## 2020-01-10 RX ADMIN — PANTOPRAZOLE SODIUM SCH MG: 40 TABLET, DELAYED RELEASE ORAL at 18:07

## 2020-01-10 RX ADMIN — ACETAMINOPHEN PRN MG: 325 TABLET ORAL at 14:48

## 2020-01-10 RX ADMIN — INSULIN ASPART SCH: 100 INJECTION, SOLUTION INTRAVENOUS; SUBCUTANEOUS at 17:16

## 2020-01-10 RX ADMIN — SODIUM CHLORIDE SCH MLS/HR: 4.5 INJECTION, SOLUTION INTRAVENOUS at 11:32

## 2020-01-10 RX ADMIN — HEPARIN SODIUM SCH UNIT: 5000 INJECTION, SOLUTION INTRAVENOUS; SUBCUTANEOUS at 23:03

## 2020-01-10 RX ADMIN — INSULIN ASPART SCH: 100 INJECTION, SOLUTION INTRAVENOUS; SUBCUTANEOUS at 11:04

## 2020-01-10 RX ADMIN — ATORVASTATIN CALCIUM SCH MG: 10 TABLET, FILM COATED ORAL at 23:03

## 2020-01-10 RX ADMIN — SODIUM CHLORIDE SCH MLS/HR: 4.5 INJECTION, SOLUTION INTRAVENOUS at 23:05

## 2020-01-10 RX ADMIN — DOCUSATE SODIUM SCH MG: 100 CAPSULE, LIQUID FILLED ORAL at 23:03

## 2020-01-10 NOTE — PN
Physical Exam: 


SUBJECTIVE: Patient seen and examined








OBJECTIVE:





 Vital Signs











 Period  Temp  Pulse  Resp  BP Sys/Roland  Pulse Ox


 


 Last 24 Hr  98.1 F  89  18  144/61  98











GENERAL: The patient is awake, alert, and fully oriented, in no acute distress.


HEAD: Normal with no signs of trauma.


EYES: PERRL, extraocular movements intact, sclera anicteric, conjunctiva clear. 

No ptosis. 


ENT: Ears normal, nares patent, oropharynx clear without exudates, moist mucous 


membranes.


NECK: Trachea midline, full range of motion, supple. 


LUNGS: Breath sounds equal, clear to auscultation bilaterally, no wheezes, no 

crackles, no 


accessory muscle use. 


HEART: Regular rate and rhythm, S1, S2 without murmur, rub or gallop.


ABDOMEN: Soft, nontender, nondistended, normoactive bowel sounds, no guarding, 

no 


rebound, no hepatosplenomegaly, no masses.


EXTREMITIES: 2+ pulses, warm, well-perfused, no edema. 


NEUROLOGICAL: Cranial nerves II through XII grossly intact. Normal speech, gait 

not 


observed.


PSYCH: Normal mood, normal affect.


SKIN: Warm, dry, normal turgor, no rashes or lesions noted














 Laboratory Results - last 24 hr











  01/09/20 01/09/20 01/09/20





  19:50 19:50 21:20


 


WBC  5.6  


 


RBC  3.19 L  


 


Hgb  9.0 L  


 


Hct  27.8 L  


 


MCV  87.2  


 


MCH  28.2  


 


MCHC  32.3  


 


RDW  15.2  


 


Plt Count  161  


 


MPV  9.3  


 


Absolute Neuts (auto)  3.7  


 


Neutrophils %  66.2  


 


Lymphocytes %  22.9  D  


 


Monocytes %  7.3  


 


Eosinophils %  2.4  


 


Basophils %  1.2  


 


Nucleated RBC %  0  


 


Sodium   139 


 


Potassium   4.7 


 


Chloride   109 H 


 


Carbon Dioxide   26 


 


Anion Gap   5 L 


 


BUN   53.6 H 


 


Creatinine   4.5 H 


 


Est GFR (CKD-EPI)AfAm   15.00 


 


Est GFR (CKD-EPI)NonAf   12.94 


 


Random Glucose   96 


 


Calcium   8.6 


 


Total Bilirubin   0.3 


 


AST   9 L 


 


ALT   8 L 


 


Alkaline Phosphatase   135 H 


 


Creatine Kinase   67 


 


Troponin I   < 0.02 


 


B-Natriuretic Peptide   7721.1 H 


 


Total Protein   6.8 


 


Albumin   3.4 


 


Urine Color    Yellow


 


Urine Appearance    Turbid


 


Urine pH    8.0


 


Ur Specific Gravity    1.013


 


Urine Protein    2+ H


 


Urine Glucose (UA)    Negative


 


Urine Ketones    Negative


 


Urine Blood    1+ H


 


Urine Nitrite    Positive H


 


Urine Bilirubin    Negative


 


Urine Urobilinogen    0.2


 


Ur Leukocyte Esterase    2+ H


 


Urine WBC (Auto)    265


 


Urine RBC (Auto)    2


 


Urine Casts (Auto)    12


 


U Pathogenic Cast Auto    None seen


 


U Epithel Cells (Auto)    3.0


 


Urine Crystals (Auto)    Moderate


 


Urine Bacteria (Auto)    5827.2


 


Urine Yeast (Auto)    None seen


 


Urine Osmolality   


 


Ur Random Creatinine   


 


Ur Random Sodium   














  01/09/20 01/09/20 01/09/20





  21:20 21:20 21:20


 


WBC   


 


RBC   


 


Hgb   


 


Hct   


 


MCV   


 


MCH   


 


MCHC   


 


RDW   


 


Plt Count   


 


MPV   


 


Absolute Neuts (auto)   


 


Neutrophils %   


 


Lymphocytes %   


 


Monocytes %   


 


Eosinophils %   


 


Basophils %   


 


Nucleated RBC %   


 


Sodium   


 


Potassium   


 


Chloride   


 


Carbon Dioxide   


 


Anion Gap   


 


BUN   


 


Creatinine   


 


Est GFR (CKD-EPI)AfAm   


 


Est GFR (CKD-EPI)NonAf   


 


Random Glucose   


 


Calcium   


 


Total Bilirubin   


 


AST   


 


ALT   


 


Alkaline Phosphatase   


 


Creatine Kinase   


 


Troponin I   


 


B-Natriuretic Peptide   


 


Total Protein   


 


Albumin   


 


Urine Color   


 


Urine Appearance   


 


Urine pH   


 


Ur Specific Gravity   


 


Urine Protein   


 


Urine Glucose (UA)   


 


Urine Ketones   


 


Urine Blood   


 


Urine Nitrite   


 


Urine Bilirubin   


 


Urine Urobilinogen   


 


Ur Leukocyte Esterase   


 


Urine WBC (Auto)   


 


Urine RBC (Auto)   


 


Urine Casts (Auto)   


 


U Pathogenic Cast Auto   


 


U Epithel Cells (Auto)   


 


Urine Crystals (Auto)   


 


Urine Bacteria (Auto)   


 


Urine Yeast (Auto)   


 


Urine Osmolality  410  


 


Ur Random Creatinine    38.0


 


Ur Random Sodium   68 











ASSESSMENT/PLAN:








ATTENDING PHYSICIAN STATEMENT





I saw and evaluated the patient.


I reviewed the resident's note and discussed the case with the resident.


I agree with the resident's findings and plan as documented.








SUBJECTIVE:








OBJECTIVE:








ASSESSMENT AND PLAN:

## 2020-01-10 NOTE — CONSULT
Consult - text type





- Consultation


Consultation Note: 





63 yo M PMH of HTN, HLD, CKD, metastatic prostate Ca,COPD, prior CVA, Hep C 

presents to ED as blood tests done as outpatient suggestive of acute renal 

failure.


Denies any specific complaints











PHYSICAL EXAMINATION





 Vital Signs - 24 hr











  01/09/20 01/10/20 01/10/20





  18:37 00:05 04:00


 


Temperature 98.1 F  


 


Pulse Rate 89  


 


Pulse Rate [  79 





Left Radial]   


 


Respiratory 18 18 





Rate   


 


Blood Pressure 144/61  


 


Blood Pressure  142/84 





[Right Arm]   


 


O2 Sat by Pulse 98 98 94 L





Oximetry (%)   














  01/10/20 01/10/20





  05:30 07:31


 


Temperature  


 


Pulse Rate  


 


Pulse Rate [ 71 





Left Radial]  


 


Respiratory 18 





Rate  


 


Blood Pressure  


 


Blood Pressure 148/74 





[Right Arm]  


 


O2 Sat by Pulse 93 L 95





Oximetry (%)  











Cor: RSR, No murmurs, No gallops


Lungs: Clear to P&A


Abd: Soft, Normal bowel sounds, No organomegaly


Ext:No significant edema


























 Laboratory Results - last 24 hr











  01/09/20 01/09/20 01/09/20





  19:50 19:50 21:20


 


WBC  5.6  


 


RBC  3.19 L  


 


Hgb  9.0 L  


 


Hct  27.8 L  


 


MCV  87.2  


 


MCH  28.2  


 


MCHC  32.3  


 


RDW  15.2  


 


Plt Count  161  


 


MPV  9.3  


 


Absolute Neuts (auto)  3.7  


 


Neutrophils %  66.2  


 


Lymphocytes %  22.9  D  


 


Monocytes %  7.3  


 


Eosinophils %  2.4  


 


Basophils %  1.2  


 


Nucleated RBC %  0  


 


Retic Count   


 


Sodium   139 


 


Potassium   4.7 


 


Chloride   109 H 


 


Carbon Dioxide   26 


 


Anion Gap   5 L 


 


BUN   53.6 H 


 


Creatinine   4.5 H 


 


Est GFR (CKD-EPI)AfAm   15.00 


 


Est GFR (CKD-EPI)NonAf   12.94 


 


Random Glucose   96 


 


Calcium   8.6 


 


Phosphorus   


 


Magnesium   


 


Iron   


 


TIBC   


 


Iron Saturation   


 


Unsaturated IBC   


 


Total Bilirubin   0.3 


 


AST   9 L 


 


ALT   8 L 


 


Alkaline Phosphatase   135 H 


 


Creatine Kinase   67 


 


Troponin I   < 0.02 


 


B-Natriuretic Peptide   7721.1 H 


 


Total Protein   6.8 


 


Albumin   3.4 


 


Vitamin B12   


 


Serum Folate   


 


Urine Color    Yellow


 


Urine Appearance    Turbid


 


Urine pH    8.0


 


Ur Specific Gravity    1.013


 


Urine Protein    2+ H


 


Urine Glucose (UA)    Negative


 


Urine Ketones    Negative


 


Urine Blood    1+ H


 


Urine Nitrite    Positive H


 


Urine Bilirubin    Negative


 


Urine Urobilinogen    0.2


 


Ur Leukocyte Esterase    2+ H


 


Urine WBC (Auto)    265


 


Urine RBC (Auto)    2


 


Urine Casts (Auto)    12


 


U Pathogenic Cast Auto    None seen


 


U Epithel Cells (Auto)    3.0


 


Urine Crystals (Auto)    Moderate


 


Urine Bacteria (Auto)    5827.2


 


Urine Yeast (Auto)    None seen


 


Urine Osmolality   


 


Ur Random Creatinine   


 


Ur Random Sodium   


 


Ur Random Potassium   


 


Ur Random Chloride   














  01/09/20 01/09/20 01/09/20





  21:20 21:20 21:20


 


WBC   


 


RBC   


 


Hgb   


 


Hct   


 


MCV   


 


MCH   


 


MCHC   


 


RDW   


 


Plt Count   


 


MPV   


 


Absolute Neuts (auto)   


 


Neutrophils %   


 


Lymphocytes %   


 


Monocytes %   


 


Eosinophils %   


 


Basophils %   


 


Nucleated RBC %   


 


Retic Count   


 


Sodium   


 


Potassium   


 


Chloride   


 


Carbon Dioxide   


 


Anion Gap   


 


BUN   


 


Creatinine   


 


Est GFR (CKD-EPI)AfAm   


 


Est GFR (CKD-EPI)NonAf   


 


Random Glucose   


 


Calcium   


 


Phosphorus   


 


Magnesium   


 


Iron   


 


TIBC   


 


Iron Saturation   


 


Unsaturated IBC   


 


Total Bilirubin   


 


AST   


 


ALT   


 


Alkaline Phosphatase   


 


Creatine Kinase   


 


Troponin I   


 


B-Natriuretic Peptide   


 


Total Protein   


 


Albumin   


 


Vitamin B12   


 


Serum Folate   


 


Urine Color   


 


Urine Appearance   


 


Urine pH   


 


Ur Specific Gravity   


 


Urine Protein   


 


Urine Glucose (UA)   


 


Urine Ketones   


 


Urine Blood   


 


Urine Nitrite   


 


Urine Bilirubin   


 


Urine Urobilinogen   


 


Ur Leukocyte Esterase   


 


Urine WBC (Auto)   


 


Urine RBC (Auto)   


 


Urine Casts (Auto)   


 


U Pathogenic Cast Auto   


 


U Epithel Cells (Auto)   


 


Urine Crystals (Auto)   


 


Urine Bacteria (Auto)   


 


Urine Yeast (Auto)   


 


Urine Osmolality  410  


 


Ur Random Creatinine    38.0


 


Ur Random Sodium   68 


 


Ur Random Potassium   


 


Ur Random Chloride   














  01/10/20 01/10/20 01/10/20





  05:25 05:25 05:25


 


WBC  6.4  


 


RBC  3.35 L  


 


Hgb  9.4 L  


 


Hct  28.9 L  


 


MCV  86.2  


 


MCH  28.1  


 


MCHC  32.6  


 


RDW  14.9  


 


Plt Count  155  


 


MPV  8.8  


 


Absolute Neuts (auto)  4.8  


 


Neutrophils %  74.8  


 


Lymphocytes %  16.4  D  


 


Monocytes %  5.3  


 


Eosinophils %  2.7  


 


Basophils %  0.8  


 


Nucleated RBC %  0  


 


Retic Count  0.82  


 


Sodium   140 


 


Potassium   4.4 


 


Chloride   110 H 


 


Carbon Dioxide   25 


 


Anion Gap   6 L 


 


BUN   54.1 H 


 


Creatinine   4.5 H 


 


Est GFR (CKD-EPI)AfAm   15.00 


 


Est GFR (CKD-EPI)NonAf   12.94 


 


Random Glucose   126 H 


 


Calcium   9.1 


 


Phosphorus   4.6 


 


Magnesium   2.2 


 


Iron    24 L


 


TIBC    265


 


Iron Saturation    9 L


 


Unsaturated IBC    241


 


Total Bilirubin   0.3 


 


AST   9 L 


 


ALT   8 L 


 


Alkaline Phosphatase   131 H 


 


Creatine Kinase   


 


Troponin I   


 


B-Natriuretic Peptide   


 


Total Protein   6.6 


 


Albumin   3.2 L 


 


Vitamin B12    501


 


Serum Folate   18 H 


 


Urine Color   


 


Urine Appearance   


 


Urine pH   


 


Ur Specific Gravity   


 


Urine Protein   


 


Urine Glucose (UA)   


 


Urine Ketones   


 


Urine Blood   


 


Urine Nitrite   


 


Urine Bilirubin   


 


Urine Urobilinogen   


 


Ur Leukocyte Esterase   


 


Urine WBC (Auto)   


 


Urine RBC (Auto)   


 


Urine Casts (Auto)   


 


U Pathogenic Cast Auto   


 


U Epithel Cells (Auto)   


 


Urine Crystals (Auto)   


 


Urine Bacteria (Auto)   


 


Urine Yeast (Auto)   


 


Urine Osmolality   


 


Ur Random Creatinine   


 


Ur Random Sodium   


 


Ur Random Potassium   


 


Ur Random Chloride   














  01/10/20





  08:00


 


WBC 


 


RBC 


 


Hgb 


 


Hct 


 


MCV 


 


MCH 


 


MCHC 


 


RDW 


 


Plt Count 


 


MPV 


 


Absolute Neuts (auto) 


 


Neutrophils % 


 


Lymphocytes % 


 


Monocytes % 


 


Eosinophils % 


 


Basophils % 


 


Nucleated RBC % 


 


Retic Count 


 


Sodium 


 


Potassium 


 


Chloride 


 


Carbon Dioxide 


 


Anion Gap 


 


BUN 


 


Creatinine 


 


Est GFR (CKD-EPI)AfAm 


 


Est GFR (CKD-EPI)NonAf 


 


Random Glucose 


 


Calcium 


 


Phosphorus 


 


Magnesium 


 


Iron 


 


TIBC 


 


Iron Saturation 


 


Unsaturated IBC 


 


Total Bilirubin 


 


AST 


 


ALT 


 


Alkaline Phosphatase 


 


Creatine Kinase 


 


Troponin I 


 


B-Natriuretic Peptide 


 


Total Protein 


 


Albumin 


 


Vitamin B12 


 


Serum Folate 


 


Urine Color 


 


Urine Appearance 


 


Urine pH 


 


Ur Specific Gravity 


 


Urine Protein 


 


Urine Glucose (UA) 


 


Urine Ketones 


 


Urine Blood 


 


Urine Nitrite 


 


Urine Bilirubin 


 


Urine Urobilinogen 


 


Ur Leukocyte Esterase 


 


Urine WBC (Auto) 


 


Urine RBC (Auto) 


 


Urine Casts (Auto) 


 


U Pathogenic Cast Auto 


 


U Epithel Cells (Auto) 


 


Urine Crystals (Auto) 


 


Urine Bacteria (Auto) 


 


Urine Yeast (Auto) 


 


Urine Osmolality 


 


Ur Random Creatinine 


 


Ur Random Sodium  94


 


Ur Random Potassium  14.0 L


 


Ur Random Chloride  92 L








Active Medications











Generic Name Dose Route Start Last Admin





  Trade Name Freq  PRN Reason Stop Dose Admin


 


Heparin Sodium (Porcine)  5,000 unit  01/10/20 06:00  01/10/20 06:56





  Heparin -  SQ   5,000 unit





  TID JOHN   Administration





     





     





     





     


 


Lactated Ringer's  1,000 ml in 1,000 mls @ 83 mls/hr  01/10/20 03:45  01/10/20 

04:30





  Lactated Ringers Solution  IV   83 mls/hr





  ASDIR JOHN   Administration





     





     





     





     


 


Insulin Aspart  1 vial  01/10/20 07:00  01/10/20 07:28





  Novolog Vial Sliding Scale -  SQ   Not Given





  [facility]   





     





     





  Protocol   





     








 


 


  Abnormal Lab Results











  01/13/20 01/13/20





  11:20 11:20


 


RBC  3.17 L 


 


Hgb  9.0 L 


 


Hct  27.4 L 


 


Plt Count  126 L 


 


Chloride   110 H


 


Anion Gap   6 L


 


BUN   43.4 H


 


Creatinine   4.1 H


 


Random Glucose   213 H











 


  








ASSESSMENT/PLAN:pending discussing with Dr Heck 





63 yo M PMH of HTN, HLD, CKD, metastatic prostate Ca,COPD, prior CVA, Hep C 

presents to ED as blood tests done as outpatient suggestive of acute renal 

failure.


On IV hdration


Nephrology following


will check CT c/a/p noncontrast

## 2020-01-10 NOTE — HP
CHIEF COMPLAINT: instruction from Dr. Heck to return to ED 





PCP: Luis Manuel





HISTORY OF PRESENT ILLNESS:


65 yo M PMH of HTN, HLD, CKD, metastatic prostate Ca,COPD, prior CVA, Hep C 

presents to ED on instruction from Dr. Heck. The pt saw Dr. Maria R Art on 1/2/2020. PT was not willing to answer questions and was not 

cooperative. on communication with his wife, pt sees his primary frequently and 

sees specialists. pt reports that the suprapubic gets changed every month. Pt 

denies complaints 





ER course was notable for:


(1)CBC, CMP


(2)s/p vanc/ zosyn








PAST MEDICAL HISTORY:


see HPI





Social History:


Smoking: (+)


Alcohol: unknown


Drugs: ? crack cocaine 





Allergies





No Known Allergies Allergy (Verified 01/09/20 18:41)


 


HOME MEDICATIONS:


 Home Medications











 Medication  Instructions  Recorded


 


Atenolol [Tenormin -] 25 mg PO DAILY 11/19/18


 


Docusate Sodium [Colace -] 300 mg PO HS #30 capsule 11/19/18


 


Amlodipine Besylate [Norvasc -] 10 mg PO DAILY 10/09/19


 


Atorvastatin Ca [Lipitor] 40 mg PO HS 10/09/19


 


Budesonide/Formeterol Fumarate 1 inh PO BID 10/09/19





[SYMBICORT 160/4.5mcg -]  


 


Glimepiride [Amaryl -] 2 mg PO DAILY 10/09/19


 


Insulin Glargine,Hum.rec.anlog 15 unit SQ HS 10/09/19





[Basaglar Kwikpen U-100]  


 


Methadone [Dolophine -] 60 mg PO DAILY@0600 10/09/19


 


Multivitamins [Multivit ([facility] 1 tab PO DAILY #30 tab 10/09/19





Formulary)]  


 


Tamsulosin HCl [Flomax] 0.4 mg PO DAILY 10/09/19


 


Acetaminophen 500 mg PO QID PRN #120 tablet MDD 4 12/04/19


 


Cyclobenzaprine HCl [Flexeril -] 10 mg PO HS #10 tablet 12/04/19


 


Ergocalciferol (Vitamin D2) 50,000 unit PO Q7D #4 capsule 12/04/19





[Vitamin D2]  


 


Lidocaine 5% Patch [Lidoderm Patch 2 patch TP DAILY #60 patch MDD 2 12/04/19





-]  


 


Pregabalin [Lyrica -] 75 mg PO TID 12/04/19








REVIEW OF SYSTEMS


CONSTITUTIONAL: 


Absent:  fever, chills, diaphoresis, generalized weakness, malaise, loss of 

appetite, weight change


HEENT: 


Absent:  rhinorrhea, nasal congestion, throat pain, throat swelling, difficulty 

swallowing, mouth swelling, ear pain, eye pain, visual changes


CARDIOVASCULAR: 


Absent: chest pain, syncope, palpitations, irregular heart rate, lightheadedness

, peripheral edema


RESPIRATORY: 


Absent: cough, shortness of breath, dyspnea with exertion, orthopnea, wheezing, 

stridor, hemoptysis


GASTROINTESTINAL:


Absent: abdominal pain, abdominal distension, nausea, vomiting, diarrhea, 

constipation, melena, hematochezia


GENITOURINARY: 


Absent: dysuria, frequency, urgency, hesitancy, hematuria, flank pain, genital 

pain


MUSCULOSKELETAL: 


Absent: myalgia, arthralgia, joint swelling, back pain, neck pain


SKIN: 


Absent: rash, itching, pallor


HEMATOLOGIC/IMMUNOLOGIC: 


Absent: easy bleeding, easy bruising, lymphadenopathy, frequent infections


ENDOCRINE:


Absent: unexplained weight gain, unexplained weight loss, heat intolerance, 

cold intolerance


NEUROLOGIC: 


Absent: headache, focal weakness or paresthesias, dizziness, unsteady gait, 

seizure, mental status changes, bladder or bowel incontinence


PSYCHIATRIC: 


Absent: anxiety, depression, suicidal or homicidal ideation, hallucinations.








PHYSICAL EXAMINATION


 Vital Signs - 24 hr











  01/09/20





  18:37


 


Temperature 98.1 F


 


Pulse Rate 89


 


Respiratory 18





Rate 


 


Blood Pressure 144/61


 


O2 Sat by Pulse 98





Oximetry (%) 











GENERAL: Awake, alert, and fully oriented, in no acute distress. pt is very 

agitated. 


HEAD: Normal with no signs of trauma.


EYES: Pupils equal, round and reactive to light, extraocular movements intact


EARS, NOSE, THROAT: nares patent, oropharynx clear without exudates. Moist 

mucous membranes.


NECK: Normal range of motion, supple without lymphadenopathy, JVD, or masses.


LUNGS: Breath sounds equal, decreased at bases. No accessory muscle use.


HEART: Regular rate and rhythm, normal S1 and S2 


ABDOMEN: Soft, nontender, not distended, normoactive bowel sounds, suprapubic 

cath in place, draining well. urine is clear yellow without blood 


MUSCULOSKELETAL: Normal range of motion at all joints. No bony deformities or 

tenderness. 


UPPER EXTREMITIES: 2+ pulses, warm, well-perfused. No cyanosis. No clubbing. No 

peripheral edema.


LOWER EXTREMITIES: 2+ pulses, warm, well-perfused. No calf tenderness. No 

peripheral edema. 





 Laboratory Last Values











WBC  5.6 K/mm3 (4.0-10.0)   01/09/20  19:50    


 


RBC  3.19 M/mm3 (4.00-5.60)  L  01/09/20  19:50    


 


Hgb  9.0 GM/dL (11.7-16.9)  L  01/09/20  19:50    


 


Hct  27.8 % (35.4-49)  L  01/09/20  19:50    


 


MCV  87.2 fl (80-96)   01/09/20  19:50    


 


MCH  28.2 pg (25.7-33.7)   01/09/20  19:50    


 


MCHC  32.3 g/dl (32.0-35.9)   01/09/20  19:50    


 


RDW  15.2 % (11.9-15.9)   01/09/20  19:50    


 


Plt Count  161 K/MM3 (134-434)   01/09/20  19:50    


 


MPV  9.3 fl (7.5-11.1)   01/09/20  19:50    


 


Absolute Neuts (auto)  3.7 K/mm3 (1.5-8.0)   01/09/20  19:50    


 


Neutrophils %  66.2 % (42.8-82.8)   01/09/20  19:50    


 


Lymphocytes %  22.9 % (8-40)  D 01/09/20  19:50    


 


Monocytes %  7.3 % (3.8-10.2)   01/09/20  19:50    


 


Eosinophils %  2.4 % (0-4.5)   01/09/20  19:50    


 


Basophils %  1.2 % (0-2.0)   01/09/20  19:50    


 


Nucleated RBC %  0 % (0-0)   01/09/20  19:50    


 


Sodium  139 mmol/L (136-145)   01/09/20  19:50    


 


Potassium  4.7 mmol/L (3.5-5.1)   01/09/20  19:50    


 


Chloride  109 mmol/L ()  H  01/09/20  19:50    


 


Carbon Dioxide  26 mmol/L (21-32)   01/09/20  19:50    


 


Anion Gap  5 MMOL/L (8-16)  L  01/09/20  19:50    


 


BUN  53.6 mg/dL (7-18)  H  01/09/20  19:50    


 


Creatinine  4.5 mg/dL (0.55-1.3)  H  01/09/20  19:50    


 


Est GFR (CKD-EPI)AfAm  15.00   01/09/20  19:50    


 


Est GFR (CKD-EPI)NonAf  12.94   01/09/20  19:50    


 


Random Glucose  96 mg/dL ()   01/09/20  19:50    


 


Calcium  8.6 mg/dL (8.5-10.1)   01/09/20  19:50    


 


Total Bilirubin  0.3 mg/dL (0.2-1)   01/09/20  19:50    


 


AST  9 U/L (15-37)  L  01/09/20  19:50    


 


ALT  8 U/L (13-61)  L  01/09/20  19:50    


 


Alkaline Phosphatase  135 U/L ()  H  01/09/20  19:50    


 


Creatine Kinase  67 U/L ()   01/09/20  19:50    


 


Troponin I  < 0.02 ng/ml (0.00-0.05)   01/09/20  19:50    


 


B-Natriuretic Peptide  7721.1 pg/ml (5-125)  H  01/09/20  19:50    


 


Total Protein  6.8 g/dl (6.4-8.2)   01/09/20  19:50    


 


Albumin  3.4 g/dl (3.4-5.0)   01/09/20  19:50    


 


Urine Color  Yellow   01/09/20  21:20    


 


Urine Appearance  Turbid   01/09/20  21:20    


 


Urine pH  8.0  (5.0-8.0)   01/09/20  21:20    


 


Ur Specific Gravity  1.013  (1.010-1.035)   01/09/20  21:20    


 


Urine Protein  2+  (NEGATIVE)  H  01/09/20  21:20    


 


Urine Glucose (UA)  Negative  (NEGATIVE)   01/09/20  21:20    


 


Urine Ketones  Negative  (NEGATIVE)   01/09/20  21:20    


 


Urine Blood  1+  (NEGATIVE)  H  01/09/20  21:20    


 


Urine Nitrite  Positive  (NEGATIVE)  H  01/09/20  21:20    


 


Urine Bilirubin  Negative  (NEGATIVE)   01/09/20  21:20    


 


Urine Urobilinogen  0.2 mg/dL (0.2-1.0)   01/09/20  21:20    


 


Ur Leukocyte Esterase  2+  (NEGATIVE)  H  01/09/20  21:20    


 


Urine WBC (Auto)  265 /hpf (0-5)   01/09/20  21:20    


 


Urine RBC (Auto)  2 /hpf (0-4)   01/09/20  21:20    


 


Urine Casts (Auto)  12 /lpf (0-8)   01/09/20  21:20    


 


U Pathogenic Cast Auto  None seen /lpf (NEGATIVE)   01/09/20  21:20    


 


U Epithel Cells (Auto)  3.0 /HPF (0-5/HPF)   01/09/20  21:20    


 


Urine Crystals (Auto)  Moderate /hpf  01/09/20  21:20    


 


Urine Bacteria (Auto)  5827.2 /hpf (NEGATIVE)   01/09/20  21:20    


 


Urine Yeast (Auto)  None seen  (NEGATIVE)   01/09/20  21:20    


 


Urine Osmolality  410 mosm/kg (300-900)   01/09/20  21:20    


 


Ur Random Creatinine  38.0 mg/dL ()   01/09/20  21:20    


 


Ur Random Sodium  68 MMOL/L ()   01/09/20  21:20    








ASSESSMENT/PLAN:


65 yo M PMH of HTN, DM, HLD, CKD, metastatic prostate Ca,COPD, prior CVA, Hep C 

presents to for elevated Cr. Pt is admitted to medicine for Acute renal failure





Acute Renal Failure


- Cr 4.5, 5 on 1/2/2020


-UA reviewed


-pending renal u/s 


- nephro recs appreciated 


- daily weights


-I/Os


- Urology recs appreciated ( Dr. Vinny Tolentino ) for suprapubic catheter evaluation 


-s/p vanc zosyn in ED 





Anemia possibly 2/2 CKD 


- Hgb 9, was 9 on 1/2/2020


- pending anemia panel 


- continue to monitor 


- Heme/ onc recs appreciated ( Dr. Maria R Sousa) 





HTN


- continue home medications





HLD 


- continue home medications





DM


- ISS, BGM





Acute HF 


- BNP> 7000


- pending Echo 


- consider cardio consultation 





COPD


-stable 


-consider outpt PFTs





F/E/N


- monitor lytes


- sodium controlled diet 


- IV LR    





DVT ppx: heparin


Dispo: Admit to medicine    





Visit type





- Emergency Visit


Emergency Visit: Yes


ED Registration Date: 01/09/20


Care time: The patient presented to the Emergency Department on the above date 

and was hospitalized for further evaluation of their emergent condition.





- New Patient


This patient is new to me today: Yes





- Critical Care


Critical Care patient: No





ATTENDING PHYSICIAN STATEMENT





I saw and evaluated the patient.


I reviewed the resident's note and discussed the case with the resident.


I agree with the resident's findings and plan as documented.








SUBJECTIVE:








OBJECTIVE:








ASSESSMENT AND PLAN:

## 2020-01-10 NOTE — CONSULT
Consultation: 


REQUESTING PROVIDER:primmary team 





CONSULT REQUEST: We have been asked to medically evaluate this patient for (

anemia , prostate cancer with mets ).





HISTORY OF PRESENT ILLNESS:


65 yo M PMH of HTN, HLD, CKD, metastatic prostate Ca,COPD, prior CVA, Hep C 

presents to ED on instruction from Dr. Heck. The pt saw Dr. Maria R Art on 1/2/2020. PT was not willing to answer questions and was not 

cooperative. on communication with his wife, pt sees his primary frequently and 

sees specialists. pt reports that the suprapubic gets changed every month. Pt 

denies complaints 


denies any fever , chills, N/V/D?C 


denies any complain except left hand finger deformity 


asking to go home . 








REVIEW OF SYSTEMS:


CONSTITUTIONAL: 


Absent:  fever, chills, diaphoresis, generalized weakness, malaise, loss of 

appetite, weight change


HEENT: 


Absent:  rhinorrhea, nasal congestion, throat pain, throat swelling, difficulty 

swallowing, mouth swelling, ear pain, eye pain, visual changes


CARDIOVASCULAR: 


Absent: chest pain, syncope, palpitations, irregular heart rate, lightheadedness

, peripheral edema


RESPIRATORY: 


Absent: cough, shortness of breath, dyspnea with exertion, orthopnea, wheezing, 

stridor, hemoptysis


GASTROINTESTINAL:


Absent: abdominal pain, abdominal distension, nausea, vomiting, diarrhea, 

constipation, melena, hematochezia


GENITOURINARY: 


Absent: dysuria, frequency, urgency, hesitancy, hematuria, flank pain, genital 

pain


MUSCULOSKELETAL: 


Absent: myalgia, arthralgia, joint swelling, back pain, neck pain


SKIN: 


Absent: rash, itching, pallor


HEMATOLOGIC/IMMUNOLOGIC: 


Absent: easy bleeding, easy bruising, lymphadenopathy, frequent infections


ENDOCRINE:


Absent: unexplained weight gain, unexplained weight loss, heat intolerance, 

cold intolerance


NEUROLOGIC: 


Absent: headache, focal weakness or paresthesias, dizziness, unsteady gait, 

seizure, mental status changes, bladder or bowel incontinence


PSYCHIATRIC: 


Absent: anxiety, depression, suicidal or homicidal ideation, hallucinations.





PHYSICAL EXAMINATION





 Vital Signs - 24 hr











  01/09/20 01/10/20 01/10/20





  18:37 00:05 04:00


 


Temperature 98.1 F  


 


Pulse Rate 89  


 


Pulse Rate [  79 





Left Radial]   


 


Respiratory 18 18 





Rate   


 


Blood Pressure 144/61  


 


Blood Pressure  142/84 





[Right Arm]   


 


O2 Sat by Pulse 98 98 94 L





Oximetry (%)   














  01/10/20 01/10/20





  05:30 07:31


 


Temperature  


 


Pulse Rate  


 


Pulse Rate [ 71 





Left Radial]  


 


Respiratory 18 





Rate  


 


Blood Pressure  


 


Blood Pressure 148/74 





[Right Arm]  


 


O2 Sat by Pulse 93 L 95





Oximetry (%)  














GENERAL: Awake, alert, in NAD 


HEAD: NC/AT 


EYES: Pupils equal, round and reactive to light, extraocular movements intact,


EARS, NOSE, THROAT: Ears normal, nares patent, oropharynx clear without 

exudates. Moist mucous membranes.


NECK: Normal range of motion, supple without lymphadenopathy, JVD, or masses.


LUNGS: Breath sounds equal, clear to auscultation bilaterally. No wheezes, and 

no crackles. No accessory muscle use.


HEART: Regular rate and rhythm, normal S1 and S2 without murmur, rub or gallop.


ABDOMEN: Soft, nontender, not distended, normoactive bowel sounds, right side 

surgical scarm suprapubic catheter 





LOWER EXTREMITIES: 2+ pulses, warm, well-perfused. No calf tenderness. No 

peripheral edema. 


NEUROLOGICAL:  no focal deficit ,Normal speech.


PSYCHIATRIC: Cooperative. Good eye contact. 


SKIN: Warm, dry, normal turgor


no breast mass palpated 


no nodule palapated 











 Laboratory Results - last 24 hr











  01/09/20 01/09/20 01/09/20





  19:50 19:50 21:20


 


WBC  5.6  


 


RBC  3.19 L  


 


Hgb  9.0 L  


 


Hct  27.8 L  


 


MCV  87.2  


 


MCH  28.2  


 


MCHC  32.3  


 


RDW  15.2  


 


Plt Count  161  


 


MPV  9.3  


 


Absolute Neuts (auto)  3.7  


 


Neutrophils %  66.2  


 


Lymphocytes %  22.9  D  


 


Monocytes %  7.3  


 


Eosinophils %  2.4  


 


Basophils %  1.2  


 


Nucleated RBC %  0  


 


Retic Count   


 


Sodium   139 


 


Potassium   4.7 


 


Chloride   109 H 


 


Carbon Dioxide   26 


 


Anion Gap   5 L 


 


BUN   53.6 H 


 


Creatinine   4.5 H 


 


Est GFR (CKD-EPI)AfAm   15.00 


 


Est GFR (CKD-EPI)NonAf   12.94 


 


Random Glucose   96 


 


Calcium   8.6 


 


Phosphorus   


 


Magnesium   


 


Iron   


 


TIBC   


 


Iron Saturation   


 


Unsaturated IBC   


 


Total Bilirubin   0.3 


 


AST   9 L 


 


ALT   8 L 


 


Alkaline Phosphatase   135 H 


 


Creatine Kinase   67 


 


Troponin I   < 0.02 


 


B-Natriuretic Peptide   7721.1 H 


 


Total Protein   6.8 


 


Albumin   3.4 


 


Vitamin B12   


 


Serum Folate   


 


Urine Color    Yellow


 


Urine Appearance    Turbid


 


Urine pH    8.0


 


Ur Specific Gravity    1.013


 


Urine Protein    2+ H


 


Urine Glucose (UA)    Negative


 


Urine Ketones    Negative


 


Urine Blood    1+ H


 


Urine Nitrite    Positive H


 


Urine Bilirubin    Negative


 


Urine Urobilinogen    0.2


 


Ur Leukocyte Esterase    2+ H


 


Urine WBC (Auto)    265


 


Urine RBC (Auto)    2


 


Urine Casts (Auto)    12


 


U Pathogenic Cast Auto    None seen


 


U Epithel Cells (Auto)    3.0


 


Urine Crystals (Auto)    Moderate


 


Urine Bacteria (Auto)    5827.2


 


Urine Yeast (Auto)    None seen


 


Urine Osmolality   


 


Ur Random Creatinine   


 


Ur Random Sodium   


 


Ur Random Potassium   


 


Ur Random Chloride   














  01/09/20 01/09/20 01/09/20





  21:20 21:20 21:20


 


WBC   


 


RBC   


 


Hgb   


 


Hct   


 


MCV   


 


MCH   


 


MCHC   


 


RDW   


 


Plt Count   


 


MPV   


 


Absolute Neuts (auto)   


 


Neutrophils %   


 


Lymphocytes %   


 


Monocytes %   


 


Eosinophils %   


 


Basophils %   


 


Nucleated RBC %   


 


Retic Count   


 


Sodium   


 


Potassium   


 


Chloride   


 


Carbon Dioxide   


 


Anion Gap   


 


BUN   


 


Creatinine   


 


Est GFR (CKD-EPI)AfAm   


 


Est GFR (CKD-EPI)NonAf   


 


Random Glucose   


 


Calcium   


 


Phosphorus   


 


Magnesium   


 


Iron   


 


TIBC   


 


Iron Saturation   


 


Unsaturated IBC   


 


Total Bilirubin   


 


AST   


 


ALT   


 


Alkaline Phosphatase   


 


Creatine Kinase   


 


Troponin I   


 


B-Natriuretic Peptide   


 


Total Protein   


 


Albumin   


 


Vitamin B12   


 


Serum Folate   


 


Urine Color   


 


Urine Appearance   


 


Urine pH   


 


Ur Specific Gravity   


 


Urine Protein   


 


Urine Glucose (UA)   


 


Urine Ketones   


 


Urine Blood   


 


Urine Nitrite   


 


Urine Bilirubin   


 


Urine Urobilinogen   


 


Ur Leukocyte Esterase   


 


Urine WBC (Auto)   


 


Urine RBC (Auto)   


 


Urine Casts (Auto)   


 


U Pathogenic Cast Auto   


 


U Epithel Cells (Auto)   


 


Urine Crystals (Auto)   


 


Urine Bacteria (Auto)   


 


Urine Yeast (Auto)   


 


Urine Osmolality  410  


 


Ur Random Creatinine    38.0


 


Ur Random Sodium   68 


 


Ur Random Potassium   


 


Ur Random Chloride   














  01/10/20 01/10/20 01/10/20





  05:25 05:25 05:25


 


WBC  6.4  


 


RBC  3.35 L  


 


Hgb  9.4 L  


 


Hct  28.9 L  


 


MCV  86.2  


 


MCH  28.1  


 


MCHC  32.6  


 


RDW  14.9  


 


Plt Count  155  


 


MPV  8.8  


 


Absolute Neuts (auto)  4.8  


 


Neutrophils %  74.8  


 


Lymphocytes %  16.4  D  


 


Monocytes %  5.3  


 


Eosinophils %  2.7  


 


Basophils %  0.8  


 


Nucleated RBC %  0  


 


Retic Count  0.82  


 


Sodium   140 


 


Potassium   4.4 


 


Chloride   110 H 


 


Carbon Dioxide   25 


 


Anion Gap   6 L 


 


BUN   54.1 H 


 


Creatinine   4.5 H 


 


Est GFR (CKD-EPI)AfAm   15.00 


 


Est GFR (CKD-EPI)NonAf   12.94 


 


Random Glucose   126 H 


 


Calcium   9.1 


 


Phosphorus   4.6 


 


Magnesium   2.2 


 


Iron    24 L


 


TIBC    265


 


Iron Saturation    9 L


 


Unsaturated IBC    241


 


Total Bilirubin   0.3 


 


AST   9 L 


 


ALT   8 L 


 


Alkaline Phosphatase   131 H 


 


Creatine Kinase   


 


Troponin I   


 


B-Natriuretic Peptide   


 


Total Protein   6.6 


 


Albumin   3.2 L 


 


Vitamin B12    501


 


Serum Folate   18 H 


 


Urine Color   


 


Urine Appearance   


 


Urine pH   


 


Ur Specific Gravity   


 


Urine Protein   


 


Urine Glucose (UA)   


 


Urine Ketones   


 


Urine Blood   


 


Urine Nitrite   


 


Urine Bilirubin   


 


Urine Urobilinogen   


 


Ur Leukocyte Esterase   


 


Urine WBC (Auto)   


 


Urine RBC (Auto)   


 


Urine Casts (Auto)   


 


U Pathogenic Cast Auto   


 


U Epithel Cells (Auto)   


 


Urine Crystals (Auto)   


 


Urine Bacteria (Auto)   


 


Urine Yeast (Auto)   


 


Urine Osmolality   


 


Ur Random Creatinine   


 


Ur Random Sodium   


 


Ur Random Potassium   


 


Ur Random Chloride   














  01/10/20





  08:00


 


WBC 


 


RBC 


 


Hgb 


 


Hct 


 


MCV 


 


MCH 


 


MCHC 


 


RDW 


 


Plt Count 


 


MPV 


 


Absolute Neuts (auto) 


 


Neutrophils % 


 


Lymphocytes % 


 


Monocytes % 


 


Eosinophils % 


 


Basophils % 


 


Nucleated RBC % 


 


Retic Count 


 


Sodium 


 


Potassium 


 


Chloride 


 


Carbon Dioxide 


 


Anion Gap 


 


BUN 


 


Creatinine 


 


Est GFR (CKD-EPI)AfAm 


 


Est GFR (CKD-EPI)NonAf 


 


Random Glucose 


 


Calcium 


 


Phosphorus 


 


Magnesium 


 


Iron 


 


TIBC 


 


Iron Saturation 


 


Unsaturated IBC 


 


Total Bilirubin 


 


AST 


 


ALT 


 


Alkaline Phosphatase 


 


Creatine Kinase 


 


Troponin I 


 


B-Natriuretic Peptide 


 


Total Protein 


 


Albumin 


 


Vitamin B12 


 


Serum Folate 


 


Urine Color 


 


Urine Appearance 


 


Urine pH 


 


Ur Specific Gravity 


 


Urine Protein 


 


Urine Glucose (UA) 


 


Urine Ketones 


 


Urine Blood 


 


Urine Nitrite 


 


Urine Bilirubin 


 


Urine Urobilinogen 


 


Ur Leukocyte Esterase 


 


Urine WBC (Auto) 


 


Urine RBC (Auto) 


 


Urine Casts (Auto) 


 


U Pathogenic Cast Auto 


 


U Epithel Cells (Auto) 


 


Urine Crystals (Auto) 


 


Urine Bacteria (Auto) 


 


Urine Yeast (Auto) 


 


Urine Osmolality 


 


Ur Random Creatinine 


 


Ur Random Sodium  94


 


Ur Random Potassium  14.0 L


 


Ur Random Chloride  92 L








Active Medications











Generic Name Dose Route Start Last Admin





  Trade Name Freq  PRN Reason Stop Dose Admin


 


Heparin Sodium (Porcine)  5,000 unit  01/10/20 06:00  01/10/20 06:56





  Heparin -  SQ   5,000 unit





  TID JOHN   Administration





     





     





     





     


 


Lactated Ringer's  1,000 ml in 1,000 mls @ 83 mls/hr  01/10/20 03:45  01/10/20 

04:30





  Lactated Ringers Solution  IV   83 mls/hr





  ASDIR JOHN   Administration





     





     





     





     


 


Insulin Aspart  1 vial  01/10/20 07:00  01/10/20 07:28





  Novolog Vial Sliding Scale -  SQ   Not Given





  ACHS JOHN   





     





     





  Protocol   





     








 CBC, BMP





 01/10/20 05:25 





 01/10/20 05:25 








ASSESSMENT/PLAN:pending discussing with Dr Heck 


64 year old male with pmhx of prostate cancer with mets send to ED by Dr Heck 

due to elevated BUN/Cr 





we were consulted for anemia and Prostate cancer with mets 





# Normocytic normochromic anemia likely due to CKD can not R.O GI bleed 


* iron panel low but pending ferritin 


* B12 and FA normal 


* TSH 


* monitor cbc daily 


* occult blood stool 


* maintain HGb > 8 


* nephrology consult 


Prostate cancer send PSA , 


Dispo: We will continue to follow the patient. Thank you for this consultative 

opportunity.











Visit type





- Emergency Visit


Emergency Visit: Yes


ED Registration Date: 01/09/20


Care time: The patient presented to the Emergency Department on the above date 

and was hospitalized for further evaluation of their emergent condition.





- New Patient


This patient is new to me today: Yes


Date on this admission: 01/09/20





- Critical Care


Critical Care patient: No





ATTENDING PHYSICIAN STATEMENT





I saw and evaluated the patient.


I reviewed the resident's note and discussed the case with the resident.


I agree with the resident's findings and plan as documented.








SUBJECTIVE:








OBJECTIVE:








ASSESSMENT AND PLAN:

## 2020-01-10 NOTE — EKG
Test Reason : 

Blood Pressure : ***/*** mmHG

Vent. Rate : 076 BPM     Atrial Rate : 076 BPM

   P-R Int : 156 ms          QRS Dur : 078 ms

    QT Int : 390 ms       P-R-T Axes : 022 066 031 degrees

   QTc Int : 438 ms

 

NORMAL SINUS RHYTHM

CANNOT RULE OUT SEPTAL INFARCT , AGE UNDETERMINED

ABNORMAL ECG

 

Confirmed by NEPTALI YOUSSEF MD ([id number]) on 1/10/2020 1:47:16 PM

 

Referred By:             Confirmed By:NEPTALI YOUSSEF MD

## 2020-01-10 NOTE — PN
Teaching Attending Note


Name of Resident: Abbie Banda





ATTENDING PHYSICIAN STATEMENT





I saw and evaluated the patient.


I reviewed the resident's note and discussed the case with the resident.


I agree with the resident's findings and plan as documented.








SUBJECTIVE:


 64-year-old man with HTN, HLD , CKD, Prostate cancer w/ urinary retention with 

a suprapubic catheter and opiate use disorder who was sent into emergency room 

by Dr. Heck for elevated creatinine which was found on routine blood work 

from 1/2/2019.  Patient is otherwise asymptomatic and denies any shortness of 

breath, headache, dizziness, fever or chills. He denied any changes in his 

urinary habits recently.





OBJECTIVE:


 Last Vital Signs











Temp Pulse Resp BP Pulse Ox


 


 98.1 F   89   18   144/61   98 


 


 01/09/20 18:37  01/09/20 18:37  01/09/20 18:37  01/09/20 18:37  01/09/20 18:37





GENERAL:


Well developed, well nourished. Awake and alert. No acute distress.


HEENT:


Normocephalic, atraumatic. PERRLA, EOMI. No conjunctival pallor. Sclera are non-

icteric. Moist mucous membranes. Oropharynx is clear.


NECK: 


Supple. Full ROM. No JVD. Carotid pulses 2+ and symmetric, without bruits. No 

thyromegaly. No lymphadenopathy.


CARDIOVASCULAR:


Regular rate and rhythm. No murmurs, rubs, or gallops. Distal pulses are 2+ and 

symmetric. 


PULMONARY: 


No evidence of respiratory distress. Lungs clear to auscultation bilaterally. 

No wheezing, rales or rhonchi.


ABDOMINAL:


Soft. Non-tender. Non-distended. No rebound or guarding. No organomegaly. 

Normoactive bowel sounds. 


Suprapubic catheter in place


MUSCULOSKELETAL 


Normal range of motion at all joints. No bony deformities or tenderness. No CVA 

tenderness.


EXTREMITIES: 


No cyanosis. No clubbing. No edema. No calf tenderness.


SKIN: 


Warm and dry. Normal capillary refill. No rashes. No jaundice. 


PSYCHIATRIC: 


Cooperative. Good eye contact. Appropriate mood and affect.


 Abnormal Lab Results











  01/09/20 01/09/20 01/09/20





  19:50 19:50 21:20


 


RBC  3.19 L  


 


Hgb  9.0 L  


 


Hct  27.8 L  


 


Chloride   109 H 


 


Anion Gap   5 L 


 


BUN   53.6 H 


 


Creatinine   4.5 H 


 


AST   9 L 


 


ALT   8 L 


 


Alkaline Phosphatase   135 H 


 


B-Natriuretic Peptide   7721.1 H 


 


Urine Protein    2+ H


 


Urine Blood    1+ H


 


Urine Nitrite    Positive H


 


Ur Leukocyte Esterase    2+ H








Imaging studies reviewed





ASSESSMENT AND PLAN:


64-year-old man with history of prostate cancer with acute kidney injury found 

on routine blood work


Admit to [facility]


IV fluid hydration


Renal sonogram


I's and O's


Daily weights


Avoid nephrotoxins


Renal, urology, oncology evaluation


Consider to replace suprapubic catheter with new catheter


#Normocytic anemia


#DVT prophylaxisheparin subcutaneously

## 2020-01-10 NOTE — ECHO
______________________________________________________________________________



Name: CARRIE FLEMING                                  Exam:Adult Echocardiogram

MRN: [id number]         Study Date: 01/10/2020 08:44 AM

Age: 64 yrs

______________________________________________________________________________



Reason For Study: hf

Height: 64 in        Weight: 140 lb        BSA: 1.7 m2



______________________________________________________________________________



MMode/2D Measurements & Calculations

IVSd: 1.1 cm                                            Ao root diam: 3.3 cm

LVIDd: 5.0 cm                                           LA dimension: 4.1 cm

LVIDs: 3.8 cm

LVPWd: 1.0 cm



_________________________________________________________

EDV(Teich): 118.5 ml                                    LVOT diam: 2.0 cm

ESV(Teich): 61.7 ml



_________________________________________________________

LAV (MOD-bp): 90.4 ml



Doppler Measurements & Calculations

MV E max sebastián: 90.2 cm/sec                                 Ao V2 max: 173.7 cm/sec

MV A max sebastián: 77.6 cm/sec                                 Ao max P.1 mmHg

MV E/A: 1.2

MV dec time: 0.25 sec                                     LORAINE(V,D): 1.8 cm2



___________________________________________________________

LV V1 max PG: 3.7 mmHg                                    MR max sebastián: 470.1 cm/sec

LV V1 max: 96.0 cm/sec                                    MR max P.1 mmHg



___________________________________________________________

TR max sebastián: 317.6 cm/sec                                  PA V2 max: 97.7 cm/sec

TR max P.4 mmHg                                      PA max PG: 3.8 mmHg

___________________________________________________________



Med Peak E' Sebastián: 6.2 cm/sec                               PI Vmax: 127.6 cm/sec

Med E/e': 14.5

Lat Peak E' Sebastián: 9.4 cm/sec

Lat E/e': 9.6



______________________________________________________________________________



Left Ventricle

Left ventricular systolic function is mildly reduced. Ejection Fraction = 45-50%. There is mild to mo
derate

lateral wall hypokinesis.

Right Ventricle

The right ventricle is normal in size and function.

Atria

The left atrium is mildly dilated. Right atrial size is normal.

Mitral Valve

The mitral valve is normal in structure and function. There is no mitral valve stenosis. There is mil
d mitral

regurgitation.

Tricuspid Valve

The tricuspid valve is normal in structure and function. There is mild tricuspid regurgitation.

Aortic Valve

There is mild aortic sclerosis.;. No hemodynamically significant valvular aortic stenosis. No aortic

regurgitation is present.

Pulmonic Valve

The pulmonic valve is not well seen, but is grossly normal. There is no pulmonic valvular stenosis. M
ild

pulmonic valvular regurgitation.

Great Vessels

The aortic root is normal size.

Pericardium/Pleura

There is no pericardial effusion.

______________________________________________________________________________





Interpretation Summary

There is mild to moderate lateral wall hypokinesis.

Left ventricular systolic function is mildly reduced.

Ejection Fraction = 45-50%.

The right ventricle is normal in size and function.

The left atrium is mildly dilated.

There is mild mitral regurgitation.

There is mild tricuspid regurgitation.

There is mild aortic sclerosis.;

There is no pericardial effusion.





MD Menchaca *Pradeep 01/10/2020 11:02 AM

## 2020-01-10 NOTE — PN
Progress Note (short form)





- Note


Progress Note: 


Hospitalist Medicine


Resting in bed. States that he is feeling fine. "I just came in because my lab 

was abnormal." No complaint. States has suprapubic cath b/c of incontinence. 

Meds have been verified w/ pharmacy. 





 Vitals











  01/10/20





  11:42


 


Temperature 98.3 F


 


Pulse Rate [ 78





Left Radial] 


 


Respiratory 18





Rate 


 


Blood Pressure 164/81





[Right Arm] 


 


O2 Sat by Pulse 97





Oximetry (%) 





Physical Exam


general: resting in bed, in NAD


HEENT: NCAT, PERRLA


neck: supple


cardio: S1, S2 RRR


pullm: CTA b/l. 


abdomen: nontender, nondistended


LE: 2+ pulses, no edema


: +suprapubic cath w/ leg bag


 Laboratory Tests











  01/10/20 01/10/20 01/10/20





  05:25 05:25 05:25


 


WBC  6.4  


 


Hgb  9.4 L  


 


Hct  28.9 L  


 


Plt Count  155  


 


Sodium   140 


 


Potassium   4.4 


 


Chloride   110 H 


 


Carbon Dioxide   25 


 


Anion Gap   6 L 


 


BUN   54.1 H 


 


Creatinine   4.5 H 


 


Random Glucose   126 H 


 


AST   9 L 


 


ALT   8 L 


 


Alkaline Phosphatase   131 H 


 


Total Protein   6.6 


 


Albumin   3.2 L 


 


Vitamin B12    501


 


Serum Folate   18 H 

















  01/09/20





  21:20


 


Urine Color  Yellow


 


Urine Appearance  Turbid


 


Urine pH  8.0


 


Ur Specific Gravity  1.013


 


Urine Protein  2+ H


 


Urine Glucose (UA)  Negative


 


Urine Ketones  Negative


 


Urine Blood  1+ H


 


Urine Nitrite  Positive H


 


Urine Bilirubin  Negative


 


Urine Urobilinogen  0.2


 


Ur Leukocyte Esterase  2+ H


 


Urine WBC (Auto)  265


 


Urine RBC (Auto)  2


 


Urine Casts (Auto)  12








Micro: none





Imaging





1/10/20: Renal sono: nonobstructing L real lower pole stone measuring 7x4mm 

with mild fullness of the left renal pelvis. correlate CT for further 

evaluation. small R renal upper pole simple cyst measuring 1cm. incidentally 

noted multiple small gallstones layering posteriorly for which further 

evaluation with gallbladder ultrasound is needed.





1/10/20: ECHO: mild to moderate lateral wall hypokinesis. EF 45-50%. LVSF 

mildly reduced. mild MR, mild TR, mild AS, no pericardial effusion.





Assessment/Plan


65 yo M PMH of HTN, HLD, CKD, metastatic prostate Ca, COPD, prior CVA, Hep C 

who presented to the ED on instruction from Dr. Heck for elevated creatinine, 

acute renal failure.





#Acute renal failure


-has hx of CKD, non-compliance. smoking


-without acute sign of obstruction on renal sono, however per FEna 8% post-renal

, obstructive


 may be 2/2 prostate CA 


-has suprapubic cath, may need to be exchanged. has not been exchanged yet


-follow UO


-started on 1/2  cc/hr


-f/u repeat CMP tomorrow


-no urgent need for dialysis


-Renal: Dr. Arango


-Uro: Dr. MIGUELITO Jimenes, follows pt 





#normocytic normochromic anemia posible 2/2 CKD


-also need to r/o GIB


-f/u iron panel, ferritin


-f/u stool occult


-will start on protonix


-Heme/Onc: Dr. De La Cruz





#Elevated BNP


-may be 2/2 new onset CHF, or 2/2 ARF


-Cardio: Dr. Bolton. has seen previously


-ECHO noted above. w/ mild to moderate lateral wall hypokinesis





#HTN


-c/w amlodipine





#HLD


-c/w lipitor





#hx COPD


-c/w duonebs RQID, ventolin PRN


-currently not in exacerbation


-c/t smoke. counseled





#?mood d/o


-c/w effexor. qtc WNL





#DM2


-hold home agents


-c/w ISS, BGM ACHS





#F/E/N


1/2  cc/hr


continue to follow lytes


diabetic/ sodium controlled diet





#PPX


DVT: hep sq TID


GI: protonix





#Dispo


monitoring on med-surg























<Lora Whittaker - Last Filed: 01/10/20 17:59>





- Note


Progress Note: 





Seen and examined.  Please see resident note for further historical 

information.  I personally verified all the key historical formation and exam 

findings.  Personally in pretted  imaging and diagnostics and reviewed 

appropriate results.  I reviewed all labs and vital signs are per the resident 

note and EMR as documented.  I agree with the above assessment and plan unless 

supplemented by myself and the following.











<Kwan Schuler - Last Filed: 01/10/20 18:01>

## 2020-01-10 NOTE — CONSULT
Consult


Consult Specialty:: Nephrology


Reason for Consultation:: ROJELIO





- History of Present Illness


Chief Complaint: sent in for worsening crt


History of Present Illness: 





Pt is a 64 year old male with pmhx of urinary retention, htn, ckd, hld, hep c, 

metastaic prostate cance and uti who was sent in for worsening crt. He is known 

to me from previous visits. He has poor outpt follow up. He denies shortness of 

breath or edema. He denies fevers or chills. He denies nsaid use. He is a poor 

historian and has been uncooperative with the staff. He denies shortness of 

breath. 





- History Source


History Provided By: Patient





- Past Medical History


CNS: Yes: CVA


Cardio/Vascular: Yes: HTN


Pulmonary: Yes: COPD


Hepatobiliary: Yes: Hepatitis C


Renal/: Yes: Renal Inusuff, Other (suprapubic cath)


Infectious Disease: Yes: Other (hep C)


Psych: Yes: Addictions


Musculoskeletal: Yes: Chronic low back pain


Endocrine: Yes: Diabetes Mellitus





- Alcohol/Substance Use


Hx Alcohol Use: No


History of Substance Use: reports: Cocaine, Heroin, Marijuana





- Smoking History


Smoking history: Never smoked


Have you smoked in the past 12 months: Yes


Aproximately how many cigarettes per day: 10





- Social History


Usual Living Arrangement: Alone


Occupation: not working





Home Medications





- Allergies


Allergies/Adverse Reactions: 


 Allergies











Allergy/AdvReac Type Severity Reaction Status Date / Time


 


No Known Allergies Allergy   Verified 01/09/20 18:41














- Home Medications


Home Medications: 


Ambulatory Orders





Atenolol [Tenormin -] 25 mg PO DAILY 11/19/18 


RX: Docusate Sodium [Colace -] 300 mg PO HS #30 capsule 11/19/18 


Amlodipine Besylate [Norvasc -] 10 mg PO DAILY 10/09/19 


Atorvastatin Ca [Lipitor] 40 mg PO HS 10/09/19 


Budesonide/Formeterol Fumarate [SYMBICORT 160/4.5mcg -] 1 inh PO BID 10/09/19 


Glimepiride [Amaryl -] 2 mg PO DAILY 10/09/19 


Insulin Glargine,Hum.rec.anlog [Basaglar Kwikpen U-100] 15 unit SQ HS 10/09/19 


RX: Methadone [Dolophine -] 60 mg PO DAILY@0600 10/09/19 


RX: Multivitamins [Multivit ([facility])] 1 tab PO DAILY #30 tab 10/09/19 


Tamsulosin HCl [Flomax] 0.4 mg PO DAILY 10/09/19 


Cyclobenzaprine HCl [Flexeril -] 10 mg PO HS #10 tablet 12/04/19 


Ergocalciferol (Vitamin D2) [Vitamin D2] 50,000 unit PO Q7D #4 capsule 12/04/19 


Lidocaine 5% Patch [Lidoderm Patch -] 2 patch TP DAILY #60 patch MDD 2 12/04/19 


Pregabalin [Lyrica -] 75 mg PO TID 12/04/19 


RX: Acetaminophen 500 mg PO QID PRN #120 tablet MDD 4 12/04/19 











Family Medical History


Family History: Denies





Review of Systems





- Review of Systems


Constitutional: reports: Malaise


Eyes: reports: No Symptoms


HENT: reports: No Symptoms


Neck: reports: No Symptoms


Cardiovascular: reports: No Symptoms


Respiratory: reports: No Symptoms


Gastrointestinal: reports: No Symptoms


Genitourinary: reports: No Symptoms


Musculoskeletal: reports: No Symptoms


Integumentary: reports: No Symptoms


Hematology/Lymphatic: reports: No Symptoms


Psychiatric: reports: No Symptoms





Physical Exam


Vital Signs: 


 Vital Signs











Temperature  98.1 F   01/09/20 18:37


 


Pulse Rate  71   01/10/20 05:30


 


Respiratory Rate  18   01/10/20 05:30


 


Blood Pressure  148/74   01/10/20 05:30


 


O2 Sat by Pulse Oximetry (%)  95   01/10/20 07:31











Constitutional: Yes: Anxious


Eyes: Yes: Conjunctiva Clear


HENT: Yes: Atraumatic


Neck: Yes: Supple


Cardiovascular: Yes: S1, S2


Respiratory: Yes: CTA Bilaterally


Gastrointestinal: Yes: Soft


Renal/: Yes: Other (suprapubic)


Musculoskeletal: Yes: WNL


Edema: No


Neurological: Yes: Oriented


Psychiatric: Yes: Oriented


Labs: 


 CBC, BMP





 01/10/20 05:25 





 01/10/20 05:25 





 Laboratory Tests











  11/21/18 02/15/19 01/02/20





  10:18 10:25 15:30


 


Creatinine  2.2 H  1.7 H  5.0 H














  01/09/20 01/10/20





  19:50 05:25


 


Creatinine  4.5 H  4.5 H














Imaging





- Results


Cat Scan: Report Reviewed





Problem List





- Problems


(1) Acute kidney failure


Code(s): N17.9 - ACUTE KIDNEY FAILURE, UNSPECIFIED   


Qualifiers: 


   Acute renal failure type: unspecified   Qualified Code(s): N17.9 - Acute 

kidney failure, unspecified   





Assessment/Plan





 Current Medications











Generic Name Dose Route Start Last Admin





  Trade Name Freq  PRN Reason Stop Dose Admin


 


Heparin Sodium (Porcine)  5,000 unit  01/10/20 06:00  01/10/20 06:56





  Heparin -  SQ   5,000 unit





  TID JOHN   Administration





     





     





     





     


 


Lactated Ringer's  1,000 ml in 1,000 mls @ 83 mls/hr  01/10/20 03:45  01/10/20 

04:30





  Lactated Ringers Solution  IV   83 mls/hr





  ASDIR JOHN   Administration





     





     





     





     


 


Insulin Aspart  1 vial  01/10/20 07:00  01/10/20 11:04





  Novolog Vial Sliding Scale -  SQ   Not Given





  ACHS JOHN   





     





     





  Protocol   





     














Impression


1. ROJELIO


2. hx CKD


3. DM


4. non compliance


5. narcotic dependence


6. insomnia


7. hyperlipidemia


8. urinary retention s/p suprapubic cath


9. hx anemia


10 Hep C


11. nephrolithiasis


12. prostate ca with mets





Plan


- start fluids


- repeat labs in am


- monitor urine output


- will monitor response to fluids


- no indication for urgent hd

## 2020-01-11 LAB
ALBUMIN SERPL-MCNC: 3.3 G/DL (ref 3.4–5)
ALP SERPL-CCNC: 126 U/L (ref 45–117)
ALT SERPL-CCNC: 11 U/L (ref 13–61)
ANION GAP SERPL CALC-SCNC: 9 MMOL/L (ref 8–16)
AST SERPL-CCNC: 11 U/L (ref 15–37)
BASOPHILS # BLD: 1.2 % (ref 0–2)
BILIRUB SERPL-MCNC: 0.2 MG/DL (ref 0.2–1)
BUN SERPL-MCNC: 43.3 MG/DL (ref 7–18)
CALCIUM SERPL-MCNC: 8.7 MG/DL (ref 8.5–10.1)
CHLORIDE SERPL-SCNC: 107 MMOL/L (ref 98–107)
CO2 SERPL-SCNC: 22 MMOL/L (ref 21–32)
CREAT SERPL-MCNC: 4.5 MG/DL (ref 0.55–1.3)
DEPRECATED RDW RBC AUTO: 15 % (ref 11.9–15.9)
EOSINOPHIL # BLD: 0.6 % (ref 0–4.5)
GLUCOSE SERPL-MCNC: 195 MG/DL (ref 74–106)
HCT VFR BLD CALC: 28.8 % (ref 35.4–49)
HGB BLD-MCNC: 9.1 GM/DL (ref 11.7–16.9)
LYMPHOCYTES # BLD: 19.9 % (ref 8–40)
MCH RBC QN AUTO: 27.4 PG (ref 25.7–33.7)
MCHC RBC AUTO-ENTMCNC: 31.6 G/DL (ref 32–35.9)
MCV RBC: 86.7 FL (ref 80–96)
MONOCYTES # BLD AUTO: 5.6 % (ref 3.8–10.2)
NEUTROPHILS # BLD: 72.7 % (ref 42.8–82.8)
PLATELET # BLD AUTO: 143 K/MM3 (ref 134–434)
PMV BLD: 9.5 FL (ref 7.5–11.1)
POTASSIUM SERPLBLD-SCNC: 4.9 MMOL/L (ref 3.5–5.1)
PROT SERPL-MCNC: 6.6 G/DL (ref 6.4–8.2)
RBC # BLD AUTO: 3.32 M/MM3 (ref 4–5.6)
SODIUM SERPL-SCNC: 137 MMOL/L (ref 136–145)
WBC # BLD AUTO: 4.9 K/MM3 (ref 4–10)

## 2020-01-11 RX ADMIN — AMLODIPINE BESYLATE SCH MG: 10 TABLET ORAL at 09:18

## 2020-01-11 RX ADMIN — HEPARIN SODIUM SCH UNIT: 5000 INJECTION, SOLUTION INTRAVENOUS; SUBCUTANEOUS at 22:12

## 2020-01-11 RX ADMIN — INSULIN ASPART SCH: 100 INJECTION, SOLUTION INTRAVENOUS; SUBCUTANEOUS at 06:01

## 2020-01-11 RX ADMIN — HEPARIN SODIUM SCH UNIT: 5000 INJECTION, SOLUTION INTRAVENOUS; SUBCUTANEOUS at 14:17

## 2020-01-11 RX ADMIN — PANTOPRAZOLE SODIUM SCH MG: 40 TABLET, DELAYED RELEASE ORAL at 09:18

## 2020-01-11 RX ADMIN — DOCUSATE SODIUM SCH MG: 100 CAPSULE, LIQUID FILLED ORAL at 22:12

## 2020-01-11 RX ADMIN — ACETAMINOPHEN PRN MG: 325 TABLET ORAL at 09:08

## 2020-01-11 RX ADMIN — VENLAFAXINE HYDROCHLORIDE SCH MG: 25 TABLET ORAL at 09:43

## 2020-01-11 RX ADMIN — ATORVASTATIN CALCIUM SCH MG: 10 TABLET, FILM COATED ORAL at 22:13

## 2020-01-11 RX ADMIN — IPRATROPIUM BROMIDE AND ALBUTEROL SULFATE SCH AMP: .5; 3 SOLUTION RESPIRATORY (INHALATION) at 20:50

## 2020-01-11 RX ADMIN — INSULIN ASPART SCH UNITS: 100 INJECTION, SOLUTION INTRAVENOUS; SUBCUTANEOUS at 18:02

## 2020-01-11 RX ADMIN — INSULIN ASPART SCH UNITS: 100 INJECTION, SOLUTION INTRAVENOUS; SUBCUTANEOUS at 11:45

## 2020-01-11 RX ADMIN — HEPARIN SODIUM SCH: 5000 INJECTION, SOLUTION INTRAVENOUS; SUBCUTANEOUS at 06:01

## 2020-01-11 RX ADMIN — METHADONE HYDROCHLORIDE SCH MG: 40 TABLET ORAL at 10:49

## 2020-01-11 RX ADMIN — IPRATROPIUM BROMIDE AND ALBUTEROL SULFATE SCH: .5; 3 SOLUTION RESPIRATORY (INHALATION) at 12:17

## 2020-01-11 RX ADMIN — SODIUM CHLORIDE SCH: 4.5 INJECTION, SOLUTION INTRAVENOUS at 11:45

## 2020-01-11 RX ADMIN — IPRATROPIUM BROMIDE AND ALBUTEROL SULFATE SCH: .5; 3 SOLUTION RESPIRATORY (INHALATION) at 16:30

## 2020-01-11 RX ADMIN — MULTIVITAMIN TABLET SCH TAB: TABLET at 09:18

## 2020-01-11 RX ADMIN — IPRATROPIUM BROMIDE AND ALBUTEROL SULFATE SCH: .5; 3 SOLUTION RESPIRATORY (INHALATION) at 08:21

## 2020-01-11 NOTE — PN
Progress Note, Physician


History of Present Illness: 





Pt seen and examined at bedside. He is asking to go home. He denies shortness 

of breath. 





- Current Medication List


Current Medications: 


Active Medications





Acetaminophen (Tylenol -)  650 mg PO Q6H PRN


   PRN Reason: PAIN


   Last Admin: 01/11/20 09:08 Dose:  650 mg


Albuterol Sulfate (Ventolin 0.083% Nebulizer Soln -)  1 amp NEB Q4H PRN


   PRN Reason: SHORT OF BREATH/WHEEZING


Albuterol/Ipratropium (Duoneb -)  1 amp NEB RQID [facility]


   Last Admin: 01/11/20 16:30 Dose:  Not Given


Amlodipine Besylate (Norvasc -)  10 mg PO DAILY [facility]


   Last Admin: 01/11/20 09:18 Dose:  10 mg


Atorvastatin Calcium (Lipitor -)  10 mg PO HS [facility]


   Last Admin: 01/10/20 23:03 Dose:  10 mg


Docusate Sodium (Colace -)  300 mg PO HS [facility]


   Last Admin: 01/10/20 23:03 Dose:  300 mg


Ergocalciferol (Drisdol -)  50,000 unit PO Q7D [facility]


Heparin Sodium (Porcine) (Heparin -)  5,000 unit SQ TID [facility]


   Last Admin: 01/11/20 14:17 Dose:  5,000 unit


Sodium Chloride (1/2 Normal Saline)  1,000 mls @ 100 mls/hr IV ASDIR [facility]


   Last Admin: 01/11/20 11:45 Dose:  Not Given


Insulin Aspart (Novolog Vial Sliding Scale -)  1 vial SQ ACHS [facility]; Protocol


   Last Admin: 01/11/20 11:45 Dose:  2 units


Methadone HCl 40 mg/ Methadone (HCl 20 mg)  60 mg PO DAILY@0600 [facility]


   Last Admin: 01/11/20 10:49 Dose:  60 mg


Multivitamins/Minerals/Vitamin C (Tab-A-Vit -)  1 tab PO DAILY [facility]


   Last Admin: 01/11/20 09:18 Dose:  1 tab


Pantoprazole Sodium (Protonix -)  40 mg PO DAILY [facility]


   Last Admin: 01/11/20 09:18 Dose:  40 mg


Venlafaxine HCl (Effexor -)  50 mg PO DAILY [facility]


   Last Admin: 01/11/20 09:43 Dose:  50 mg











- Objective


Vital Signs: 


 Vital Signs











Temperature  98.2 F   01/10/20 23:41


 


Pulse Rate  90   01/11/20 09:20


 


Respiratory Rate  22 H  01/11/20 09:20


 


Blood Pressure  160/60   01/11/20 09:20


 


O2 Sat by Pulse Oximetry (%)  95   01/11/20 09:00











Constitutional: Yes: Anxious


Eyes: Yes: Conjunctiva Clear


HENT: Yes: Atraumatic


Cardiovascular: Yes: S1, S2


Respiratory: Yes: CTA Bilaterally


Gastrointestinal: Yes: Normal Bowel Sounds, Soft


Genitourinary: Yes: WNL


Edema: No


Psychiatric: Yes: Agitated


Labs: 


 CBC, BMP





 01/11/20 15:25 





 01/11/20 15:25 











Problem List





- Problems


(1) Acute kidney failure


Code(s): N17.9 - ACUTE KIDNEY FAILURE, UNSPECIFIED   


Qualifiers: 


   Acute renal failure type: unspecified   Qualified Code(s): N17.9 - Acute 

kidney failure, unspecified   





Assessment/Plan


 Current Medications











Generic Name Dose Route Start Last Admin





  Trade Name Freq  PRN Reason Stop Dose Admin


 


Acetaminophen  650 mg  01/10/20 14:43  01/11/20 09:08





  Tylenol -  PO   650 mg





  Q6H PRN   Administration





  PAIN   





     





     





     


 


Albuterol Sulfate  1 amp  01/10/20 17:48  





  Ventolin 0.083% Nebulizer Soln -  NEB   





  Q4H PRN   





  SHORT OF BREATH/WHEEZING   





     





     





     


 


Albuterol/Ipratropium  1 amp  01/10/20 20:00  01/11/20 16:30





  Duoneb -  NEB   Not Given





  RQID JOHN   





     





     





     





     


 


Amlodipine Besylate  10 mg  01/11/20 10:00  01/11/20 09:18





  Norvasc -  PO   10 mg





  DAILY JOHN   Administration





     





     





     





     


 


Atorvastatin Calcium  10 mg  01/10/20 22:00  01/10/20 23:03





  Lipitor -  PO   10 mg





  HS JOHN   Administration





     





     





     





     


 


Docusate Sodium  300 mg  01/10/20 22:00  01/10/20 23:03





  Colace -  PO   300 mg





  HS JOHN   Administration





     





     





     





     


 


Ergocalciferol  50,000 unit  01/10/20 17:45  





  Drisdol -  PO   





  Q7D JOHN   





     





     





     





     


 


Heparin Sodium (Porcine)  5,000 unit  01/10/20 06:00  01/11/20 14:17





  Heparin -  SQ   5,000 unit





  TID JOHN   Administration





     





     





     





     


 


Sodium Chloride  1,000 mls @ 100 mls/hr  01/10/20 11:30  01/11/20 11:45





  1/2 Normal Saline  IV   Not Given





  ASDIR JOHN   





     





     





     





     


 


Insulin Aspart  1 vial  01/10/20 07:00  01/11/20 11:45





  Novolog Vial Sliding Scale -  SQ   2 units





  ACHS JOHN   Administration





     





     





  Protocol   





     


 


Methadone HCl 40 mg/ Methadone  60 mg  01/11/20 10:45  01/11/20 10:49





  HCl 20 mg  PO   60 mg





  DAILY@0600 JOHN   Administration





     





     





     





     


 


Multivitamins/Minerals/Vitamin C  1 tab  01/11/20 10:00  01/11/20 09:18





  Tab-A-Vit -  PO   1 tab





  DAILY JOHN   Administration





     





     





     





     


 


Pantoprazole Sodium  40 mg  01/10/20 18:00  01/11/20 09:18





  Protonix -  PO   40 mg





  DAILY JOHN   Administration





     





     





     





     


 


Venlafaxine HCl  50 mg  01/11/20 10:00  01/11/20 09:43





  Effexor -  PO   50 mg





  DAILY JOHN   Administration





     





     





     





     














Impression


1. ROJELIO


2. hx CKD


3. DM


4. non compliance


5. narcotic dependence


6. insomnia


7. hyperlipidemia


8. urinary retention s/p suprapubic cath


9. hx anemia


10 Hep C


11. nephrolithiasis


12. prostate ca with mets





Plan


- renal function is not changed with fluids


- possible progression of kidney disease


- repeat labs in am


- check vanco levels


- cont to monitor crt


- will obtain office records on Monday


- urine cultures


- no indication for urgent hd

## 2020-01-11 NOTE — EKG
Test Reason : 

Blood Pressure : ***/*** mmHG

Vent. Rate : 070 BPM     Atrial Rate : 070 BPM

   P-R Int : 164 ms          QRS Dur : 086 ms

    QT Int : 422 ms       P-R-T Axes : 039 045 039 degrees

   QTc Int : 455 ms

 

NORMAL SINUS RHYTHM

NORMAL ECG

WHEN COMPARED WITH ECG OF 09-JAN-2020 18:48,

ST NO LONGER DEPRESSED IN INFERIOR LEADS

Confirmed by NEO CROCKETT MD ([id number]) on 1/11/2020 9:12:43 AM

 

Referred By:             Confirmed By:NEO CROCKETT MD

## 2020-01-11 NOTE — CONS
DATE OF CONSULTATION:  

 

DATE OF DICTATION:  01/10/2020

 

HISTORY OF PRESENT ILLNESS:  Patient is a 64-year-old male followed in my office for

suprapubic catheter maintenance.  He does have history of high blood pressure,

dyslipidemia, chronic kidney disease, has had localized prostate cancer with

radiation seed implantation more than 10 years earlier.  Patient developed a

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

catheter drainage for the past 4 years.  He was seen by his nephrologist who found a

creatinine of 5.  At that time the patient was advised to be admitted to the

hospital.  He denies any allergies.  He is on multiple medications including

Tenormin, Norvasc, Colace, Lipitor, Symbicort, Amaryl, Dolophine, multivitamin.  The

patient had an ultrasound of his kidneys and this revealed a left renal lower pole

stone measuring 7 x 4 mm.  There was also mild fullness of the left renal pelvis. 

There was a small right upper pole renal cyst measuring 1 cm; incidentally noted were

multiple small gallstones layering posteriorly for which further evaluation of the

gallbladder with ultrasound is needed.  The patient's laboratory data revealed a

white count of 6.4, hemoglobin and hematocrit of 9.4 over 28.9, platelets were 165. 

The patient's BUN and creatinine were 54 over 4.5, random glucose was 126.  Patient's

liver enzymes were within normal limits.  His BNP was 7721.  Urine micro is pending. 

 

 

IMPRESSION:  At present is a 64-year-old male with chronic suprapubic tube drainage. 

He presents with acute and chronic renal failure, noncompliant diabetic narcotic

dependent, insomnia and hyperlipidemia, urinary retention due to blocked suprapubic

catheter, history of hepatitis, nephrolithiasis, and localized prostate cancer.  Will

recommend a change in the patient's suprapubic tube.  Will also recommend an

extracorporeal shockwave lithotripsy on his left renal stone as an outpatient.  Will

follow with you.  

 

 

HALEY [id number]

DD: 01/10/2020 23:35

DT: 01/11/2020 01:01

Job #:  [id number]

## 2020-01-11 NOTE — PN
Progress Note, Physician


Chief Complaint: 





abnormal blood work


History of Present Illness: 





patient complaining of arm pain, wants to go home, agitated, last methadone 

dose 1/9/20





- Current Medication List


Current Medications: 


Active Medications





Acetaminophen (Tylenol -)  650 mg PO Q6H PRN


   PRN Reason: PAIN


   Last Admin: 01/11/20 09:08 Dose:  650 mg


Albuterol Sulfate (Ventolin 0.083% Nebulizer Soln -)  1 amp NEB Q4H PRN


   PRN Reason: SHORT OF BREATH/WHEEZING


Albuterol/Ipratropium (Duoneb -)  1 amp NEB RQID [facility]


   Last Admin: 01/11/20 08:21 Dose:  Not Given


Amlodipine Besylate (Norvasc -)  10 mg PO DAILY [facility]


   Last Admin: 01/11/20 09:18 Dose:  10 mg


Atorvastatin Calcium (Lipitor -)  10 mg PO HS [facility]


   Last Admin: 01/10/20 23:03 Dose:  10 mg


Docusate Sodium (Colace -)  300 mg PO HS [facility]


   Last Admin: 01/10/20 23:03 Dose:  300 mg


Ergocalciferol (Drisdol -)  50,000 unit PO Q7D [facility]


Heparin Sodium (Porcine) (Heparin -)  5,000 unit SQ TID [facility]


   Last Admin: 01/11/20 06:01 Dose:  Not Given


Sodium Chloride (1/2 Normal Saline)  1,000 mls @ 100 mls/hr IV ASDIR [facility]


   Last Admin: 01/10/20 23:05 Dose:  100 mls/hr


Insulin Aspart (Novolog Vial Sliding Scale -)  1 vial SQ ACHS [facility]; Protocol


   Last Admin: 01/11/20 06:01 Dose:  Not Given


Methadone HCl 40 mg/ Methadone (HCl 20 mg)  60 mg PO DAILY@0600 [facility]


Multivitamins/Minerals/Vitamin C (Tab-A-Vit -)  1 tab PO DAILY [facility]


   Last Admin: 01/11/20 09:18 Dose:  1 tab


Pantoprazole Sodium (Protonix -)  40 mg PO DAILY [facility]


   Last Admin: 01/11/20 09:18 Dose:  40 mg


Venlafaxine HCl (Effexor -)  50 mg PO DAILY [facility]


   Last Admin: 01/11/20 09:43 Dose:  50 mg











- Objective


Vital Signs: 


 Vital Signs











Temperature  98.2 F   01/10/20 23:41


 


Pulse Rate  90   01/11/20 09:20


 


Respiratory Rate  22 H  01/11/20 09:20


 


Blood Pressure  160/60   01/11/20 09:20


 


O2 Sat by Pulse Oximetry (%)  95   01/11/20 09:00











Constitutional: Yes: Other (agitated)


Cardiovascular: Yes: WNL, Regular Rate and Rhythm


Respiratory: Yes: WNL, Regular, CTA Bilaterally


Gastrointestinal: Yes: WNL, Normal Bowel Sounds, Soft


Genitourinary: Yes: Anglin Present (SP catheter)


Musculoskeletal: Yes: WNL


Extremities: Yes: WNL


Edema: No


Labs: 


 CBC, BMP





 01/10/20 05:25 





 01/10/20 05:25 











Problem List





- Problems


(1) Acute kidney failure


Code(s): N17.9 - ACUTE KIDNEY FAILURE, UNSPECIFIED   


Qualifiers: 


   Acute renal failure type: unspecified   Qualified Code(s): N17.9 - Acute 

kidney failure, unspecified   





(2) COPD (chronic obstructive pulmonary disease)


Code(s): J44.9 - CHRONIC OBSTRUCTIVE PULMONARY DISEASE, UNSPECIFIED   


Qualifiers: 


   COPD type: unspecified COPD   Qualified Code(s): J44.9 - Chronic obstructive 

pulmonary disease, unspecified   





(3) Chronic kidney disease


Code(s): N18.9 - CHRONIC KIDNEY DISEASE, UNSPECIFIED   


Qualifiers: 


   Chronic kidney disease stage: stage 3 (moderate)   Qualified Code(s): N18.3 

- Chronic kidney disease, stage 3 (moderate)   





(4) Chronic pain


Code(s): G89.29 - OTHER CHRONIC PAIN   





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


Code(s): E11.59 - TYPE 2 DIABETES MELLITUS WITH OTH CIRCULATORY COMPLICATIONS; 

I70.209 - UNSP ATHSCL NATIVE ARTERIES OF EXTREMITIES, UNSP EXTREMITY   





(6) HTN (hypertension)


Code(s): I10 - ESSENTIAL (PRIMARY) HYPERTENSION   


Qualifiers: 


   Hypertension type: essential hypertension   Qualified Code(s): I10 - 

Essential (primary) hypertension   





(7) History of prostate cancer


Code(s): Z85.46 - PERSONAL HISTORY OF MALIGNANT NEOPLASM OF PROSTATE   








Assessment/Plan





Assessment/Plan


65 yo M PMH of HTN, HLD, CKD, metastatic prostate Ca, COPD, prior CVA, Hep C 

who presented to the ED on instruction from Dr. Heck for elevated creatinine, 

acute renal failure.





1) Acute on chronic renal failure


-unsure of patient baseline creat


-started on fluids


-monitor i/os


-renal following





HTN


HLD


Met Prostate CA


COPD


CVA hx


Hep C


AOCD


Mood d/o


DM2





-cw current mnmgt

## 2020-01-11 NOTE — PN
Progress Note, Physician


History of Present Illness: 





65 yo M PMH of HTN, HLD, CKD, metastatic prostate Ca,COPD, prior CVA, Hep C 

presents to ED on instruction from Dr. Heck. The pt saw Dr. Maria R Art on 1/2/2020. PT was not willing to answer questions and was not 

cooperative. on communication with his wife, pt sees his primary frequently and 

sees specialists. pt reports that the suprapubic gets changed every month. Pt 

denies complaints 


denies any fever , chills, N/V/D?C 


denies any complain except left hand finger deformity 


asking to go home . 





- Current Medication List


Current Medications: 


Active Medications





Acetaminophen (Tylenol -)  650 mg PO Q6H PRN


   PRN Reason: PAIN


   Last Admin: 01/11/20 09:08 Dose:  650 mg


Albuterol Sulfate (Ventolin 0.083% Nebulizer Soln -)  1 amp NEB Q4H PRN


   PRN Reason: SHORT OF BREATH/WHEEZING


Albuterol/Ipratropium (Duoneb -)  1 amp NEB RQID [facility]


   Last Admin: 01/11/20 16:30 Dose:  Not Given


Amlodipine Besylate (Norvasc -)  10 mg PO DAILY [facility]


   Last Admin: 01/11/20 09:18 Dose:  10 mg


Atorvastatin Calcium (Lipitor -)  10 mg PO HS [facility]


   Last Admin: 01/10/20 23:03 Dose:  10 mg


Docusate Sodium (Colace -)  300 mg PO HS [facility]


   Last Admin: 01/10/20 23:03 Dose:  300 mg


Ergocalciferol (Drisdol -)  50,000 unit PO Q7D [facility]


Heparin Sodium (Porcine) (Heparin -)  5,000 unit SQ TID [facility]


   Last Admin: 01/11/20 14:17 Dose:  5,000 unit


Sodium Chloride (1/2 Normal Saline)  1,000 mls @ 100 mls/hr IV ASDIR [facility]


   Last Admin: 01/11/20 11:45 Dose:  Not Given


Insulin Aspart (Novolog Vial Sliding Scale -)  1 vial SQ ACHS [facility]; Protocol


   Last Admin: 01/11/20 18:02 Dose:  4 units


Methadone HCl 40 mg/ Methadone (HCl 20 mg)  60 mg PO DAILY@0600 [facility]


   Last Admin: 01/11/20 10:49 Dose:  60 mg


Multivitamins/Minerals/Vitamin C (Tab-A-Vit -)  1 tab PO DAILY [facility]


   Last Admin: 01/11/20 09:18 Dose:  1 tab


Pantoprazole Sodium (Protonix -)  40 mg PO DAILY [facility]


   Last Admin: 01/11/20 09:18 Dose:  40 mg


Venlafaxine HCl (Effexor -)  50 mg PO DAILY [facility]


   Last Admin: 01/11/20 09:43 Dose:  50 mg











- Objective


Vital Signs: 


 Vital Signs











Temperature  97.9 F   01/11/20 19:34


 


Pulse Rate  65   01/11/20 19:34


 


Respiratory Rate  20 01/11/20 19:34


 


Blood Pressure  150/74   01/11/20 19:34


 


O2 Sat by Pulse Oximetry (%)  95   01/11/20 10:00











Eyes: Yes: WNL, Conjunctiva Clear, EOM Intact


HENT: Yes: WNL, Atraumatic, Normocephalic


Neck: Yes: WNL, Supple, Trachea Midline


Cardiovascular: Yes: WNL, Regular Rate and Rhythm


Respiratory: Yes: WNL, Regular, CTA Bilaterally


Gastrointestinal: Yes: WNL, Normal Bowel Sounds


Genitourinary: Yes: WNL


Musculoskeletal: Yes: WNL


Extremities: Yes: WNL


Edema: No


Integumentary: Yes: WNL


Neurological: Yes: WNL, Alert, Oriented


...Motor Strength: WNL


Psychiatric: Yes: WNL


Labs: 


 CBC, BMP





 01/11/20 15:25 





 01/11/20 15:25 











Problem List





- Problems


(1) Acute kidney failure


Code(s): N17.9 - ACUTE KIDNEY FAILURE, UNSPECIFIED   


Qualifiers: 


   Acute renal failure type: unspecified   Qualified Code(s): N17.9 - Acute 

kidney failure, unspecified   





(2) AMA - Signed out against medical advice


Code(s): Z53.20 - PROC/TRTMT NOT CRD OUT BEC PT DECISION FOR UNSP REASONS   





(3) Acute pharyngitis


Code(s): J02.9 - ACUTE PHARYNGITIS, UNSPECIFIED   





(4) Anemia


Code(s): D64.9 - ANEMIA, UNSPECIFIED   


Qualifiers: 


   Anemia type: unspecified type   Qualified Code(s): D64.9 - Anemia, 

unspecified   





(5) Bacteremia


Code(s): R78.81 - BACTEREMIA   





(6) Bandemia


Code(s): D72.825 - BANDEMIA   





(7) Cellulitis


Code(s): L03.90 - CELLULITIS, UNSPECIFIED   





(8) DKA (diabetic ketoacidoses)


Code(s): E13.10 - OTH DIABETES MELLITUS WITH KETOACIDOSIS WITHOUT COMA   


Qualifiers: 


   Diabetes mellitus type: type 2   Diabetes mellitus complication detail: 

without coma   Qualified Code(s): E11.10 - Type 2 diabetes mellitus with 

ketoacidosis without coma   





(9) Hyperkalemia


Code(s): E87.5 - HYPERKALEMIA   





(10) Left against medical advice


Code(s): Z53.20 - PROC/TRTMT NOT CRD OUT BEC PT DECISION FOR UNSP REASONS   





(11) Lesion of pelvic bone


Code(s): M89.9 - DISORDER OF BONE, UNSPECIFIED   





(12) MRSA (methicillin resistant Staphylococcus aureus)


Code(s): A49.02 - METHICILLIN RESIS STAPH INFECTION, UNSP SITE   





(13) Neck stiffness


Code(s): M43.6 - TORTICOLLIS   





(14) Necrotizing fasciitis


Code(s): M72.6 - NECROTIZING FASCIITIS   





(15) Sepsis


Code(s): A41.9 - SEPSIS, UNSPECIFIED ORGANISM   





(16) UTI (urinary tract infection)


Code(s): N39.0 - URINARY TRACT INFECTION, SITE NOT SPECIFIED   


Qualifiers: 


   Urinary tract infection type: site unspecified   Hematuria presence: without 

hematuria   Qualified Code(s): N39.0 - Urinary tract infection, site not 

specified   





(17) Underweight


Code(s): R63.6 - UNDERWEIGHT   





(18) Wound infection


Code(s): T14.8 - OTHER INJURY OF UNSPECIFIED BODY REGION * DO NOT USE *; L08.9 

- LOCAL INFECTION OF THE SKIN AND SUBCUTANEOUS TISSUE, UNSP   








(20) Alcohol abuse


Code(s): F10.10 - ALCOHOL ABUSE, UNCOMPLICATED   





(21) Ankle sprain


Code(s): S93.409A - SPRAIN OF UNSP LIGAMENT OF UNSPECIFIED ANKLE, INIT ENCNTR   





(22) Bilateral lower extremity edema


Code(s): R60.0 - LOCALIZED EDEMA   





(23) COPD (chronic obstructive pulmonary disease)


Code(s): J44.9 - CHRONIC OBSTRUCTIVE PULMONARY DISEASE, UNSPECIFIED   


Qualifiers: 


   COPD type: unspecified COPD   Qualified Code(s): J44.9 - Chronic obstructive 

pulmonary disease, unspecified   





(24) Chronic kidney disease


Code(s): N18.9 - CHRONIC KIDNEY DISEASE, UNSPECIFIED   


Qualifiers: 


   Chronic kidney disease stage: stage 3 (moderate)   Qualified Code(s): N18.3 

- Chronic kidney disease, stage 3 (moderate)   





(25) Chronic pain


Code(s): G89.29 - OTHER CHRONIC PAIN   





(26) Chronic renal insufficiency, stage III (moderate)


Code(s): N18.3 - CHRONIC KIDNEY DISEASE, STAGE 3 (MODERATE)   





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


Code(s): E11.59 - TYPE 2 DIABETES MELLITUS WITH OTH CIRCULATORY COMPLICATIONS; 

I70.209 - UNSP ATHSCL NATIVE ARTERIES OF EXTREMITIES, UNSP EXTREMITY   





(28) Ganglion cyst of flexor tendon sheath of finger of left hand


Code(s): M67.442 - GANGLION, LEFT HAND   





(29) HTN (hypertension)


Code(s): I10 - ESSENTIAL (PRIMARY) HYPERTENSION   


Qualifiers: 


   Hypertension type: essential hypertension   Qualified Code(s): I10 - 

Essential (primary) hypertension   





(30) Hemiplegia as late effect of cerebrovascular accident


Code(s): I69.359 - HEMIPLGA FOLLOWING CEREBRAL INFARCTION AFFECTING UNSP SIDE   





(31) Hepatitis C virus carrier state


Code(s): B18.2 - CHRONIC VIRAL HEPATITIS C   





(32) Hip pain


Code(s): M25.559 - PAIN IN UNSPECIFIED HIP   


Qualifiers: 


   Laterality: left   Qualified Code(s): M25.552 - Pain in left hip   





(33) History of prostate cancer


Code(s): Z85.46 - PERSONAL HISTORY OF MALIGNANT NEOPLASM OF PROSTATE   





(34) Impaired mobility


Code(s): Z74.09 - OTHER REDUCED MOBILITY   








(36) Myelopathy of thoracic region


Code(s): M47.14 - OTHER SPONDYLOSIS WITH MYELOPATHY, THORACIC REGION   





(37) Nicotine dependence


Code(s): F17.200 - NICOTINE DEPENDENCE, UNSPECIFIED, UNCOMPLICATED   





(38) Post herpetic neuralgia


Code(s): B02.29 - OTHER POSTHERPETIC NERVOUS SYSTEM INVOLVEMENT   





(39) Spondylolisthesis


Code(s): M43.10 - SPONDYLOLISTHESIS, SITE UNSPECIFIED   





(40) Urinary incontinence


Code(s): R32 - UNSPECIFIED URINARY INCONTINENCE   





(41) Vitamin D deficiency


Code(s): E55.9 - VITAMIN D DEFICIENCY, UNSPECIFIED   





(42) Wound of abdomen


Code(s): S31.109A - UNSP OPN WND ABD WALL, UNSP Q W/O PENET PERIT CAV, INIT   














(46) Prostate cancer metastatic to bone


Code(s): C61 - MALIGNANT NEOPLASM OF PROSTATE; C79.51 - SECONDARY MALIGNANT 

NEOPLASM OF BONE   





(47) Chronic use of opiate drugs therapeutic purposes


Code(s): Z79.899 - OTHER LONG TERM (CURRENT) DRUG THERAPY   





Assessment/Plan


IPP;





1. ROJELIO


2. hx CKD


3. DM


4. non compliance


5. narcotic dependence


6. insomnia


7. hyperlipidemia


8. urinary retention s/p suprapubic cath


9. hx anemia


10 Hep C


11. nephrolithiasis


12. prostate ca with mets


13. elevated BNP - difficult to interpret in the setting of ROJELIO


14. TNIs nl


15. Clinicaly not in CHF


16. Uncontrolled HTN











PLan;





Restart BP meds


IVF as per renal


ICU monitoring 


DVT PLx


C

## 2020-01-11 NOTE — CON.CARD
Consult


Consult Specialty:: Cardiology for dr. Mitchell





- History of Present Illness


History of Present Illness: 





65 yo M PMH of HTN, HLD, CKD, metastatic prostate Ca,COPD, prior CVA, Hep C 

presents to ED on instruction from Dr. Heck. The pt saw Dr. Maria R Art on 1/2/2020. PT was not willing to answer questions and was not 

cooperative. on communication with his wife, pt sees his primary frequently and 

sees specialists. pt reports that the suprapubic gets changed every month. Pt 

denies complaints 


denies any fever , chills, N/V/D?C 


denies any complain except left hand finger deformity 


asking to go home . 





- History Source


History Provided By: Patient, Medical Record





- Past Medical History


CNS: Yes: CVA


Cardio/Vascular: Yes: HTN


Pulmonary: Yes: COPD


Hepatobiliary: Yes: Hepatitis C


Renal/: Yes: Renal Inusuff, Other (suprapubic cath)


Infectious Disease: Yes: Other (hep C)


Psych: Yes: Addictions


Musculoskeletal: Yes: Chronic low back pain


Endocrine: Yes: Diabetes Mellitus





- Alcohol/Substance Use


Hx Alcohol Use: No (No longer drinks after tx.)


History of Substance Use: reports: Cocaine, Heroin, Marijuana





- Smoking History


Smoking history: Current every day smoker


Have you smoked in the past 12 months: Yes


Aproximately how many cigarettes per day: 10





- Social History


Usual Living Arrangement: Alone


Occupation: not working





Home Medications





- Allergies


Allergies/Adverse Reactions: 


 Allergies











Allergy/AdvReac Type Severity Reaction Status Date / Time


 


No Known Allergies Allergy   Verified 01/09/20 18:41














- Home Medications


Home Medications: 


Ambulatory Orders





Docusate Sodium [Colace -] 300 mg PO HS #30 capsule 11/19/18 


Amlodipine Besylate [Norvasc -] 10 mg PO DAILY 10/09/19 


Atorvastatin Ca [Lipitor] 10 mg PO HS 10/09/19 


Glimepiride [Amaryl -] 2 mg PO DAILY 10/09/19 


Insulin Glargine,Hum.rec.anlog [Basaglar Kwikpen U-100] 15 unit SQ HS 10/09/19 


Methadone [Dolophine -] 60 mg PO DAILY@0600 10/09/19 


Multivitamins [Multivit ([facility] Form[facility])] 1 tab PO DAILY #30 tab 10/09/19 


Tamsulosin HCl [Flomax] 0.4 mg PO DAILY 10/09/19 


Acetaminophen 500 mg PO QID PRN #120 tablet MDD 4 12/04/19 


Cyclobenzaprine HCl [Flexeril -] 10 mg PO HS #10 tablet 12/04/19 


Ergocalciferol (Vitamin D2) [Vitamin D2] 50,000 unit PO Q7D #4 capsule 12/04/19 


Lidocaine 5% Patch [Lidoderm Patch -] 2 patch TP DAILY #60 patch MDD 2 12/04/19 


Pregabalin [Lyrica -] 75 mg PO TID 12/04/19 


Sodium Bicarbonate - 1,300 mg PO TID 01/10/20 


Venlafaxine HCl [Effexor -] 50 mg PO DAILY 01/10/20 











Review of Systems





- Review of Systems


Constitutional: reports: No Symptoms


Eyes: reports: No Symptoms


HENT: reports: No Symptoms


Neck: reports: No Symptoms


Cardiovascular: reports: No Symptoms


Gastrointestinal: reports: No Symptoms


Genitourinary: reports: No Symptoms


Breasts: reports: No Symptoms Reported


Musculoskeletal: reports: No Symptoms


Integumentary: reports: No Symptoms


Neurological: reports: No Symptoms


Endocrine: reports: No Symptoms


Hematology/Lymphatic: reports: No Symptoms


Psychiatric: reports: No Symptoms


Vital Signs: 


 Vital Signs











Temperature  98.2 F   01/10/20 23:41


 


Pulse Rate  65   01/10/20 23:41


 


Respiratory Rate  17   01/10/20 23:41


 


Blood Pressure  165/71   01/10/20 23:41


 


O2 Sat by Pulse Oximetry (%)  95   01/10/20 23:10











Constitutional: Yes: Well Nourished, No Distress, Calm


Eyes: Yes: WNL, Conjunctiva Clear, EOM Intact


HENT: Yes: WNL, Atraumatic, Normocephalic


Neck: Yes: WNL, Supple, Trachea Midline


Respiratory: Yes: WNL, Regular, CTA Bilaterally


Gastrointestinal: Yes: WNL, Normal Bowel Sounds


Renal/: Yes: WNL


Cardiovascular: Yes: WNL, Regular Rate and Rhythm


Musculoskeletal: Yes: WNL


Extremities: Yes: WNL


Integumentary: Yes: WNL


Neurological: Yes: WNL, Alert, Oriented


...Motor Strength: WNL


Psychiatric: Yes: WNL, Alert, Oriented





- Other Data


Labs, Other Data: 


 CBC, BMP





 01/10/20 05:25 





 01/10/20 05:25 











Imaging





- Results


Chest X-ray: Pending


EKG: Image Reviewed (sr ? old AS MI)


Other: Report Reviewed (ECHO lat wall HK EF 45-50%)





Problem List





- Problems


(1) Acute kidney failure


Code(s): N17.9 - ACUTE KIDNEY FAILURE, UNSPECIFIED   


Qualifiers: 


   Acute renal failure type: unspecified   Qualified Code(s): N17.9 - Acute 

kidney failure, unspecified   





(2) AMA - Signed out against medical advice


Code(s): Z53.20 - PROC/TRTMT NOT CRD OUT BEC PT DECISION FOR UNSP REASONS   





(3) Acute pharyngitis


Code(s): J02.9 - ACUTE PHARYNGITIS, UNSPECIFIED   





(4) Anemia


Code(s): D64.9 - ANEMIA, UNSPECIFIED   


Qualifiers: 


   Anemia type: unspecified type   Qualified Code(s): D64.9 - Anemia, 

unspecified   





(5) Bacteremia


Code(s): R78.81 - BACTEREMIA   





(6) Bandemia


Code(s): D72.825 - BANDEMIA   





(7) Cellulitis


Code(s): L03.90 - CELLULITIS, UNSPECIFIED   





(8) DKA (diabetic ketoacidoses)


Code(s): E13.10 - OTH DIABETES MELLITUS WITH KETOACIDOSIS WITHOUT COMA   


Qualifiers: 


   Diabetes mellitus type: type 2   Diabetes mellitus complication detail: 

without coma   Qualified Code(s): E11.10 - Type 2 diabetes mellitus with 

ketoacidosis without coma   





(9) Hyperkalemia


Code(s): E87.5 - HYPERKALEMIA   





(10) Left against medical advice


Code(s): Z53.20 - PROC/TRTMT NOT CRD OUT BEC PT DECISION FOR UNSP REASONS   





(11) Lesion of pelvic bone


Code(s): M89.9 - DISORDER OF BONE, UNSPECIFIED   





(12) MRSA (methicillin resistant Staphylococcus aureus)


Code(s): A49.02 - METHICILLIN RESIS STAPH INFECTION, UNSP SITE   





(13) Neck stiffness


Code(s): M43.6 - TORTICOLLIS   





(14) Necrotizing fasciitis


Code(s): M72.6 - NECROTIZING FASCIITIS   





(15) Sepsis


Code(s): A41.9 - SEPSIS, UNSPECIFIED ORGANISM   





(16) UTI (urinary tract infection)


Code(s): N39.0 - URINARY TRACT INFECTION, SITE NOT SPECIFIED   


Qualifiers: 


   Urinary tract infection type: site unspecified   Hematuria presence: without 

hematuria   Qualified Code(s): N39.0 - Urinary tract infection, site not 

specified   





(17) Underweight


Code(s): R63.6 - UNDERWEIGHT   





(18) Wound infection


Code(s): T14.8 - OTHER INJURY OF UNSPECIFIED BODY REGION * DO NOT USE *; L08.9 

- LOCAL INFECTION OF THE SKIN AND SUBCUTANEOUS TISSUE, UNSP   








(20) Alcohol abuse


Code(s): F10.10 - ALCOHOL ABUSE, UNCOMPLICATED   





(21) Ankle sprain


Code(s): S93.409A - SPRAIN OF UNSP LIGAMENT OF UNSPECIFIED ANKLE, INIT ENCNTR   





(22) Bilateral lower extremity edema


Code(s): R60.0 - LOCALIZED EDEMA   





(23) COPD (chronic obstructive pulmonary disease)


Code(s): J44.9 - CHRONIC OBSTRUCTIVE PULMONARY DISEASE, UNSPECIFIED   


Qualifiers: 


   COPD type: unspecified COPD   Qualified Code(s): J44.9 - Chronic obstructive 

pulmonary disease, unspecified   





(24) Chronic kidney disease


Code(s): N18.9 - CHRONIC KIDNEY DISEASE, UNSPECIFIED   


Qualifiers: 


   Chronic kidney disease stage: stage 3 (moderate)   Qualified Code(s): N18.3 

- Chronic kidney disease, stage 3 (moderate)   





(25) Chronic pain


Code(s): G89.29 - OTHER CHRONIC PAIN   





(26) Chronic renal insufficiency, stage III (moderate)


Code(s): N18.3 - CHRONIC KIDNEY DISEASE, STAGE 3 (MODERATE)   





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


Code(s): E11.59 - TYPE 2 DIABETES MELLITUS WITH OTH CIRCULATORY COMPLICATIONS; 

I70.209 - UNSP ATHSCL NATIVE ARTERIES OF EXTREMITIES, UNSP EXTREMITY   





(28) Ganglion cyst of flexor tendon sheath of finger of left hand


Code(s): M67.442 - GANGLION, LEFT HAND   





(29) HTN (hypertension)


Code(s): I10 - ESSENTIAL (PRIMARY) HYPERTENSION   


Qualifiers: 


   Hypertension type: essential hypertension   Qualified Code(s): I10 - 

Essential (primary) hypertension   





(30) Hemiplegia as late effect of cerebrovascular accident


Code(s): I69.359 - HEMIPLGA FOLLOWING CEREBRAL INFARCTION AFFECTING UNSP SIDE   





(31) Hepatitis C virus carrier state


Code(s): B18.2 - CHRONIC VIRAL HEPATITIS C   





(32) Hip pain


Code(s): M25.559 - PAIN IN UNSPECIFIED HIP   


Qualifiers: 


   Laterality: left   Qualified Code(s): M25.552 - Pain in left hip   





(33) History of prostate cancer


Code(s): Z85.46 - PERSONAL HISTORY OF MALIGNANT NEOPLASM OF PROSTATE   





(34) Impaired mobility


Code(s): Z74.09 - OTHER REDUCED MOBILITY   








(36) Myelopathy of thoracic region


Code(s): M47.14 - OTHER SPONDYLOSIS WITH MYELOPATHY, THORACIC REGION   





(37) Nicotine dependence


Code(s): F17.200 - NICOTINE DEPENDENCE, UNSPECIFIED, UNCOMPLICATED   





(38) Post herpetic neuralgia


Code(s): B02.29 - OTHER POSTHERPETIC NERVOUS SYSTEM INVOLVEMENT   





(39) Spondylolisthesis


Code(s): M43.10 - SPONDYLOLISTHESIS, SITE UNSPECIFIED   





(40) Urinary incontinence


Code(s): R32 - UNSPECIFIED URINARY INCONTINENCE   





(41) Vitamin D deficiency


Code(s): E55.9 - VITAMIN D DEFICIENCY, UNSPECIFIED   





(42) Wound of abdomen


Code(s): S31.109A - UNSP OPN WND ABD WALL, UNSP Q W/O PENET PERIT CAV, INIT   














(46) Prostate cancer metastatic to bone


Code(s): C61 - MALIGNANT NEOPLASM OF PROSTATE; C79.51 - SECONDARY MALIGNANT 

NEOPLASM OF BONE   





(47) Chronic use of opiate drugs therapeutic purposes


Code(s): Z79.899 - OTHER LONG TERM (CURRENT) DRUG THERAPY   





Assessment/Plan


IPP;





1. ROJELIO


2. hx CKD


3. DM


4. non compliance


5. narcotic dependence


6. insomnia


7. hyperlipidemia


8. urinary retention s/p suprapubic cath


9. hx anemia


10 Hep C


11. nephrolithiasis


12. prostate ca with mets


13. elevated BNP - difficult to interpret in the setting of ROJELIO


14. TNIs nl


15. Clinicaly not in CHF


16. Uncontrolled HTN











PLan;





Restart BP meds


IVF as per renal


ICU monitoring 


DVT PLx


CXR





CC time spent 75 min

## 2020-01-12 LAB
ALBUMIN SERPL-MCNC: 3.3 G/DL (ref 3.4–5)
ALP SERPL-CCNC: 124 U/L (ref 45–117)
ALT SERPL-CCNC: 9 U/L (ref 13–61)
ANION GAP SERPL CALC-SCNC: 7 MMOL/L (ref 8–16)
AST SERPL-CCNC: 13 U/L (ref 15–37)
BILIRUB SERPL-MCNC: 0.3 MG/DL (ref 0.2–1)
BUN SERPL-MCNC: 45.7 MG/DL (ref 7–18)
CALCIUM SERPL-MCNC: 9.2 MG/DL (ref 8.5–10.1)
CHLORIDE SERPL-SCNC: 111 MMOL/L (ref 98–107)
CO2 SERPL-SCNC: 19 MMOL/L (ref 21–32)
CREAT SERPL-MCNC: 4.3 MG/DL (ref 0.55–1.3)
GLUCOSE SERPL-MCNC: 135 MG/DL (ref 74–106)
POTASSIUM SERPLBLD-SCNC: 4.2 MMOL/L (ref 3.5–5.1)
PROT SERPL-MCNC: 6.6 G/DL (ref 6.4–8.2)
SODIUM SERPL-SCNC: 138 MMOL/L (ref 136–145)

## 2020-01-12 RX ADMIN — SODIUM BICARBONATE TAB 325 MG SCH MG: 325 TAB at 22:05

## 2020-01-12 RX ADMIN — INSULIN ASPART SCH: 100 INJECTION, SOLUTION INTRAVENOUS; SUBCUTANEOUS at 01:58

## 2020-01-12 RX ADMIN — INSULIN ASPART SCH: 100 INJECTION, SOLUTION INTRAVENOUS; SUBCUTANEOUS at 11:11

## 2020-01-12 RX ADMIN — INSULIN ASPART SCH UNITS: 100 INJECTION, SOLUTION INTRAVENOUS; SUBCUTANEOUS at 16:32

## 2020-01-12 RX ADMIN — PANTOPRAZOLE SODIUM SCH MG: 40 TABLET, DELAYED RELEASE ORAL at 09:44

## 2020-01-12 RX ADMIN — DOCUSATE SODIUM SCH MG: 100 CAPSULE, LIQUID FILLED ORAL at 22:04

## 2020-01-12 RX ADMIN — HEPARIN SODIUM SCH UNIT: 5000 INJECTION, SOLUTION INTRAVENOUS; SUBCUTANEOUS at 13:19

## 2020-01-12 RX ADMIN — SODIUM CHLORIDE SCH MLS/HR: 4.5 INJECTION, SOLUTION INTRAVENOUS at 16:33

## 2020-01-12 RX ADMIN — METHADONE HYDROCHLORIDE SCH MG: 40 TABLET ORAL at 06:10

## 2020-01-12 RX ADMIN — SODIUM CHLORIDE SCH: 4.5 INJECTION, SOLUTION INTRAVENOUS at 12:02

## 2020-01-12 RX ADMIN — IPRATROPIUM BROMIDE AND ALBUTEROL SULFATE SCH: .5; 3 SOLUTION RESPIRATORY (INHALATION) at 14:45

## 2020-01-12 RX ADMIN — HEPARIN SODIUM SCH UNIT: 5000 INJECTION, SOLUTION INTRAVENOUS; SUBCUTANEOUS at 06:15

## 2020-01-12 RX ADMIN — HEPARIN SODIUM SCH UNIT: 5000 INJECTION, SOLUTION INTRAVENOUS; SUBCUTANEOUS at 22:05

## 2020-01-12 RX ADMIN — ACETAMINOPHEN PRN MG: 325 TABLET ORAL at 21:00

## 2020-01-12 RX ADMIN — IPRATROPIUM BROMIDE AND ALBUTEROL SULFATE SCH: .5; 3 SOLUTION RESPIRATORY (INHALATION) at 11:55

## 2020-01-12 RX ADMIN — ACETAMINOPHEN PRN MG: 325 TABLET ORAL at 03:05

## 2020-01-12 RX ADMIN — INSULIN ASPART SCH: 100 INJECTION, SOLUTION INTRAVENOUS; SUBCUTANEOUS at 22:05

## 2020-01-12 RX ADMIN — ATORVASTATIN CALCIUM SCH MG: 10 TABLET, FILM COATED ORAL at 22:04

## 2020-01-12 RX ADMIN — IPRATROPIUM BROMIDE AND ALBUTEROL SULFATE SCH: .5; 3 SOLUTION RESPIRATORY (INHALATION) at 08:00

## 2020-01-12 RX ADMIN — AMLODIPINE BESYLATE SCH MG: 10 TABLET ORAL at 09:44

## 2020-01-12 RX ADMIN — INSULIN ASPART SCH: 100 INJECTION, SOLUTION INTRAVENOUS; SUBCUTANEOUS at 06:24

## 2020-01-12 RX ADMIN — VENLAFAXINE HYDROCHLORIDE SCH MG: 25 TABLET ORAL at 10:26

## 2020-01-12 RX ADMIN — ACETAMINOPHEN PRN MG: 325 TABLET ORAL at 10:26

## 2020-01-12 RX ADMIN — MULTIVITAMIN TABLET SCH TAB: TABLET at 09:44

## 2020-01-12 RX ADMIN — SODIUM BICARBONATE TAB 325 MG SCH MG: 325 TAB at 09:46

## 2020-01-12 NOTE — PN
Progress Note, Physician


History of Present Illness: 





Pt seen and examined at bedside. He is awake and alert. He denies fevers or 

chills. 





- Current Medication List


Current Medications: 


Active Medications





Acetaminophen (Tylenol -)  650 mg PO Q6H PRN


   PRN Reason: PAIN


   Last Admin: 01/12/20 10:26 Dose:  650 mg


Albuterol Sulfate (Ventolin 0.083% Nebulizer Soln -)  1 amp NEB Q4H PRN


   PRN Reason: SHORT OF BREATH/WHEEZING


   Last Admin: 01/12/20 15:00 Dose:  1 amp


Albuterol/Ipratropium (Duoneb -)  1 amp NEB RQID [facility]


   Last Admin: 01/12/20 14:45 Dose:  Not Given


Amlodipine Besylate (Norvasc -)  10 mg PO DAILY [facility]


   Last Admin: 01/12/20 09:44 Dose:  10 mg


Atorvastatin Calcium (Lipitor -)  10 mg PO HS [facility]


   Last Admin: 01/11/20 22:13 Dose:  10 mg


Docusate Sodium (Colace -)  300 mg PO [facility]


   Last Admin: 01/11/20 22:12 Dose:  100 mg


Ergocalciferol (Drisdol -)  50,000 unit PO We [facility]


Heparin Sodium (Porcine) (Heparin -)  5,000 unit SQ TID [facility]


   Last Admin: 01/12/20 13:19 Dose:  5,000 unit


Sodium Chloride (1/2 Normal Saline)  1,000 mls @ 100 mls/hr IV ASDIR [facility]


   Last Admin: 01/12/20 16:33 Dose:  100 mls/hr


Insulin Aspart (Novolog Vial Sliding Scale -)  1 vial SQ ACHS [facility]; Protocol


   Last Admin: 01/12/20 16:32 Dose:  4 units


Methadone HCl 40 mg/ Methadone (HCl 20 mg)  60 mg PO DAILY@0600 [facility]


   Last Admin: 01/12/20 06:10 Dose:  60 mg


Multivitamins/Minerals/Vitamin C (Tab-A-Vit -)  1 tab PO DAILY [facility]


   Last Admin: 01/12/20 09:44 Dose:  1 tab


Pantoprazole Sodium (Protonix -)  40 mg PO DAILY [facility]


   Last Admin: 01/12/20 09:44 Dose:  40 mg


Sodium Bicarbonate (Sodium Bicarbonate -)  325 mg PO BID [facility]


   Last Admin: 01/12/20 09:46 Dose:  325 mg


Venlafaxine HCl (Effexor -)  50 mg PO DAILY [facility]


   Last Admin: 01/12/20 10:26 Dose:  50 mg











- Objective


Vital Signs: 


 Vital Signs











Temperature  98.1 F   01/12/20 13:49


 


Pulse Rate  67   01/12/20 13:49


 


Respiratory Rate  20   01/12/20 13:49


 


Blood Pressure  145/76   01/12/20 13:49


 


O2 Sat by Pulse Oximetry (%)  95   01/11/20 10:00











Constitutional: Yes: Calm


Eyes: Yes: Conjunctiva Clear


HENT: Yes: Atraumatic


Neck: Yes: Supple


Cardiovascular: Yes: S1, S2


Respiratory: Yes: CTA Bilaterally


Gastrointestinal: Yes: Soft


Genitourinary: Yes: Anglin Present, Other


Musculoskeletal: Yes: Muscle Weakness


Neurological: Yes: Oriented


Psychiatric: Yes: Oriented


Labs: 


 CBC, BMP





 01/11/20 15:25 





 01/12/20 06:15 











Problem List





- Problems


(1) Acute kidney failure


Code(s): N17.9 - ACUTE KIDNEY FAILURE, UNSPECIFIED   


Qualifiers: 


   Acute renal failure type: unspecified   Qualified Code(s): N17.9 - Acute 

kidney failure, unspecified   





Assessment/Plan


 Current Medications











Generic Name Dose Route Start Last Admin





  Trade Name Freq  PRN Reason Stop Dose Admin


 


Acetaminophen  650 mg  01/10/20 14:43  01/12/20 10:26





  Tylenol -  PO   650 mg





  Q6H PRN   Administration





  PAIN   





     





     





     


 


Albuterol Sulfate  1 amp  01/10/20 17:48  01/12/20 15:00





  Ventolin 0.083% Nebulizer Soln -  NEB   1 amp





  Q4H PRN   Administration





  SHORT OF BREATH/WHEEZING   





     





     





     


 


Albuterol/Ipratropium  1 amp  01/10/20 20:00  01/12/20 14:45





  Duoneb -  NEB   Not Given





  RQID JOHN   





     





     





     





     


 


Amlodipine Besylate  10 mg  01/11/20 10:00  01/12/20 09:44





  Norvasc -  PO   10 mg





  DAILY JOHN   Administration





     





     





     





     


 


Atorvastatin Calcium  10 mg  01/10/20 22:00  01/11/20 22:13





  Lipitor -  PO   10 mg





  HS JOHN   Administration





     





     





     





     


 


Docusate Sodium  300 mg  01/10/20 22:00  01/11/20 22:12





  Colace -  PO   100 mg





  HS JOHN   Administration





     





     





     





     


 


Ergocalciferol  50,000 unit  01/15/20 10:00  





  Drisdol -  PO   





  We JOHN   





     





     





     





     


 


Heparin Sodium (Porcine)  5,000 unit  01/10/20 06:00  01/12/20 13:19





  Heparin -  SQ   5,000 unit





  TID JOHN   Administration





     





     





     





     


 


Sodium Chloride  1,000 mls @ 100 mls/hr  01/10/20 11:30  01/12/20 16:33





  1/2 Normal Saline  IV   100 mls/hr





  ASDIR JOHN   Administration





     





     





     





     


 


Insulin Aspart  1 vial  01/10/20 07:00  01/12/20 16:32





  Novolog Vial Sliding Scale -  SQ   4 units





  [facility] JOHN   Administration





     





     





  Protocol   





     


 


Methadone HCl 40 mg/ Methadone  60 mg  01/11/20 10:45  01/12/20 06:10





  HCl 20 mg  PO   60 mg





  DAILY@0600 JOHN   Administration





     





     





     





     


 


Multivitamins/Minerals/Vitamin C  1 tab  01/11/20 10:00  01/12/20 09:44





  Tab-A-Vit -  PO   1 tab





  DAILY JOHN   Administration





     





     





     





     


 


Pantoprazole Sodium  40 mg  01/10/20 18:00  01/12/20 09:44





  Protonix -  PO   40 mg





  DAILY JOHN   Administration





     





     





     





     


 


Sodium Bicarbonate  325 mg  01/12/20 10:00  01/12/20 09:46





  Sodium Bicarbonate -  PO   325 mg





  BID JOHN   Administration





     





     





     





     


 


Venlafaxine HCl  50 mg  01/11/20 10:00  01/12/20 10:26





  Effexor -  PO   50 mg





  DAILY JOHN   Administration





     





     





     





     














Impression


1. ROJELIO


2. hx CKD


3. DM


4. non compliance


5. narcotic dependence


6. insomnia


7. hyperlipidemia


8. urinary retention s/p suprapubic cath


9. hx anemia


10 Hep C


11. nephrolithiasis


12. prostate ca with mets





Plan


- cont fluids


- repeat labs in am


- follow cultures, ordered yesterday


- possible progression of kidney disease


- will obtain office records on Monday


- crt starting to improve


- no indication for urgent hd

## 2020-01-12 NOTE — PN
Progress Note, Physician


Chief Complaint: 





abnormal labs


History of Present Illness: 





seen and examined, feels well, arm pain resolved. wants to go home





- Current Medication List


Current Medications: 


Active Medications





Acetaminophen (Tylenol -)  650 mg PO Q6H PRN


   PRN Reason: PAIN


   Last Admin: 01/12/20 10:26 Dose:  650 mg


Albuterol Sulfate (Ventolin 0.083% Nebulizer Soln -)  1 amp NEB Q4H PRN


   PRN Reason: SHORT OF BREATH/WHEEZING


Albuterol/Ipratropium (Duoneb -)  1 amp NEB RQID [facility]


   Last Admin: 01/12/20 14:45 Dose:  Not Given


Amlodipine Besylate (Norvasc -)  10 mg PO DAILY [facility]


   Last Admin: 01/12/20 09:44 Dose:  10 mg


Atorvastatin Calcium (Lipitor -)  10 mg PO HS [facility]


   Last Admin: 01/11/20 22:13 Dose:  10 mg


Docusate Sodium (Colace -)  300 mg PO HS [facility]


   Last Admin: 01/11/20 22:12 Dose:  100 mg


Ergocalciferol (Drisdol -)  50,000 unit PO Q7D [facility]


Heparin Sodium (Porcine) (Heparin -)  5,000 unit SQ TID [facility]


   Last Admin: 01/12/20 13:19 Dose:  5,000 unit


Sodium Chloride (1/2 Normal Saline)  1,000 mls @ 100 mls/hr IV ASDIR [facility]


   Last Admin: 01/12/20 12:02 Dose:  Not Given


Insulin Aspart (Novolog Vial Sliding Scale -)  1 vial SQ ACHS [facility]; Protocol


   Last Admin: 01/12/20 11:11 Dose:  Not Given


Methadone HCl 40 mg/ Methadone (HCl 20 mg)  60 mg PO DAILY@0600 [facility]


   Last Admin: 01/12/20 06:10 Dose:  60 mg


Multivitamins/Minerals/Vitamin C (Tab-A-Vit -)  1 tab PO DAILY [facility]


   Last Admin: 01/12/20 09:44 Dose:  1 tab


Pantoprazole Sodium (Protonix -)  40 mg PO DAILY [facility]


   Last Admin: 01/12/20 09:44 Dose:  40 mg


Sodium Bicarbonate (Sodium Bicarbonate -)  325 mg PO BID [facility]


   Last Admin: 01/12/20 09:46 Dose:  325 mg


Venlafaxine HCl (Effexor -)  50 mg PO DAILY [facility]


   Last Admin: 01/12/20 10:26 Dose:  50 mg











- Objective


Vital Signs: 


 Vital Signs











Temperature  98.1 F   01/12/20 13:49


 


Pulse Rate  67   01/12/20 13:49


 


Respiratory Rate  20   01/12/20 13:49


 


Blood Pressure  145/76   01/12/20 13:49


 


O2 Sat by Pulse Oximetry (%)  95   01/11/20 10:00











Constitutional: Yes: Well Nourished, No Distress, Calm


Cardiovascular: Yes: WNL, Regular Rate and Rhythm


Respiratory: Yes: WNL, Regular, CTA Bilaterally


Gastrointestinal: Yes: WNL, Normal Bowel Sounds, Soft, Other (+SP catheter)


Musculoskeletal: Yes: WNL


Extremities: Yes: WNL


Edema: No


Labs: 


 CBC, BMP





 01/11/20 15:25 





 01/12/20 06:15 











Problem List





- Problems


(1) Acute kidney failure


Code(s): N17.9 - ACUTE KIDNEY FAILURE, UNSPECIFIED   


Qualifiers: 


   Acute renal failure type: unspecified   Qualified Code(s): N17.9 - Acute 

kidney failure, unspecified   





(2) COPD (chronic obstructive pulmonary disease)


Code(s): J44.9 - CHRONIC OBSTRUCTIVE PULMONARY DISEASE, UNSPECIFIED   


Qualifiers: 


   COPD type: unspecified COPD   Qualified Code(s): J44.9 - Chronic obstructive 

pulmonary disease, unspecified   





(3) Chronic kidney disease


Code(s): N18.9 - CHRONIC KIDNEY DISEASE, UNSPECIFIED   


Qualifiers: 


   Chronic kidney disease stage: stage 3 (moderate)   Qualified Code(s): N18.3 

- Chronic kidney disease, stage 3 (moderate)   





(4) Chronic pain


Code(s): G89.29 - OTHER CHRONIC PAIN   





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


Code(s): E11.59 - TYPE 2 DIABETES MELLITUS WITH OTH CIRCULATORY COMPLICATIONS; 

I70.209 - UNSP ATHSCL NATIVE ARTERIES OF EXTREMITIES, UNSP EXTREMITY   





(6) HTN (hypertension)


Code(s): I10 - ESSENTIAL (PRIMARY) HYPERTENSION   


Qualifiers: 


   Hypertension type: essential hypertension   Qualified Code(s): I10 - 

Essential (primary) hypertension   





(7) History of prostate cancer


Code(s): Z85.46 - PERSONAL HISTORY OF MALIGNANT NEOPLASM OF PROSTATE   








Assessment/Plan





Assessment/Plan


63 yo M PMH of HTN, HLD, CKD, metastatic prostate Ca, COPD, prior CVA, Hep C 

who presented to the ED on instruction from Dr. Heck for elevated creatinine, 

acute renal failure.





1) Acute on chronic renal failure


-started on fluids, no improvement


-monitor i/os


-renal following





2) UTI


-awaiting urine cx


-ID called


-may need replacement of catheter





HTN


HLD


Met Prostate CA


COPD


CVA hx


Hep C


AOCD


Mood d/o


DM2





-cw current mnmgt

## 2020-01-13 LAB
ANION GAP SERPL CALC-SCNC: 6 MMOL/L (ref 8–16)
BASOPHILS # BLD: 1 % (ref 0–2)
BUN SERPL-MCNC: 43.4 MG/DL (ref 7–18)
CALCIUM SERPL-MCNC: 8.6 MG/DL (ref 8.5–10.1)
CHLORIDE SERPL-SCNC: 110 MMOL/L (ref 98–107)
CO2 SERPL-SCNC: 21 MMOL/L (ref 21–32)
CREAT SERPL-MCNC: 4.1 MG/DL (ref 0.55–1.3)
DEPRECATED RDW RBC AUTO: 15 % (ref 11.9–15.9)
EOSINOPHIL # BLD: 0.4 % (ref 0–4.5)
GLUCOSE SERPL-MCNC: 213 MG/DL (ref 74–106)
HCT VFR BLD CALC: 27.4 % (ref 35.4–49)
HGB BLD-MCNC: 9 GM/DL (ref 11.7–16.9)
LYMPHOCYTES # BLD: 16 % (ref 8–40)
MCH RBC QN AUTO: 28.3 PG (ref 25.7–33.7)
MCHC RBC AUTO-ENTMCNC: 32.8 G/DL (ref 32–35.9)
MCV RBC: 86.3 FL (ref 80–96)
MONOCYTES # BLD AUTO: 4 % (ref 3.8–10.2)
NEUTROPHILS # BLD: 78.6 % (ref 42.8–82.8)
PLATELET # BLD AUTO: 126 K/MM3 (ref 134–434)
PMV BLD: 9.8 FL (ref 7.5–11.1)
POTASSIUM SERPLBLD-SCNC: 4.3 MMOL/L (ref 3.5–5.1)
RBC # BLD AUTO: 3.17 M/MM3 (ref 4–5.6)
SODIUM SERPL-SCNC: 137 MMOL/L (ref 136–145)
WBC # BLD AUTO: 4.7 K/MM3 (ref 4–10)

## 2020-01-13 RX ADMIN — INSULIN ASPART SCH: 100 INJECTION, SOLUTION INTRAVENOUS; SUBCUTANEOUS at 16:52

## 2020-01-13 RX ADMIN — DOCUSATE SODIUM SCH MG: 100 CAPSULE, LIQUID FILLED ORAL at 21:41

## 2020-01-13 RX ADMIN — INSULIN ASPART SCH UNITS: 100 INJECTION, SOLUTION INTRAVENOUS; SUBCUTANEOUS at 12:16

## 2020-01-13 RX ADMIN — SODIUM BICARBONATE TAB 325 MG SCH MG: 325 TAB at 09:08

## 2020-01-13 RX ADMIN — SODIUM BICARBONATE TAB 325 MG SCH MG: 325 TAB at 21:44

## 2020-01-13 RX ADMIN — INSULIN ASPART SCH: 100 INJECTION, SOLUTION INTRAVENOUS; SUBCUTANEOUS at 21:44

## 2020-01-13 RX ADMIN — SODIUM CHLORIDE SCH MLS/HR: 4.5 INJECTION, SOLUTION INTRAVENOUS at 09:17

## 2020-01-13 RX ADMIN — IPRATROPIUM BROMIDE AND ALBUTEROL SULFATE SCH AMP: .5; 3 SOLUTION RESPIRATORY (INHALATION) at 16:12

## 2020-01-13 RX ADMIN — VENLAFAXINE HYDROCHLORIDE SCH MG: 25 TABLET ORAL at 09:08

## 2020-01-13 RX ADMIN — ATORVASTATIN CALCIUM SCH MG: 10 TABLET, FILM COATED ORAL at 21:43

## 2020-01-13 RX ADMIN — PANTOPRAZOLE SODIUM SCH MG: 40 TABLET, DELAYED RELEASE ORAL at 09:07

## 2020-01-13 RX ADMIN — SODIUM CHLORIDE SCH: 4.5 INJECTION, SOLUTION INTRAVENOUS at 12:13

## 2020-01-13 RX ADMIN — HEPARIN SODIUM SCH UNIT: 5000 INJECTION, SOLUTION INTRAVENOUS; SUBCUTANEOUS at 21:43

## 2020-01-13 RX ADMIN — AMLODIPINE BESYLATE SCH MG: 10 TABLET ORAL at 09:07

## 2020-01-13 RX ADMIN — METHADONE HYDROCHLORIDE SCH: 40 TABLET ORAL at 06:13

## 2020-01-13 RX ADMIN — ACETAMINOPHEN PRN MG: 325 TABLET ORAL at 18:41

## 2020-01-13 RX ADMIN — HEPARIN SODIUM SCH UNIT: 5000 INJECTION, SOLUTION INTRAVENOUS; SUBCUTANEOUS at 15:00

## 2020-01-13 RX ADMIN — Medication SCH MG: at 21:43

## 2020-01-13 RX ADMIN — INSULIN ASPART SCH: 100 INJECTION, SOLUTION INTRAVENOUS; SUBCUTANEOUS at 06:13

## 2020-01-13 RX ADMIN — IPRATROPIUM BROMIDE AND ALBUTEROL SULFATE SCH AMP: .5; 3 SOLUTION RESPIRATORY (INHALATION) at 11:52

## 2020-01-13 RX ADMIN — ACETAMINOPHEN PRN MG: 325 TABLET ORAL at 12:14

## 2020-01-13 RX ADMIN — IPRATROPIUM BROMIDE AND ALBUTEROL SULFATE SCH AMP: .5; 3 SOLUTION RESPIRATORY (INHALATION) at 20:30

## 2020-01-13 RX ADMIN — MULTIVITAMIN TABLET SCH TAB: TABLET at 09:07

## 2020-01-13 RX ADMIN — IPRATROPIUM BROMIDE AND ALBUTEROL SULFATE SCH AMP: .5; 3 SOLUTION RESPIRATORY (INHALATION) at 00:05

## 2020-01-13 RX ADMIN — IPRATROPIUM BROMIDE AND ALBUTEROL SULFATE SCH: .5; 3 SOLUTION RESPIRATORY (INHALATION) at 08:47

## 2020-01-13 RX ADMIN — HEPARIN SODIUM SCH: 5000 INJECTION, SOLUTION INTRAVENOUS; SUBCUTANEOUS at 06:13

## 2020-01-13 NOTE — PN
Progress Note, Physician


History of Present Illness: 


Reports decreased appetite, denies chest pain or dyspnea.





- Current Medication List


Current Medications: 


Active Medications





Acetaminophen (Tylenol -)  650 mg PO Q6H PRN


   PRN Reason: PAIN


   Last Admin: 01/12/20 21:00 Dose:  650 mg


Albuterol Sulfate (Ventolin 0.083% Nebulizer Soln -)  1 amp NEB Q4H PRN


   PRN Reason: SHORT OF BREATH/WHEEZING


   Last Admin: 01/12/20 15:00 Dose:  1 amp


Albuterol/Ipratropium (Duoneb -)  1 amp NEB RQID [facility]


   Last Admin: 01/13/20 08:47 Dose:  Not Given


Amlodipine Besylate (Norvasc -)  10 mg PO DAILY [facility]


   Last Admin: 01/12/20 09:44 Dose:  10 mg


Atorvastatin Calcium (Lipitor -)  10 mg PO HS [facility]


   Last Admin: 01/12/20 22:04 Dose:  10 mg


Docusate Sodium (Colace -)  300 mg PO HS [facility]


   Last Admin: 01/12/20 22:04 Dose:  300 mg


Ergocalciferol (Drisdol -)  50,000 unit PO We [facility]


Heparin Sodium (Porcine) (Heparin -)  5,000 unit SQ TID [facility]


   Last Admin: 01/13/20 06:13 Dose:  Not Given


Sodium Chloride (1/2 Normal Saline)  1,000 mls @ 100 mls/hr IV ASDIR [facility]


   Last Admin: 01/12/20 16:33 Dose:  100 mls/hr


Insulin Aspart (Novolog Vial Sliding Scale -)  1 vial SQ ACHS [facility]; Protocol


   Last Admin: 01/13/20 06:13 Dose:  Not Given


Methadone HCl 40 mg/ Methadone (HCl 20 mg)  60 mg PO DAILY@0600 [facility]


   Last Admin: 01/13/20 06:13 Dose:  Not Given


Multivitamins/Minerals/Vitamin C (Tab-A-Vit -)  1 tab PO DAILY [facility]


   Last Admin: 01/12/20 09:44 Dose:  1 tab


Pantoprazole Sodium (Protonix -)  40 mg PO DAILY [facility]


   Last Admin: 01/12/20 09:44 Dose:  40 mg


Sodium Bicarbonate (Sodium Bicarbonate -)  325 mg PO BID [facility]


   Last Admin: 01/12/20 22:05 Dose:  325 mg


Venlafaxine HCl (Effexor -)  50 mg PO DAILY [facility]


   Last Admin: 01/12/20 10:26 Dose:  50 mg











- Objective


Vital Signs: 


 Vital Signs











Temperature  98.3 F   01/13/20 06:08


 


Pulse Rate  85   01/13/20 06:08


 


Respiratory Rate  20   01/13/20 06:08


 


Blood Pressure  163/99   01/13/20 06:08


 


O2 Sat by Pulse Oximetry (%)  98   01/12/20 21:00











Constitutional: Yes: No Distress, Calm, Thin


Neck: Yes: Supple


Cardiovascular: Yes: Regular Rate and Rhythm


Respiratory: Yes: Regular, CTA Bilaterally


Gastrointestinal: Yes: Soft, Hypoactive Bowel Sounds


Edema: No


Labs: 


 CBC, BMP





 01/11/20 15:25 





 01/12/20 06:15 











Problem List





- Problems


(1) Acute kidney failure


Code(s): N17.9 - ACUTE KIDNEY FAILURE, UNSPECIFIED   


Qualifiers: 


   Acute renal failure type: unspecified   Qualified Code(s): N17.9 - Acute 

kidney failure, unspecified   





(2) Chronic kidney disease


Code(s): N18.9 - CHRONIC KIDNEY DISEASE, UNSPECIFIED   


Qualifiers: 


   Chronic kidney disease stage: stage 3 (moderate)   Qualified Code(s): N18.3 

- Chronic kidney disease, stage 3 (moderate)   





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


Code(s): E11.59 - TYPE 2 DIABETES MELLITUS WITH OTH CIRCULATORY COMPLICATIONS; 

I70.209 - UNSP ATHSCL NATIVE ARTERIES OF EXTREMITIES, UNSP EXTREMITY   





(4) HTN (hypertension)


Code(s): I10 - ESSENTIAL (PRIMARY) HYPERTENSION   


Qualifiers: 


   Hypertension type: essential hypertension   Qualified Code(s): I10 - 

Essential (primary) hypertension   





Assessment/Plan





1. Acute on CKD


2. DM


3. non compliance


4. narcotic dependence


5. insomnia


6. hyperlipidemia


7. urinary retention s/p suprapubic cath


8. hx anemia


9 Hep C


10. nephrolithiasis


11. prostate ca with mets


12. elevated BNP - difficult to interpret in the setting of ROJELIO


13. TNIs nl


14. Clinicaly not in CHF


15. Uncontrolled HTN











PLan:1. Restart BP meds including Norvasc 10 qd, Lipitor 10 qd, 


2. IVF as per renal


3. DVT and GI prophylaxis


4. BD, O2 as needed

## 2020-01-13 NOTE — PN
Progress Note, Physician


History of Present Illness: 





Pt seen and examined at bedside. He is awake and alert. he denies shortness of 

breath. 





- Current Medication List


Current Medications: 


Active Medications





Acetaminophen (Tylenol -)  650 mg PO Q6H PRN


   PRN Reason: PAIN


   Last Admin: 01/13/20 12:14 Dose:  650 mg


Albuterol Sulfate (Ventolin 0.083% Nebulizer Soln -)  1 amp NEB Q4H PRN


   PRN Reason: SHORT OF BREATH/WHEEZING


   Last Admin: 01/12/20 15:00 Dose:  1 amp


Albuterol/Ipratropium (Duoneb -)  1 amp NEB RQID [facility]


   Last Admin: 01/13/20 11:52 Dose:  1 amp


Amlodipine Besylate (Norvasc -)  10 mg PO DAILY [facility]


   Last Admin: 01/13/20 09:07 Dose:  10 mg


Atorvastatin Calcium (Lipitor -)  10 mg PO HS [facility]


   Last Admin: 01/12/20 22:04 Dose:  10 mg


Docusate Sodium (Colace -)  300 mg PO [facility]


   Last Admin: 01/12/20 22:04 Dose:  300 mg


Ergocalciferol (Drisdol -)  50,000 unit PO We [facility]


Heparin Sodium (Porcine) (Heparin -)  5,000 unit SQ TID [facility]


   Last Admin: 01/13/20 06:13 Dose:  Not Given


Sodium Chloride (1/2 Normal Saline)  1,000 mls @ 100 mls/hr IV ASDIR [facility]


   Last Admin: 01/13/20 12:13 Dose:  Not Given


Insulin Aspart (Novolog Vial Sliding Scale -)  1 vial SQ ACHS [facility]; Protocol


   Last Admin: 01/13/20 12:16 Dose:  4 units


Melatonin (Melatonin)  3 mg PO [facility]


Methadone HCl 40 mg/ Methadone (HCl 20 mg)  60 mg PO DAILY@0600 [facility]


   Last Admin: 01/13/20 06:13 Dose:  Not Given


Multivitamins/Minerals/Vitamin C (Tab-A-Vit -)  1 tab PO DAILY [facility]


   Last Admin: 01/13/20 09:07 Dose:  1 tab


Ondansetron HCl (Zofran Injection)  4 mg IVPUSH Q4H PRN


   PRN Reason: NAUSEA AND/OR VOMITING


   Last Admin: 01/13/20 13:44 Dose:  4 mg


Pantoprazole Sodium (Protonix -)  40 mg PO DAILY [facility]


   Last Admin: 01/13/20 09:07 Dose:  40 mg


Sodium Bicarbonate (Sodium Bicarbonate -)  325 mg PO BID [facility]


   Last Admin: 01/13/20 09:08 Dose:  325 mg


Venlafaxine HCl (Effexor -)  50 mg PO DAILY [facility]


   Last Admin: 01/13/20 09:08 Dose:  50 mg











- Objective


Vital Signs: 


 Vital Signs











Temperature  100.0 F H  01/13/20 10:00


 


Pulse Rate  77   01/13/20 10:00


 


Respiratory Rate  20   01/13/20 10:00


 


Blood Pressure  189/93 H  01/13/20 10:00


 


O2 Sat by Pulse Oximetry (%)  97   01/13/20 09:00











Constitutional: Yes: Calm


Eyes: Yes: Conjunctiva Clear


HENT: Yes: Atraumatic


Neck: Yes: Supple


Cardiovascular: Yes: S1, S2


Respiratory: Yes: CTA Bilaterally


Gastrointestinal: Yes: Soft


Genitourinary: Yes: Other (suprapubic)


Edema: No


Neurological: Yes: Oriented


Labs: 


 CBC, BMP





 01/13/20 11:20 





 01/13/20 11:20 











Problem List





- Problems


(1) Acute kidney failure


Code(s): N17.9 - ACUTE KIDNEY FAILURE, UNSPECIFIED   


Qualifiers: 


   Acute renal failure type: unspecified   Qualified Code(s): N17.9 - Acute 

kidney failure, unspecified   





Assessment/Plan


 Current Medications











Generic Name Dose Route Start Last Admin





  Trade Name Freq  PRN Reason Stop Dose Admin


 


Acetaminophen  650 mg  01/10/20 14:43  01/13/20 12:14





  Tylenol -  PO   650 mg





  Q6H PRN   Administration





  PAIN   





     





     





     


 


Albuterol Sulfate  1 amp  01/10/20 17:48  01/12/20 15:00





  Ventolin 0.083% Nebulizer Soln -  NEB   1 amp





  Q4H PRN   Administration





  SHORT OF BREATH/WHEEZING   





     





     





     


 


Albuterol/Ipratropium  1 amp  01/10/20 20:00  01/13/20 11:52





  Duoneb -  NEB   1 amp





  RQID [facility]   Administration





     





     





     





     


 


Amlodipine Besylate  10 mg  01/11/20 10:00  01/13/20 09:07





  Norvasc -  PO   10 mg





  DAILY [facility]   Administration





     





     





     





     


 


Atorvastatin Calcium  10 mg  01/10/20 22:00  01/12/20 22:04





  Lipitor -  PO   10 mg





  HS [facility]   Administration





     





     





     





     


 


Docusate Sodium  300 mg  01/10/20 22:00  01/12/20 22:04





  Colace -  PO   300 mg





  HS [facility]   Administration





     





     





     





     


 


Ergocalciferol  50,000 unit  01/15/20 10:00  





  Drisdol -  PO   





  We [facility]   





     





     





     





     


 


Heparin Sodium (Porcine)  5,000 unit  01/10/20 06:00  01/13/20 06:13





  Heparin -  SQ   Not Given





  TID [facility]   





     





     





     





     


 


Sodium Chloride  1,000 mls @ 100 mls/hr  01/10/20 11:30  01/13/20 12:13





  1/2 Normal Saline  IV   Not Given





  ASDIR [facility]   





     





     





     





     


 


Insulin Aspart  1 vial  01/10/20 07:00  01/13/20 12:16





  Novolog Vial Sliding Scale -  SQ   4 units





  [facility]   Administration





     





     





  Protocol   





     


 


Melatonin  3 mg  01/13/20 22:00  





  Melatonin  PO   





  HS [facility]   





     





     





     





     


 


Methadone HCl 40 mg/ Methadone  60 mg  01/11/20 10:45  01/13/20 06:13





  HCl 20 mg  PO   Not Given





  DAILY@0600 [facility]   





     





     





     





     


 


Multivitamins/Minerals/Vitamin C  1 tab  01/11/20 10:00  01/13/20 09:07





  Tab-A-Vit -  PO   1 tab





  DAILY [facility]   Administration





     





     





     





     


 


Ondansetron HCl  4 mg  01/13/20 09:35  01/13/20 13:44





  Zofran Injection  IVPUSH   4 mg





  Q4H PRN   Administration





  NAUSEA AND/OR VOMITING   





     





     





     


 


Pantoprazole Sodium  40 mg  01/10/20 18:00  01/13/20 09:07





  Protonix -  PO   40 mg





  DAILY [facility]   Administration





     





     





     





     


 


Sodium Bicarbonate  325 mg  01/12/20 10:00  01/13/20 09:08





  Sodium Bicarbonate -  PO   325 mg





  BID [facility]   Administration





     





     





     





     


 


Venlafaxine HCl  50 mg  01/11/20 10:00  01/13/20 09:08





  Effexor -  PO   50 mg





  DAILY [facility]   Administration





     





     





     





     














Impression


1. ROJELIO


2. hx CKD


3. DM


4. non compliance


5. narcotic dependence


6. insomnia


7. hyperlipidemia


8. urinary retention s/p suprapubic cath


9. hx anemia


10 Hep C


11. nephrolithiasis


12. prostate ca with mets





Plan


- renal function continues to improve


- repeat labs in am


- cont with hydration


- no indication for HD


- avoid nsaids


- follow urine cultures

## 2020-01-13 NOTE — PN
Progress Note (short form)





- Note


Progress Note: 





Patient seen and examined





Denies any complaints





 Last Vital Signs











Temp Pulse Resp BP Pulse Ox


 


 98.6 F   70   20   131/72   97 


 


 01/13/20 14:48  01/13/20 14:48  01/13/20 14:48  01/13/20 14:48  01/13/20 09:00








Cor: RSR, No murmurs, No gallops


Lungs: Clear to P&A


Abd: Soft, Normal bowel sounds, No organomegaly


Ext:No significant edema








Labs/Meds reviewed





A/P





65 yo M PMH of HTN, HLD, CKD, metastatic prostate Ca,COPD, prior CVA, Hep C 

presents to ED as blood tests done as outpatient suggestive of acute renal 

failure.


On IV hdration


Nephrology following


will check CT c/a/p noncontrast

## 2020-01-13 NOTE — PN
Physical Exam: 


SUBJECTIVE: Patient seen and examined. He complains of nausea and says he has 

had no appetite for the last 3 days.








OBJECTIVE:





 Vital Signs











 Period  Temp  Pulse  Resp  BP Sys/Roland  Pulse Ox


 


 Last 24 Hr  97.8 F-98.3 F  67-85  20-20  142-163/68-99  98











GENERAL: The patient is awake, alert, and fully oriented, in no acute distress.


LUNGS: Breath sounds equal, clear to auscultation bilaterally, no wheezes, no 

crackles, no accessory muscle use. 


HEART: Regular rate and rhythm, S1, S2 without murmur, rub or gallop.


ABDOMEN: Soft, nontender, nondistended, normoactive bowel sounds, no guarding, 

no rebound, no hepatosplenomegaly, no masses.


EXTREMITIES: 2+ pulses, warm, well-perfused, no edema. 











 Laboratory Results - last 24 hr











  01/12/20 01/12/20





  11:09 16:30


 


POC Glucometer  182  205








Active Medications











Generic Name Dose Route Start Last Admin





  Trade Name Freq  PRN Reason Stop Dose Admin


 


Acetaminophen  650 mg  01/10/20 14:43  01/12/20 21:00





  Tylenol -  PO   650 mg





  Q6H PRN   Administration





  PAIN   





     





     





     


 


Albuterol Sulfate  1 amp  01/10/20 17:48  01/12/20 15:00





  Ventolin 0.083% Nebulizer Soln -  NEB   1 amp





  Q4H PRN   Administration





  SHORT OF BREATH/WHEEZING   





     





     





     


 


Albuterol/Ipratropium  1 amp  01/10/20 20:00  01/13/20 08:47





  Duoneb -  NEB   Not Given





  RQID [facility]   





     





     





     





     


 


Amlodipine Besylate  10 mg  01/11/20 10:00  01/13/20 09:07





  Norvasc -  PO   10 mg





  DAILY [facility]   Administration





     





     





     





     


 


Atorvastatin Calcium  10 mg  01/10/20 22:00  01/12/20 22:04





  Lipitor -  PO   10 mg





  HS [facility]   Administration





     





     





     





     


 


Docusate Sodium  300 mg  01/10/20 22:00  01/12/20 22:04





  Colace -  PO   300 mg





  HS [facility]   Administration





     





     





     





     


 


Ergocalciferol  50,000 unit  01/15/20 10:00  





  Drisdol -  PO   





  We [facility]   





     





     





     





     


 


Heparin Sodium (Porcine)  5,000 unit  01/10/20 06:00  01/13/20 06:13





  Heparin -  SQ   Not Given





  TID [facility]   





     





     





     





     


 


Sodium Chloride  1,000 mls @ 100 mls/hr  01/10/20 11:30  01/13/20 09:17





  1/2 Normal Saline  IV   100 mls/hr





  ASDIR [facility]   Administration





     





     





     





     


 


Insulin Aspart  1 vial  01/10/20 07:00  01/13/20 06:13





  Novolog Vial Sliding Scale -  SQ   Not Given





  [facility]   





     





     





  Protocol   





     


 


Methadone HCl 40 mg/ Methadone  60 mg  01/11/20 10:45  01/13/20 06:13





  HCl 20 mg  PO   Not Given





  DAILY@0600 [facility]   





     





     





     





     


 


Multivitamins/Minerals/Vitamin C  1 tab  01/11/20 10:00  01/13/20 09:07





  Tab-A-Vit -  PO   1 tab





  DAILY [facility]   Administration





     





     





     





     


 


Pantoprazole Sodium  40 mg  01/10/20 18:00  01/13/20 09:07





  Protonix -  PO   40 mg





  DAILY [facility]   Administration





     





     





     





     


 


Sodium Bicarbonate  325 mg  01/12/20 10:00  01/13/20 09:08





  Sodium Bicarbonate -  PO   325 mg





  BID JOHN   Administration





     





     





     





     


 


Venlafaxine HCl  50 mg  01/11/20 10:00  01/13/20 09:08





  Effexor -  PO   50 mg





  DAILY JOHN   Administration





     





     





     





     











ASSESSMENT/PLAN:


This is a 64 year old man with a history of HTN, hyperlipidemia, type 2 DM, 

stage 3 CKD, COPD, metastatic prostate cancer, CVA, hepatitis C, anemia, opioid 

dependence who presented to the ED after being found to have creatinine 5.





1. Acute kidney injury


   - Improving


   - Continue IV fluid


2. Possible UTI


   - Urine culture not yet sent


3. HTN


   - Continue Norvasc


4. Hyperlipidemia


   - Continue Lipitor


5. Type 2 DM


   - Continue Novolog sliding scale


6. COPD


   - Stable


   - Continue DuoNeb


7. Stage 3 CKD


8. History of CVA


9. Metastatic prostate cancer


10. Hepatitis C


11. Anemia


   - Hemoglobin stable


12. Opioid dependence


   - Continue Methadone





Visit type





- Emergency Visit


Emergency Visit: Yes


ED Registration Date: 01/09/20


Care time: The patient presented to the Emergency Department on the above date 

and was hospitalized for further evaluation of their emergent condition.





- New Patient


This patient is new to me today: Yes


Date on this admission: 01/13/20





- Critical Care


Critical Care patient: No





- Discharge Referral


Referred to [facility] No

## 2020-01-14 LAB
ALBUMIN SERPL-MCNC: 3.4 G/DL (ref 3.4–5)
ALP SERPL-CCNC: 126 U/L (ref 45–117)
ALT SERPL-CCNC: 13 U/L (ref 13–61)
ANION GAP SERPL CALC-SCNC: 7 MMOL/L (ref 8–16)
AST SERPL-CCNC: 13 U/L (ref 15–37)
BASOPHILS # BLD: 1.7 % (ref 0–2)
BILIRUB SERPL-MCNC: 0.3 MG/DL (ref 0.2–1)
BUN SERPL-MCNC: 46.2 MG/DL (ref 7–18)
CALCIUM SERPL-MCNC: 9.1 MG/DL (ref 8.5–10.1)
CHLORIDE SERPL-SCNC: 111 MMOL/L (ref 98–107)
CO2 SERPL-SCNC: 21 MMOL/L (ref 21–32)
CREAT SERPL-MCNC: 4.3 MG/DL (ref 0.55–1.3)
DEPRECATED RDW RBC AUTO: 14.6 % (ref 11.9–15.9)
EOSINOPHIL # BLD: 1.4 % (ref 0–4.5)
EPO SERPL-ACNC: 5.7 MIU/ML (ref 2.6–18.5)
GLUCOSE SERPL-MCNC: 119 MG/DL (ref 74–106)
HCT VFR BLD CALC: 27.4 % (ref 35.4–49)
HGB BLD-MCNC: 8.9 GM/DL (ref 11.7–16.9)
LYMPHOCYTES # BLD: 29 % (ref 8–40)
MCH RBC QN AUTO: 27.9 PG (ref 25.7–33.7)
MCHC RBC AUTO-ENTMCNC: 32.5 G/DL (ref 32–35.9)
MCV RBC: 85.9 FL (ref 80–96)
MONOCYTES # BLD AUTO: 8.7 % (ref 3.8–10.2)
NEUTROPHILS # BLD: 59.2 % (ref 42.8–82.8)
PLATELET # BLD AUTO: 131 K/MM3 (ref 134–434)
PMV BLD: 9 FL (ref 7.5–11.1)
POTASSIUM SERPLBLD-SCNC: 4.6 MMOL/L (ref 3.5–5.1)
PROT SERPL-MCNC: 6.6 G/DL (ref 6.4–8.2)
RBC # BLD AUTO: 3.19 M/MM3 (ref 4–5.6)
SODIUM SERPL-SCNC: 139 MMOL/L (ref 136–145)
WBC # BLD AUTO: 3.9 K/MM3 (ref 4–10)

## 2020-01-14 RX ADMIN — HEPARIN SODIUM SCH UNIT: 5000 INJECTION, SOLUTION INTRAVENOUS; SUBCUTANEOUS at 15:24

## 2020-01-14 RX ADMIN — AMLODIPINE BESYLATE SCH MG: 10 TABLET ORAL at 12:11

## 2020-01-14 RX ADMIN — INSULIN ASPART SCH: 100 INJECTION, SOLUTION INTRAVENOUS; SUBCUTANEOUS at 22:28

## 2020-01-14 RX ADMIN — INSULIN ASPART SCH: 100 INJECTION, SOLUTION INTRAVENOUS; SUBCUTANEOUS at 12:16

## 2020-01-14 RX ADMIN — MULTIVITAMIN TABLET SCH TAB: TABLET at 12:10

## 2020-01-14 RX ADMIN — IPRATROPIUM BROMIDE AND ALBUTEROL SULFATE SCH AMP: .5; 3 SOLUTION RESPIRATORY (INHALATION) at 08:21

## 2020-01-14 RX ADMIN — SODIUM BICARBONATE TAB 325 MG SCH MG: 325 TAB at 12:11

## 2020-01-14 RX ADMIN — ATORVASTATIN CALCIUM SCH MG: 10 TABLET, FILM COATED ORAL at 22:27

## 2020-01-14 RX ADMIN — SODIUM CHLORIDE SCH: 4.5 INJECTION, SOLUTION INTRAVENOUS at 12:17

## 2020-01-14 RX ADMIN — Medication SCH MG: at 22:27

## 2020-01-14 RX ADMIN — DOCUSATE SODIUM SCH MG: 100 CAPSULE, LIQUID FILLED ORAL at 22:27

## 2020-01-14 RX ADMIN — VENLAFAXINE HYDROCHLORIDE SCH MG: 25 TABLET ORAL at 12:11

## 2020-01-14 RX ADMIN — SODIUM BICARBONATE TAB 325 MG SCH MG: 325 TAB at 22:28

## 2020-01-14 RX ADMIN — IPRATROPIUM BROMIDE AND ALBUTEROL SULFATE SCH: .5; 3 SOLUTION RESPIRATORY (INHALATION) at 20:10

## 2020-01-14 RX ADMIN — IPRATROPIUM BROMIDE AND ALBUTEROL SULFATE SCH: .5; 3 SOLUTION RESPIRATORY (INHALATION) at 16:32

## 2020-01-14 RX ADMIN — METHADONE HYDROCHLORIDE SCH MG: 40 TABLET ORAL at 05:33

## 2020-01-14 RX ADMIN — INSULIN ASPART SCH: 100 INJECTION, SOLUTION INTRAVENOUS; SUBCUTANEOUS at 06:00

## 2020-01-14 RX ADMIN — INSULIN ASPART SCH: 100 INJECTION, SOLUTION INTRAVENOUS; SUBCUTANEOUS at 16:35

## 2020-01-14 RX ADMIN — HEPARIN SODIUM SCH UNIT: 5000 INJECTION, SOLUTION INTRAVENOUS; SUBCUTANEOUS at 05:33

## 2020-01-14 RX ADMIN — PANTOPRAZOLE SODIUM SCH MG: 40 TABLET, DELAYED RELEASE ORAL at 12:10

## 2020-01-14 RX ADMIN — SODIUM CHLORIDE SCH MLS/HR: 4.5 INJECTION, SOLUTION INTRAVENOUS at 17:56

## 2020-01-14 RX ADMIN — HEPARIN SODIUM SCH UNIT: 5000 INJECTION, SOLUTION INTRAVENOUS; SUBCUTANEOUS at 22:27

## 2020-01-14 RX ADMIN — IPRATROPIUM BROMIDE AND ALBUTEROL SULFATE SCH: .5; 3 SOLUTION RESPIRATORY (INHALATION) at 12:14

## 2020-01-14 NOTE — PN
Progress Note, Physician


Chief Complaint: 





Events noted


Not in distress


History of Present Illness: 





Patient was seen and examined. Awake and alert. Chart was reviewed


Denies chest pain, SOB or palpitations





- Current Medication List


Current Medications: 


Active Medications





Acetaminophen (Tylenol -)  650 mg PO Q6H PRN


   PRN Reason: PAIN


   Last Admin: 01/13/20 18:41 Dose:  650 mg


Albuterol Sulfate (Ventolin 0.083% Nebulizer Soln -)  1 amp NEB Q4H PRN


   PRN Reason: SHORT OF BREATH/WHEEZING


   Last Admin: 01/12/20 15:00 Dose:  1 amp


Albuterol/Ipratropium (Duoneb -)  1 amp NEB RQID [facility]


   Last Admin: 01/14/20 12:14 Dose:  Not Given


Amlodipine Besylate (Norvasc -)  10 mg PO DAILY [facility]


   Last Admin: 01/14/20 12:11 Dose:  10 mg


Atorvastatin Calcium (Lipitor -)  10 mg PO [facility]


   Last Admin: 01/13/20 21:43 Dose:  10 mg


Docusate Sodium (Colace -)  300 mg PO [facility]


   Last Admin: 01/13/20 21:41 Dose:  300 mg


Ergocalciferol (Drisdol -)  50,000 unit PO We [facility]


Heparin Sodium (Porcine) (Heparin -)  5,000 unit SQ TID [facility]


   Last Admin: 01/14/20 05:33 Dose:  5,000 unit


Sodium Chloride (1/2 Normal Saline)  1,000 mls @ 100 mls/hr IV ASDIR [facility]


   Last Admin: 01/14/20 12:17 Dose:  Not Given


Insulin Aspart (Novolog Vial Sliding Scale -)  1 vial SQ ACHS [facility]; Protocol


   Last Admin: 01/14/20 12:16 Dose:  Not Given


Melatonin (Melatonin)  3 mg PO [facility]


   Last Admin: 01/13/20 21:43 Dose:  3 mg


Methadone HCl 40 mg/ Methadone (HCl 20 mg)  60 mg PO DAILY@0600 [facility]


   Last Admin: 01/14/20 05:33 Dose:  60 mg


Multivitamins/Minerals/Vitamin C (Tab-A-Vit -)  1 tab PO DAILY [facility]


   Last Admin: 01/14/20 12:10 Dose:  1 tab


Ondansetron HCl (Zofran Injection)  4 mg IVPUSH Q4H PRN


   PRN Reason: NAUSEA AND/OR VOMITING


   Last Admin: 01/13/20 13:44 Dose:  4 mg


Pantoprazole Sodium (Protonix -)  40 mg PO DAILY [facility]


   Last Admin: 01/14/20 12:10 Dose:  40 mg


Sodium Bicarbonate (Sodium Bicarbonate -)  325 mg PO BID [facility]


   Last Admin: 01/14/20 12:11 Dose:  325 mg


Venlafaxine HCl (Effexor -)  50 mg PO DAILY [facility]


   Last Admin: 01/14/20 12:11 Dose:  50 mg











- Objective


Vital Signs: 


 Vital Signs











Temperature  98.1 F   01/14/20 06:08


 


Pulse Rate  70   01/14/20 06:08


 


Respiratory Rate  20 01/14/20 06:08


 


Blood Pressure  157/74   01/14/20 06:08


 


O2 Sat by Pulse Oximetry (%)  99   01/13/20 21:00











HENT: Yes: Atraumatic


Neck: Yes: Supple


Cardiovascular: Yes: Regular Rate and Rhythm, S1, S2


Respiratory: Yes: CTA Bilaterally


Gastrointestinal: Yes: Normal Bowel Sounds, Soft.  No: Tenderness


Edema: No


Labs: 


 CBC, BMP





 01/14/20 07:58 





 01/14/20 07:58 











Problem List





- Problems


(1) Acute kidney failure


Code(s): N17.9 - ACUTE KIDNEY FAILURE, UNSPECIFIED   


Qualifiers: 


   Acute renal failure type: unspecified   Qualified Code(s): N17.9 - Acute 

kidney failure, unspecified   





(2) Anemia


Code(s): D64.9 - ANEMIA, UNSPECIFIED   


Qualifiers: 


   Anemia type: unspecified type   Qualified Code(s): D64.9 - Anemia, 

unspecified   





(3) COPD (chronic obstructive pulmonary disease)


Code(s): J44.9 - CHRONIC OBSTRUCTIVE PULMONARY DISEASE, UNSPECIFIED   


Qualifiers: 


   COPD type: unspecified COPD   Qualified Code(s): J44.9 - Chronic obstructive 

pulmonary disease, unspecified   





(4) Chronic kidney disease


Code(s): N18.9 - CHRONIC KIDNEY DISEASE, UNSPECIFIED   


Qualifiers: 


   Chronic kidney disease stage: stage 3 (moderate)   Qualified Code(s): N18.3 

- Chronic kidney disease, stage 3 (moderate)   





(5) HTN (hypertension)


Code(s): I10 - ESSENTIAL (PRIMARY) HYPERTENSION   


Qualifiers: 


   Hypertension type: essential hypertension   Qualified Code(s): I10 - 

Essential (primary) hypertension   





(6) History of prostate cancer


Code(s): Z85.46 - PERSONAL HISTORY OF MALIGNANT NEOPLASM OF PROSTATE   





(7) Nicotine dependence


Code(s): F17.200 - NICOTINE DEPENDENCE, UNSPECIFIED, UNCOMPLICATED   








Assessment/Plan





1. Acute on CKD


2. DM


3. Non compliance


4. Narcotic dependence


5. Insomnia


6. hyperlipidemia


7. Urinary retention s/p suprapubic cath


8. History of anemia


9. Hep C


10. Nephrolithiasis


11. Prostate ca with mets


12. Elevated BNP 


13. HTN





PLAN:


1. Continue Norvasc 10 mg QD and Lipitor 10 mg QHS 


2. IVF 


3. DVT and GI prophylaxis


4. Bronchodilator and O2 as needed





Compa Bolton MD

## 2020-01-14 NOTE — PN
Progress Note, Physician


History of Present Illness: 





Pt seen and examined at bedside. He is asking to go home. He denies shortness 

of breath. 





- Current Medication List


Current Medications: 


Active Medications





Acetaminophen (Tylenol -)  650 mg PO Q6H PRN


   PRN Reason: PAIN


   Last Admin: 01/13/20 18:41 Dose:  650 mg


Albuterol Sulfate (Ventolin 0.083% Nebulizer Soln -)  1 amp NEB Q4H PRN


   PRN Reason: SHORT OF BREATH/WHEEZING


   Last Admin: 01/12/20 15:00 Dose:  1 amp


Albuterol/Ipratropium (Duoneb -)  1 amp NEB RQID [facility]


   Last Admin: 01/14/20 12:14 Dose:  Not Given


Amlodipine Besylate (Norvasc -)  10 mg PO DAILY [facility]


   Last Admin: 01/14/20 12:11 Dose:  10 mg


Atorvastatin Calcium (Lipitor -)  10 mg PO HS [facility]


   Last Admin: 01/13/20 21:43 Dose:  10 mg


Docusate Sodium (Colace -)  300 mg PO [facility]


   Last Admin: 01/13/20 21:41 Dose:  300 mg


Ergocalciferol (Drisdol -)  50,000 unit PO We [facility]


Heparin Sodium (Porcine) (Heparin -)  5,000 unit SQ TID [facility]


   Last Admin: 01/14/20 05:33 Dose:  5,000 unit


Sodium Chloride (1/2 Normal Saline)  1,000 mls @ 100 mls/hr IV ASDIR [facility]


   Last Admin: 01/14/20 12:17 Dose:  Not Given


Insulin Aspart (Novolog Vial Sliding Scale -)  1 vial SQ ACHS [facility]; Protocol


   Last Admin: 01/14/20 12:16 Dose:  Not Given


Melatonin (Melatonin)  3 mg PO HS [facility]


   Last Admin: 01/13/20 21:43 Dose:  3 mg


Methadone HCl 40 mg/ Methadone (HCl 20 mg)  60 mg PO DAILY@0600 [facility]


   Last Admin: 01/14/20 05:33 Dose:  60 mg


Multivitamins/Minerals/Vitamin C (Tab-A-Vit -)  1 tab PO DAILY [facility]


   Last Admin: 01/14/20 12:10 Dose:  1 tab


Ondansetron HCl (Zofran Injection)  4 mg IVPUSH Q4H PRN


   PRN Reason: NAUSEA AND/OR VOMITING


   Last Admin: 01/13/20 13:44 Dose:  4 mg


Pantoprazole Sodium (Protonix -)  40 mg PO DAILY [facility]


   Last Admin: 01/14/20 12:10 Dose:  40 mg


Sodium Bicarbonate (Sodium Bicarbonate -)  325 mg PO BID [facility]


   Last Admin: 01/14/20 12:11 Dose:  325 mg


Venlafaxine HCl (Effexor -)  50 mg PO DAILY [facility]


   Last Admin: 01/14/20 12:11 Dose:  50 mg











- Objective


Vital Signs: 


 Vital Signs











Temperature  98.1 F   01/14/20 06:08


 


Pulse Rate  70   01/14/20 06:08


 


Respiratory Rate  20 01/14/20 06:08


 


Blood Pressure  157/74   01/14/20 06:08


 


O2 Sat by Pulse Oximetry (%)  99   01/13/20 21:00











Constitutional: Yes: Calm


Eyes: Yes: Conjunctiva Clear


HENT: Yes: Atraumatic


Neck: Yes: Supple


Cardiovascular: Yes: S1, S2


Respiratory: Yes: CTA Bilaterally


Gastrointestinal: Yes: Soft


Genitourinary: Yes: Anglin Present


Edema: No


Neurological: Yes: Oriented


Labs: 


 CBC, BMP





 01/14/20 07:58 





 01/14/20 07:58 











Problem List





- Problems


(1) Acute kidney failure


Code(s): N17.9 - ACUTE KIDNEY FAILURE, UNSPECIFIED   


Qualifiers: 


   Acute renal failure type: unspecified   Qualified Code(s): N17.9 - Acute 

kidney failure, unspecified   





Assessment/Plan


 Current Medications











Generic Name Dose Route Start Last Admin





  Trade Name Freq  PRN Reason Stop Dose Admin


 


Acetaminophen  650 mg  01/10/20 14:43  01/13/20 18:41





  Tylenol -  PO   650 mg





  Q6H PRN   Administration





  PAIN   





     





     





     


 


Albuterol Sulfate  1 amp  01/10/20 17:48  01/12/20 15:00





  Ventolin 0.083% Nebulizer Soln -  NEB   1 amp





  Q4H PRN   Administration





  SHORT OF BREATH/WHEEZING   





     





     





     


 


Albuterol/Ipratropium  1 amp  01/10/20 20:00  01/14/20 12:14





  Duoneb -  NEB   Not Given





  RQID [facility]   





     





     





     





     


 


Amlodipine Besylate  10 mg  01/11/20 10:00  01/14/20 12:11





  Norvasc -  PO   10 mg





  DAILY JOHN   Administration





     





     





     





     


 


Atorvastatin Calcium  10 mg  01/10/20 22:00  01/13/20 21:43





  Lipitor -  PO   10 mg





  HS JOHN   Administration





     





     





     





     


 


Docusate Sodium  300 mg  01/10/20 22:00  01/13/20 21:41





  Colace -  PO   300 mg





  HS JOHN   Administration





     





     





     





     


 


Ergocalciferol  50,000 unit  01/15/20 10:00  





  Drisdol -  PO   





  We JOHN   





     





     





     





     


 


Heparin Sodium (Porcine)  5,000 unit  01/10/20 06:00  01/14/20 05:33





  Heparin -  SQ   5,000 unit





  TID JOHN   Administration





     





     





     





     


 


Sodium Chloride  1,000 mls @ 100 mls/hr  01/10/20 11:30  01/14/20 12:17





  1/2 Normal Saline  IV   Not Given





  ASDIR JOHN   





     





     





     





     


 


Insulin Aspart  1 vial  01/10/20 07:00  01/14/20 12:16





  Novolog Vial Sliding Scale -  SQ   Not Given





  [facility]   





     





     





  Protocol   





     


 


Melatonin  3 mg  01/13/20 22:00  01/13/20 21:43





  Melatonin  PO   3 mg





  HS JOHN   Administration





     





     





     





     


 


Methadone HCl 40 mg/ Methadone  60 mg  01/11/20 10:45  01/14/20 05:33





  HCl 20 mg  PO   60 mg





  DAILY@0600 JOHN   Administration





     





     





     





     


 


Multivitamins/Minerals/Vitamin C  1 tab  01/11/20 10:00  01/14/20 12:10





  Tab-A-Vit -  PO   1 tab





  DAILY JOHN   Administration





     





     





     





     


 


Ondansetron HCl  4 mg  01/13/20 09:35  01/13/20 13:44





  Zofran Injection  IVPUSH   4 mg





  Q4H PRN   Administration





  NAUSEA AND/OR VOMITING   





     





     





     


 


Pantoprazole Sodium  40 mg  01/10/20 18:00  01/14/20 12:10





  Protonix -  PO   40 mg





  DAILY JOHN   Administration





     





     





     





     


 


Sodium Bicarbonate  325 mg  01/12/20 10:00  01/14/20 12:11





  Sodium Bicarbonate -  PO   325 mg





  BID JOHN   Administration





     





     





     





     


 


Venlafaxine HCl  50 mg  01/11/20 10:00  01/14/20 12:11





  Effexor -  PO   50 mg





  DAILY JOHN   Administration





     





     





     





     














Impression


1. ROJELIO


2. hx CKD


3. DM


4. non compliance


5. narcotic dependence


6. insomnia


7. hyperlipidemia


8. urinary retention s/p suprapubic cath


9. hx anemia


10 Hep C


11. nephrolithiasis


12. prostate ca with mets





Plan


- follow ct scan results


- discussed case with oncology yesterday


- discussed with pts wife


- will need close outpt follow up


- no indication for HD


- avoid nsaids


- follow urine cultures

## 2020-01-14 NOTE — PN
Teaching Attending Note


Name of Resident: Natalia Ramirez





ATTENDING PHYSICIAN STATEMENT





I saw and evaluated the patient.


I reviewed the resident's note and discussed the case with the resident.


I agree with the resident's findings and plan as documented.








SUBJECTIVE:


Seen and examined at bedside. Feeling good, wants to go home, no complaints.





OBJECTIVE:


 Vital Signs - 24 hr











  01/13/20 01/13/20 01/13/20





  18:00 21:00 22:00


 


Temperature 98.9 F  98.7 F


 


Pulse Rate 71  68


 


Respiratory 20 20 20





Rate   


 


Blood Pressure 146/77  150/86


 


O2 Sat by Pulse  99 





Oximetry (%)   














  01/14/20 01/14/20 01/14/20





  06:08 09:00 15:45


 


Temperature 98.1 F 98.1 F 97.8 F


 


Pulse Rate 70 67 76


 


Respiratory 20 18 18





Rate   


 


Blood Pressure 157/74 145/75 134/85


 


O2 Sat by Pulse  98 





Oximetry (%)   





PE:


Constitutional: Yes: Well Nourished, No Distress, Calm


Cardiovascular: Yes: WNL, Regular Rate and Rhythm


Respiratory: Yes: WNL, Regular, CTA Bilaterally


Gastrointestinal: Yes: WNL, Normal Bowel Sounds, Soft, Other (+SP catheter)


Musculoskeletal: Yes: WNL


Extremities: Yes: WNL


Edema: No


 Current Medications





Acetaminophen (Tylenol -)  650 mg PO Q6H PRN


   PRN Reason: PAIN


   Last Admin: 01/13/20 18:41 Dose:  650 mg


Albuterol Sulfate (Ventolin 0.083% Nebulizer Soln -)  1 amp NEB Q4H PRN


   PRN Reason: SHORT OF BREATH/WHEEZING


   Last Admin: 01/12/20 15:00 Dose:  1 amp


Albuterol/Ipratropium (Duoneb -)  1 amp NEB RQID [facility]


   Last Admin: 01/14/20 16:32 Dose:  Not Given


Amlodipine Besylate (Norvasc -)  10 mg PO DAILY [facility]


   Last Admin: 01/14/20 12:11 Dose:  10 mg


Atorvastatin Calcium (Lipitor -)  10 mg PO HS [facility]


   Last Admin: 01/13/20 21:43 Dose:  10 mg


Docusate Sodium (Colace -)  300 mg PO [facility]


   Last Admin: 01/13/20 21:41 Dose:  300 mg


Ergocalciferol (Drisdol -)  50,000 unit PO [facility]


Heparin Sodium (Porcine) (Heparin -)  5,000 unit SQ TID [facility]


   Last Admin: 01/14/20 15:24 Dose:  5,000 unit


Sodium Chloride (1/2 Normal Saline)  1,000 mls @ 100 mls/hr IV ASDIR [facility]


   Last Admin: 01/14/20 12:17 Dose:  Not Given


Insulin Aspart (Novolog Vial Sliding Scale -)  1 vial SQ ACHS [facility]; Protocol


   Last Admin: 01/14/20 16:35 Dose:  Not Given


Melatonin (Melatonin)  3 mg PO HS [facility]


   Last Admin: 01/13/20 21:43 Dose:  3 mg


Methadone HCl 40 mg/ Methadone (HCl 20 mg)  60 mg PO DAILY@0600 [facility]


   Last Admin: 01/14/20 05:33 Dose:  60 mg


Multivitamins/Minerals/Vitamin C (Tab-A-Vit -)  1 tab PO DAILY [facility]


   Last Admin: 01/14/20 12:10 Dose:  1 tab


Ondansetron HCl (Zofran Injection)  4 mg IVPUSH Q4H PRN


   PRN Reason: NAUSEA AND/OR VOMITING


   Last Admin: 01/13/20 13:44 Dose:  4 mg


Pantoprazole Sodium (Protonix -)  40 mg PO DAILY [facility]


   Last Admin: 01/14/20 12:10 Dose:  40 mg


Sodium Bicarbonate (Sodium Bicarbonate -)  325 mg PO BID [facility]


   Last Admin: 01/14/20 12:11 Dose:  325 mg


Venlafaxine HCl (Effexor -)  50 mg PO DAILY [facility]


   Last Admin: 01/14/20 12:11 Dose:  50 mg





 Laboratory Results - last 24 hr











  01/11/20 01/13/20 01/14/20





  15:25 16:50 05:39


 


WBC   


 


RBC   


 


Hgb   


 


Hct   


 


MCV   


 


MCH   


 


MCHC   


 


RDW   


 


Plt Count   


 


MPV   


 


Absolute Neuts (auto)   


 


Neutrophils %   


 


Lymphocytes %   


 


Monocytes %   


 


Eosinophils %   


 


Basophils %   


 


Nucleated RBC %   


 


Sodium   


 


Potassium   


 


Chloride   


 


Carbon Dioxide   


 


Anion Gap   


 


BUN   


 


Creatinine   


 


Est GFR (CKD-EPI)AfAm   


 


Est GFR (CKD-EPI)NonAf   


 


POC Glucometer   100  116


 


Random Glucose   


 


Calcium   


 


Erythropoietin  5.7  


 


Total Bilirubin   


 


AST   


 


ALT   


 


Alkaline Phosphatase   


 


Total Protein   


 


Albumin   


 


Prostate Specific Ag  < 0.10  














  01/14/20 01/14/20





  07:58 07:58


 


WBC   3.9 L


 


RBC   3.19 L


 


Hgb   8.9 L


 


Hct   27.4 L


 


MCV   85.9


 


MCH   27.9


 


MCHC   32.5


 


RDW   14.6


 


Plt Count   131 L


 


MPV   9.0


 


Absolute Neuts (auto)   2.3


 


Neutrophils %   59.2  D


 


Lymphocytes %   29.0  D


 


Monocytes %   8.7  D


 


Eosinophils %   1.4  D


 


Basophils %   1.7


 


Nucleated RBC %   0


 


Sodium  139 


 


Potassium  4.6 


 


Chloride  111 H 


 


Carbon Dioxide  21 


 


Anion Gap  7 L 


 


BUN  46.2 H 


 


Creatinine  4.3 H 


 


Est GFR (CKD-EPI)AfAm  15.85 


 


Est GFR (CKD-EPI)NonAf  13.67 


 


POC Glucometer  


 


Random Glucose  119 H 


 


Calcium  9.1 


 


Erythropoietin  


 


Total Bilirubin  0.3 


 


AST  13 L 


 


ALT  13 


 


Alkaline Phosphatase  126 H 


 


Total Protein  6.6 


 


Albumin  3.4 


 


Prostate Specific Ag  








 Microbiology





01/13/20 11:30   Urine - Urine Suprapubic   Urine Culture - Preliminary


                            Lactose Fermenting Neg Bacilli


                            Non Lactose Fermenting Gnb


                            Pending Organism











ASSESSMENT AND PLAN:





63 yo M PMH of HTN, HLD, CKD, metastatic prostate Ca, COPD, prior CVA, Hep C 

who presented to the ED on instruction from Dr. Heck for elevated creatinine, 

acute renal failure.





1) Acute on chronic renal failure


-creatinine unchanged


-renal following





2) UTI


-urine cx LF GNB


-start on IV abx pend sens


-has hx of MDR bugs


-ID consulted





3) Met Prostate CA


-CT C/A/P and bone scan ordered


-follow-up





HTN


HLD


COPD


CVA hx


Hep C


AOCD


Mood d/o


DM2





-cw current mnmgt





Problem List





- Problems


(1) Acute kidney failure


Code(s): N17.9 - ACUTE KIDNEY FAILURE, UNSPECIFIED   


Qualifiers: 


   Acute renal failure type: unspecified   Qualified Code(s): N17.9 - Acute 

kidney failure, unspecified   





(2) COPD (chronic obstructive pulmonary disease)


Code(s): J44.9 - CHRONIC OBSTRUCTIVE PULMONARY DISEASE, UNSPECIFIED   


Qualifiers: 


   COPD type: unspecified COPD   Qualified Code(s): J44.9 - Chronic obstructive 

pulmonary disease, unspecified   





(3) Chronic kidney disease


Code(s): N18.9 - CHRONIC KIDNEY DISEASE, UNSPECIFIED   


Qualifiers: 


   Chronic kidney disease stage: stage 3 (moderate)   Qualified Code(s): N18.3 

- Chronic kidney disease, stage 3 (moderate)   





(4) Chronic pain


Code(s): G89.29 - OTHER CHRONIC PAIN   





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


Code(s): E11.59 - TYPE 2 DIABETES MELLITUS WITH OTH CIRCULATORY COMPLICATIONS; 

I70.209 - UNSP ATHSCL NATIVE ARTERIES OF EXTREMITIES, UNSP EXTREMITY   





(6) HTN (hypertension)


Code(s): I10 - ESSENTIAL (PRIMARY) HYPERTENSION   


Qualifiers: 


   Hypertension type: essential hypertension   Qualified Code(s): I10 - 

Essential (primary) hypertension   





(7) History of prostate cancer


Code(s): Z85.46 - PERSONAL HISTORY OF MALIGNANT NEOPLASM OF PROSTATE

## 2020-01-14 NOTE — PN
Physical Exam: 


SUBJECTIVE: Patient seen and examined. Offers no complaints. NO acute events 

overnight.














OBJECTIVE:





 Vital Signs











 Period  Temp  Pulse  Resp  BP Sys/Roland  Pulse Ox


 


 Last 24 Hr  97.8 F-98.9 F  67-76  18-20  134-157/74-86  98-99











GENERAL: in NAD, comfortable


HEAD: Normal with no signs of trauma.


EYES: PERRL, conjunctiva clear


ENT: moist mucous membranes.


NECK: supple. 


LUNGS: CTA b/l


HEART: RRR


ABDOMEN: Soft, nontender, nondistended, normoactive bowel sounds


EXTREMITIES: 2+ pulses, no edema. 











 Laboratory Results - last 24 hr











  01/11/20 01/14/20 01/14/20





  15:25 05:39 07:58


 


WBC   


 


RBC   


 


Hgb   


 


Hct   


 


MCV   


 


MCH   


 


MCHC   


 


RDW   


 


Plt Count   


 


MPV   


 


Absolute Neuts (auto)   


 


Neutrophils %   


 


Lymphocytes %   


 


Monocytes %   


 


Eosinophils %   


 


Basophils %   


 


Nucleated RBC %   


 


Sodium    139


 


Potassium    4.6


 


Chloride    111 H


 


Carbon Dioxide    21


 


Anion Gap    7 L


 


BUN    46.2 H


 


Creatinine    4.3 H


 


Est GFR (CKD-EPI)AfAm    15.85


 


Est GFR (CKD-EPI)NonAf    13.67


 


POC Glucometer   116 


 


Random Glucose    119 H


 


Calcium    9.1


 


Erythropoietin  5.7  


 


Total Bilirubin    0.3


 


AST    13 L


 


ALT    13


 


Alkaline Phosphatase    126 H


 


Total Protein    6.6


 


Albumin    3.4


 


Prostate Specific Ag  < 0.10  














  01/14/20





  07:58


 


WBC  3.9 L


 


RBC  3.19 L


 


Hgb  8.9 L


 


Hct  27.4 L


 


MCV  85.9


 


MCH  27.9


 


MCHC  32.5


 


RDW  14.6


 


Plt Count  131 L


 


MPV  9.0


 


Absolute Neuts (auto)  2.3


 


Neutrophils %  59.2  D


 


Lymphocytes %  29.0  D


 


Monocytes %  8.7  D


 


Eosinophils %  1.4  D


 


Basophils %  1.7


 


Nucleated RBC %  0


 


Sodium 


 


Potassium 


 


Chloride 


 


Carbon Dioxide 


 


Anion Gap 


 


BUN 


 


Creatinine 


 


Est GFR (CKD-EPI)AfAm 


 


Est GFR (CKD-EPI)NonAf 


 


POC Glucometer 


 


Random Glucose 


 


Calcium 


 


Erythropoietin 


 


Total Bilirubin 


 


AST 


 


ALT 


 


Alkaline Phosphatase 


 


Total Protein 


 


Albumin 


 


Prostate Specific Ag 








Active Medications











Generic Name Dose Route Start Last Admin





  Trade Name Freq  PRN Reason Stop Dose Admin


 


Acetaminophen  650 mg  01/10/20 14:43  01/13/20 18:41





  Tylenol -  PO   650 mg





  Q6H PRN   Administration





  PAIN   





     





     





     


 


Albuterol Sulfate  1 amp  01/10/20 17:48  01/12/20 15:00





  Ventolin 0.083% Nebulizer Soln -  NEB   1 amp





  Q4H PRN   Administration





  SHORT OF BREATH/WHEEZING   





     





     





     


 


Albuterol/Ipratropium  1 amp  01/10/20 20:00  01/14/20 16:32





  Duoneb -  NEB   Not Given





  RQID JOHN   





     





     





     





     


 


Amlodipine Besylate  10 mg  01/11/20 10:00  01/14/20 12:11





  Norvasc -  PO   10 mg





  DAILY JOHN   Administration





     





     





     





     


 


Atorvastatin Calcium  10 mg  01/10/20 22:00  01/13/20 21:43





  Lipitor -  PO   10 mg





  HS JOHN   Administration





     





     





     





     


 


Docusate Sodium  300 mg  01/10/20 22:00  01/13/20 21:41





  Colace -  PO   300 mg





  HS JOHN   Administration





     





     





     





     


 


Ergocalciferol  50,000 unit  01/15/20 10:00  





  Drisdol -  PO   





  We JOHN   





     





     





     





     


 


Heparin Sodium (Porcine)  5,000 unit  01/10/20 06:00  01/14/20 15:24





  Heparin -  SQ   5,000 unit





  TID JOHN   Administration





     





     





     





     


 


Sodium Chloride  1,000 mls @ 100 mls/hr  01/10/20 11:30  01/14/20 12:17





  1/2 Normal Saline  IV   Not Given





  ASDIR JOHN   





     





     





     





     


 


Insulin Aspart  1 vial  01/10/20 07:00  01/14/20 16:35





  Novolog Vial Sliding Scale -  SQ   Not Given





  [facility]   





     





     





  Protocol   





     


 


Melatonin  3 mg  01/13/20 22:00  01/13/20 21:43





  Melatonin  PO   3 mg





  HS JOHN   Administration





     





     





     





     


 


Methadone HCl 40 mg/ Methadone  60 mg  01/11/20 10:45  01/14/20 05:33





  HCl 20 mg  PO   60 mg





  DAILY@0600 JOHN   Administration





     





     





     





     


 


Multivitamins/Minerals/Vitamin C  1 tab  01/11/20 10:00  01/14/20 12:10





  Tab-A-Vit -  PO   1 tab





  DAILY JOHN   Administration





     





     





     





     


 


Ondansetron HCl  4 mg  01/13/20 09:35  01/13/20 13:44





  Zofran Injection  IVPUSH   4 mg





  Q4H PRN   Administration





  NAUSEA AND/OR VOMITING   





     





     





     


 


Pantoprazole Sodium  40 mg  01/10/20 18:00  01/14/20 12:10





  Protonix -  PO   40 mg





  DAILY JOHN   Administration





     





     





     





     


 


Sodium Bicarbonate  325 mg  01/12/20 10:00  01/14/20 12:11





  Sodium Bicarbonate -  PO   325 mg





  BID JOHN   Administration





     





     





     





     


 


Venlafaxine HCl  50 mg  01/11/20 10:00  01/14/20 12:11





  Effexor -  PO   50 mg





  DAILY JOHN   Administration





     





     





     





     











ASSESSMENT/PLAN:








63 yo M PMH of HTN, HLD, CKD, metastatic prostate Ca, COPD, prior CVA, Hep C 

who presented to the ED on instruction from Dr. Heck for elevated creatinine, 

acute renal failure.





#ROJELIO on CKD


-cr 4.3 from 4.1


-monitor cr


-nephro on board


-cont 1/2 NS @ 100


-bicarb 325mg bid








#UTI


-urine cx LF GNB, LNF GNB


-awaiting organism, sensitivity 


-has hx of MDR bugs


-ID consulted





#Met Prostate CA


-CT C/A/P and bone scan ordered. Awaiting results


-follow-up





#HTN- cont. norvasc 10mg





#HLD- lipitor 10mg





#COPD


#CVA hx


#Hep C


#AOCD


#Mood d/o


#DM2


-cont current management





#dvt ppx


-hep sq










































































Visit type





- Emergency Visit


Emergency Visit: Yes


ED Registration Date: 01/09/20


Care time: The patient presented to the Emergency Department on the above date 

and was hospitalized for further evaluation of their emergent condition.





- New Patient


This patient is new to me today: Yes


Date on this admission: 01/14/20





- Critical Care


Critical Care patient: No





ATTENDING PHYSICIAN STATEMENT





I saw and evaluated the patient.


I reviewed the resident's note and discussed the case with the resident.


I agree with the resident's findings and plan as documented.








SUBJECTIVE:








OBJECTIVE:








ASSESSMENT AND PLAN:

## 2020-01-14 NOTE — PN
Progress Note (short form)





- Note


Progress Note: 





Patient  seen and examined 





Complains of anorexia and some nausea without emesis


 Last Vital Signs











Temp Pulse Resp BP Pulse Ox


 


 97.8 F   76   18   134/85   98 


 


 01/14/20 15:45  01/14/20 15:45  01/14/20 15:45  01/14/20 15:45  01/14/20 09:00











HEENT: YEHUDA, EOM Intact


Oropharynx: No thrush, No mucositis, upper bite plate , edentulous LE's


Cor: RSR, No murmurs, No gallops


Lungs: Clear to P&A


Abd: Soft, Normal bowel sounds, No organomegaly,suprapubic catheter


Ext:No significant edema


Skin: No rashes, Integument intact


 CBC, BMP





 01/14/20 07:58 





 01/14/20 07:58 





 Current Medications











Generic Name Dose Route Start Last Admin





  Trade Name Freq  PRN Reason Stop Dose Admin


 


Acetaminophen  650 mg  01/10/20 14:43  01/13/20 18:41





  Tylenol -  PO   650 mg





  Q6H PRN   Administration





  PAIN   





     





     





     


 


Albuterol Sulfate  1 amp  01/10/20 17:48  01/12/20 15:00





  Ventolin 0.083% Nebulizer Soln -  NEB   1 amp





  Q4H PRN   Administration





  SHORT OF BREATH/WHEEZING   





     





     





     


 


Albuterol/Ipratropium  1 amp  01/10/20 20:00  01/14/20 16:32





  Duoneb -  NEB   Not Given





  RQID JOHN   





     





     





     





     


 


Amlodipine Besylate  10 mg  01/11/20 10:00  01/14/20 12:11





  Norvasc -  PO   10 mg





  DAILY JOHN   Administration





     





     





     





     


 


Atorvastatin Calcium  10 mg  01/10/20 22:00  01/13/20 21:43





  Lipitor -  PO   10 mg





  HS JOHN   Administration





     





     





     





     


 


Docusate Sodium  300 mg  01/10/20 22:00  01/13/20 21:41





  Colace -  PO   300 mg





  HS JOHN   Administration





     





     





     





     


 


Ergocalciferol  50,000 unit  01/15/20 10:00  





  Drisdol -  PO   





  We JOHN   





     





     





     





     


 


Heparin Sodium (Porcine)  5,000 unit  01/10/20 06:00  01/14/20 15:24





  Heparin -  SQ   5,000 unit





  TID JOHN   Administration





     





     





     





     


 


Sodium Chloride  1,000 mls @ 100 mls/hr  01/10/20 11:30  01/14/20 17:56





  1/2 Normal Saline  IV   100 mls/hr





  ASDIR JOHN   Administration





     





     





     





     


 


Insulin Aspart  1 vial  01/10/20 07:00  01/14/20 16:35





  Novolog Vial Sliding Scale -  SQ   Not Given





  [facility]   





     





     





  Protocol   





     


 


Melatonin  3 mg  01/13/20 22:00  01/13/20 21:43





  Melatonin  PO   3 mg





  HS JOHN   Administration





     





     





     





     


 


Methadone HCl 40 mg/ Methadone  60 mg  01/11/20 10:45  01/14/20 05:33





  HCl 20 mg  PO   60 mg





  DAILY@0600 JOHN   Administration





     





     





     





     


 


Multivitamins/Minerals/Vitamin C  1 tab  01/11/20 10:00  01/14/20 12:10





  Tab-A-Vit -  PO   1 tab





  DAILY JOHN   Administration





     





     





     





     


 


Ondansetron HCl  4 mg  01/13/20 09:35  01/13/20 13:44





  Zofran Injection  IVPUSH   4 mg





  Q4H PRN   Administration





  NAUSEA AND/OR VOMITING   





     





     





     


 


Pantoprazole Sodium  40 mg  01/10/20 18:00  01/14/20 12:10





  Protonix -  PO   40 mg





  DAILY JOHN   Administration





     





     





     





     


 


Sodium Bicarbonate  325 mg  01/12/20 10:00  01/14/20 12:11





  Sodium Bicarbonate -  PO   325 mg





  BID JOHN   Administration





     





     





     





     


 


Venlafaxine HCl  50 mg  01/11/20 10:00  01/14/20 12:11





  Effexor -  PO   50 mg





  DAILY JOHN   Administration





     





     





     





     





Impression:


1. ROJELIO/CKD


Prostate ca /mets


On Ivhydration 


DM


HBP


Anemia





Per renal 


Awaiting CT results

## 2020-01-15 VITALS — TEMPERATURE: 98.4 F | HEART RATE: 78 BPM | SYSTOLIC BLOOD PRESSURE: 156 MMHG | DIASTOLIC BLOOD PRESSURE: 83 MMHG

## 2020-01-15 LAB
ALBUMIN SERPL-MCNC: 3.4 G/DL (ref 3.4–5)
ALP SERPL-CCNC: 132 U/L (ref 45–117)
ALT SERPL-CCNC: 14 U/L (ref 13–61)
ANION GAP SERPL CALC-SCNC: 8 MMOL/L (ref 8–16)
AST SERPL-CCNC: 17 U/L (ref 15–37)
BASOPHILS # BLD: 1.7 % (ref 0–2)
BILIRUB SERPL-MCNC: 0.3 MG/DL (ref 0.2–1)
BUN SERPL-MCNC: 42.7 MG/DL (ref 7–18)
CALCIUM SERPL-MCNC: 8.9 MG/DL (ref 8.5–10.1)
CHLORIDE SERPL-SCNC: 109 MMOL/L (ref 98–107)
CO2 SERPL-SCNC: 19 MMOL/L (ref 21–32)
CREAT SERPL-MCNC: 4.1 MG/DL (ref 0.55–1.3)
DEPRECATED RDW RBC AUTO: 15 % (ref 11.9–15.9)
EOSINOPHIL # BLD: 1.4 % (ref 0–4.5)
GLUCOSE SERPL-MCNC: 144 MG/DL (ref 74–106)
HCT VFR BLD CALC: 28.1 % (ref 35.4–49)
HGB BLD-MCNC: 9.2 GM/DL (ref 11.7–16.9)
LYMPHOCYTES # BLD: 20.6 % (ref 8–40)
MCH RBC QN AUTO: 27.9 PG (ref 25.7–33.7)
MCHC RBC AUTO-ENTMCNC: 32.7 G/DL (ref 32–35.9)
MCV RBC: 85.4 FL (ref 80–96)
MONOCYTES # BLD AUTO: 5.2 % (ref 3.8–10.2)
NEUTROPHILS # BLD: 71.1 % (ref 42.8–82.8)
PLATELET # BLD AUTO: 124 K/MM3 (ref 134–434)
PMV BLD: 9.2 FL (ref 7.5–11.1)
POTASSIUM SERPLBLD-SCNC: 4.5 MMOL/L (ref 3.5–5.1)
PROT SERPL-MCNC: 6.6 G/DL (ref 6.4–8.2)
RBC # BLD AUTO: 3.29 M/MM3 (ref 4–5.6)
SODIUM SERPL-SCNC: 136 MMOL/L (ref 136–145)
WBC # BLD AUTO: 4.4 K/MM3 (ref 4–10)

## 2020-01-15 RX ADMIN — INSULIN ASPART SCH: 100 INJECTION, SOLUTION INTRAVENOUS; SUBCUTANEOUS at 06:00

## 2020-01-15 RX ADMIN — INSULIN ASPART SCH: 100 INJECTION, SOLUTION INTRAVENOUS; SUBCUTANEOUS at 11:35

## 2020-01-15 RX ADMIN — AMLODIPINE BESYLATE SCH MG: 10 TABLET ORAL at 09:43

## 2020-01-15 RX ADMIN — SODIUM BICARBONATE TAB 325 MG SCH MG: 325 TAB at 09:43

## 2020-01-15 RX ADMIN — MULTIVITAMIN TABLET SCH TAB: TABLET at 09:43

## 2020-01-15 RX ADMIN — INSULIN ASPART SCH: 100 INJECTION, SOLUTION INTRAVENOUS; SUBCUTANEOUS at 16:31

## 2020-01-15 RX ADMIN — ACETAMINOPHEN PRN MG: 325 TABLET ORAL at 13:23

## 2020-01-15 RX ADMIN — IPRATROPIUM BROMIDE AND ALBUTEROL SULFATE SCH: .5; 3 SOLUTION RESPIRATORY (INHALATION) at 16:00

## 2020-01-15 RX ADMIN — HEPARIN SODIUM SCH UNIT: 5000 INJECTION, SOLUTION INTRAVENOUS; SUBCUTANEOUS at 05:59

## 2020-01-15 RX ADMIN — PANTOPRAZOLE SODIUM SCH MG: 40 TABLET, DELAYED RELEASE ORAL at 09:43

## 2020-01-15 RX ADMIN — IPRATROPIUM BROMIDE AND ALBUTEROL SULFATE SCH: .5; 3 SOLUTION RESPIRATORY (INHALATION) at 12:21

## 2020-01-15 RX ADMIN — IPRATROPIUM BROMIDE AND ALBUTEROL SULFATE SCH: .5; 3 SOLUTION RESPIRATORY (INHALATION) at 07:35

## 2020-01-15 RX ADMIN — HEPARIN SODIUM SCH: 5000 INJECTION, SOLUTION INTRAVENOUS; SUBCUTANEOUS at 13:30

## 2020-01-15 RX ADMIN — METHADONE HYDROCHLORIDE SCH MG: 40 TABLET ORAL at 05:59

## 2020-01-15 RX ADMIN — SODIUM CHLORIDE SCH: 4.5 INJECTION, SOLUTION INTRAVENOUS at 13:30

## 2020-01-15 RX ADMIN — VENLAFAXINE HYDROCHLORIDE SCH MG: 25 TABLET ORAL at 09:43

## 2020-01-15 NOTE — PN
Progress Note, Physician


History of Present Illness: 


Reports decreased appetite, denies chest pain or dyspnea.





- Current Medication List


Current Medications: 


Active Medications





Acetaminophen (Tylenol -)  650 mg PO Q6H PRN


   PRN Reason: PAIN


   Last Admin: 01/13/20 18:41 Dose:  650 mg


Albuterol Sulfate (Ventolin 0.083% Nebulizer Soln -)  1 amp NEB Q4H PRN


   PRN Reason: SHORT OF BREATH/WHEEZING


   Last Admin: 01/12/20 15:00 Dose:  1 amp


Albuterol/Ipratropium (Duoneb -)  1 amp NEB RQID [facility]


   Last Admin: 01/14/20 20:10 Dose:  Not Given


Amlodipine Besylate (Norvasc -)  10 mg PO DAILY [facility]


   Last Admin: 01/15/20 09:43 Dose:  10 mg


Atorvastatin Calcium (Lipitor -)  10 mg PO HS [facility]


   Last Admin: 01/14/20 22:27 Dose:  10 mg


Docusate Sodium (Colace -)  300 mg PO HS [facility]


   Last Admin: 01/14/20 22:27 Dose:  300 mg


Ergocalciferol (Drisdol -)  50,000 unit PO We [facility]


   Last Admin: 01/15/20 09:43 Dose:  50,000 unit


Heparin Sodium (Porcine) (Heparin -)  5,000 unit SQ TID [facility]


   Last Admin: 01/15/20 05:59 Dose:  5,000 unit


Sodium Chloride (1/2 Normal Saline)  1,000 mls @ 100 mls/hr IV ASDIR [facility]


   Last Admin: 01/14/20 17:56 Dose:  100 mls/hr


Insulin Aspart (Novolog Vial Sliding Scale -)  1 vial SQ ACHS [facility]; Protocol


   Last Admin: 01/15/20 06:00 Dose:  Not Given


Melatonin (Melatonin)  3 mg PO HS [facility]


   Last Admin: 01/14/20 22:27 Dose:  3 mg


Methadone HCl 40 mg/ Methadone (HCl 20 mg)  60 mg PO DAILY@0600 [facility]


   Last Admin: 01/15/20 05:59 Dose:  60 mg


Multivitamins/Minerals/Vitamin C (Tab-A-Vit -)  1 tab PO DAILY [facility]


   Last Admin: 01/15/20 09:43 Dose:  1 tab


Ondansetron HCl (Zofran Injection)  4 mg IVPUSH Q4H PRN


   PRN Reason: NAUSEA AND/OR VOMITING


   Last Admin: 01/13/20 13:44 Dose:  4 mg


Pantoprazole Sodium (Protonix -)  40 mg PO DAILY [facility]


   Last Admin: 01/15/20 09:43 Dose:  40 mg


Sodium Bicarbonate (Sodium Bicarbonate -)  325 mg PO BID [facility]


   Last Admin: 01/15/20 09:43 Dose:  325 mg


Venlafaxine HCl (Effexor -)  50 mg PO DAILY [facility]


   Last Admin: 01/15/20 09:43 Dose:  50 mg











- Objective


Vital Signs: 


 Vital Signs











Temperature  97.8 F   01/14/20 15:45


 


Pulse Rate  76   01/14/20 15:45


 


Respiratory Rate  18   01/14/20 21:00


 


Blood Pressure  134/85   01/14/20 15:45


 


O2 Sat by Pulse Oximetry (%)  98   01/14/20 21:00











Constitutional: Yes: No Distress, Calm, Thin


Neck: Yes: Supple


Cardiovascular: Yes: Regular Rate and Rhythm


Respiratory: Yes: Regular, Diminished


Gastrointestinal: Yes: Normal Bowel Sounds, Soft


Edema: No


Labs: 


 CBC, BMP





 01/15/20 07:40 





 01/15/20 07:40 











Problem List





- Problems


(1) Acute kidney failure


Code(s): N17.9 - ACUTE KIDNEY FAILURE, UNSPECIFIED   


Qualifiers: 


   Acute renal failure type: unspecified   Qualified Code(s): N17.9 - Acute 

kidney failure, unspecified   





(2) Chronic kidney disease


Code(s): N18.9 - CHRONIC KIDNEY DISEASE, UNSPECIFIED   


Qualifiers: 


   Chronic kidney disease stage: stage 3 (moderate)   Qualified Code(s): N18.3 

- Chronic kidney disease, stage 3 (moderate)   





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


Code(s): E11.59 - TYPE 2 DIABETES MELLITUS WITH OTH CIRCULATORY COMPLICATIONS; 

I70.209 - UNSP ATHSCL NATIVE ARTERIES OF EXTREMITIES, UNSP EXTREMITY   





(4) HTN (hypertension)


Code(s): I10 - ESSENTIAL (PRIMARY) HYPERTENSION   


Qualifiers: 


   Hypertension type: essential hypertension   Qualified Code(s): I10 - 

Essential (primary) hypertension   





Assessment/Plan





1. Acute on CKD


2. DM


3. Non compliance


4. Narcotic dependence


5. Insomnia


6. hyperlipidemia


7. Urinary retention s/p suprapubic cath


8. History of anemia


9. Hep C


10. Nephrolithiasis


11. Prostate ca with mets


12. Elevated BNP 


13. HTN


14. Anemia








PLAN:


1. Continue Norvasc 10 mg QD and Lipitor 10 mg QHS 


2. Judicious hydration


3. DVT and GI prophylaxis


4. Bronchodilator and O2 as needed


5. Observe off abx per ID


6. F/u ct scan results

## 2020-01-15 NOTE — CONS
INFECTIOUS DISEASE CONSULTATION  

 

DATE OF CONSULTATION:  

 

DATE OF DICTATION:  01/15/2020

 

HISTORY:  The patient is a 64-year-old male with a history of metastatic prostate

cancer and indwelling suprapubic catheter evaluated for positive urine culture.  He

was admitted to the hospital on January 9, 2020, with acute on chronic renal failure.

 A urinalysis showed many white cells, and urine culture grew mixed organisms.  His

course was complicated by 1 episode of low-grade fever to 100.  At the present time,

he is comfortable in bed.  He has no complaints of suprapubic pain.  Denies fever or

chills.  He reports that his suprapubic catheter is changed on a monthly basis.

 

PAST MEDICAL HISTORY:  Positive for metastatic prostate cancer, chronic kidney

disease, hypertension, hyperlipidemia, COPD, CVA.

 

ALLERGIES:  No known allergies.

 

LABORATORY DATA:  White count 4.4, platelets 124, creatinine 4.1.  Urinalysis, 265

white cells.  Urine culture, mixed organisms.

 

PHYSICAL EXAMINATION: 

General:  He is chronically ill appearing.  Not acutely toxic.  Patient appears

cachectic and icteric. 

Vital Signs:  Temperature 97.8, maximum temperature 100, blood pressure 134/85, pulse

76 regular, respirations 18 per minute.  

Heart:  Sounds S1, S2.  

Lungs:  Clear.  

Abdomen:  Soft.  No suprapubic tenderness.  No erythema or drainage at the suprapubic

site.  

Extremities:  Negative for edema.

 

IMPRESSION: 

1.  Positive urine culture, mixed organisms consistent with contamination.

2.  Status post suprapubic catheter.

3.  Acute on chronic renal failure.

4.  Metastatic prostate cancer.

 

PLAN:  Urine culture consistent with contamination.  Would observe off antibiotic

therapy.  Suprapubic catheter change as per Urology.

 

Thank you for the kind referral.

 

 

NEPTALI RUSSELL M.D.

 

[id number]

DD: 01/15/2020 11:34

DT: 01/15/2020 14:38

Job #:  [id number]

## 2020-01-15 NOTE — PN
Progress Note, Physician


History of Present Illness: 





Pt seen and examined at bedside. He is awake and alert. He denies shortness of 

breath. 





- Current Medication List


Current Medications: 


Active Medications





Acetaminophen (Tylenol -)  650 mg PO Q6H PRN


   PRN Reason: PAIN


   Last Admin: 01/15/20 13:23 Dose:  650 mg


Albuterol Sulfate (Ventolin 0.083% Nebulizer Soln -)  1 amp NEB Q4H PRN


   PRN Reason: SHORT OF BREATH/WHEEZING


   Last Admin: 01/12/20 15:00 Dose:  1 amp


Albuterol/Ipratropium (Duoneb -)  1 amp NEB RQID [facility]


   Last Admin: 01/15/20 16:00 Dose:  Not Given


Amlodipine Besylate (Norvasc -)  10 mg PO DAILY [facility]


   Last Admin: 01/15/20 09:43 Dose:  10 mg


Atorvastatin Calcium (Lipitor -)  10 mg PO HS [facility]


   Last Admin: 01/14/20 22:27 Dose:  10 mg


Docusate Sodium (Colace -)  300 mg PO HS [facility]


   Last Admin: 01/14/20 22:27 Dose:  300 mg


Ergocalciferol (Drisdol -)  50,000 unit PO We [facility]


   Last Admin: 01/15/20 09:43 Dose:  50,000 unit


Heparin Sodium (Porcine) (Heparin -)  5,000 unit SQ TID [facility]


   Last Admin: 01/15/20 13:30 Dose:  Not Given


Sodium Chloride (1/2 Normal Saline)  1,000 mls @ 100 mls/hr IV ASDIR [facility]


   Last Admin: 01/15/20 13:30 Dose:  Not Given


Insulin Aspart (Novolog Vial Sliding Scale -)  1 vial SQ ACHS [facility]; Protocol


   Last Admin: 01/15/20 11:35 Dose:  Not Given


Melatonin (Melatonin)  3 mg PO HS [facility]


   Last Admin: 01/14/20 22:27 Dose:  3 mg


Methadone HCl 40 mg/ Methadone (HCl 20 mg)  60 mg PO DAILY@0600 [facility]


   Last Admin: 01/15/20 05:59 Dose:  60 mg


Multivitamins/Minerals/Vitamin C (Tab-A-Vit -)  1 tab PO DAILY [facility]


   Last Admin: 01/15/20 09:43 Dose:  1 tab


Ondansetron HCl (Zofran Injection)  4 mg IVPUSH Q4H PRN


   PRN Reason: NAUSEA AND/OR VOMITING


   Last Admin: 01/13/20 13:44 Dose:  4 mg


Pantoprazole Sodium (Protonix -)  40 mg PO DAILY [facility]


   Last Admin: 01/15/20 09:43 Dose:  40 mg


Sodium Bicarbonate (Sodium Bicarbonate -)  325 mg PO BID [facility]


   Last Admin: 01/15/20 09:43 Dose:  325 mg


Venlafaxine HCl (Effexor -)  50 mg PO DAILY [facility]


   Last Admin: 01/15/20 09:43 Dose:  50 mg











- Objective


Vital Signs: 


 Vital Signs











Temperature  98.4 F   01/15/20 14:43


 


Pulse Rate  78   01/15/20 14:43


 


Respiratory Rate  18   01/15/20 14:43


 


Blood Pressure  156/83   01/15/20 14:43


 


O2 Sat by Pulse Oximetry (%)  97   01/15/20 09:00











Constitutional: Yes: Calm


Eyes: Yes: Conjunctiva Clear


HENT: Yes: Atraumatic


Neck: Yes: Supple


Cardiovascular: Yes: S1, S2


Respiratory: Yes: CTA Bilaterally


Gastrointestinal: Yes: Soft


Genitourinary: Yes: Other (suprapubic cath)


Musculoskeletal: Yes: WNL


Edema: No


Neurological: Yes: Oriented


Psychiatric: Yes: Oriented


Labs: 


 CBC, BMP





 01/15/20 07:40 





 01/15/20 07:40 











Problem List





- Problems


(1) Acute kidney failure


Code(s): N17.9 - ACUTE KIDNEY FAILURE, UNSPECIFIED   


Qualifiers: 


   Acute renal failure type: unspecified   Qualified Code(s): N17.9 - Acute 

kidney failure, unspecified   





Assessment/Plan


 Current Medications











Generic Name Dose Route Start Last Admin





  Trade Name Freq  PRN Reason Stop Dose Admin


 


Acetaminophen  650 mg  01/10/20 14:43  01/15/20 13:23





  Tylenol -  PO   650 mg





  Q6H PRN   Administration





  PAIN   





     





     





     


 


Albuterol Sulfate  1 amp  01/10/20 17:48  01/12/20 15:00





  Ventolin 0.083% Nebulizer Soln -  NEB   1 amp





  Q4H PRN   Administration





  SHORT OF BREATH/WHEEZING   





     





     





     


 


Albuterol/Ipratropium  1 amp  01/10/20 20:00  01/15/20 16:00





  Duoneb -  NEB   Not Given





  RQID JOHN   





     





     





     





     


 


Amlodipine Besylate  10 mg  01/11/20 10:00  01/15/20 09:43





  Norvasc -  PO   10 mg





  DAILY [facility]   Administration





     





     





     





     


 


Atorvastatin Calcium  10 mg  01/10/20 22:00  01/14/20 22:27





  Lipitor -  PO   10 mg





  HS [facility]   Administration





     





     





     





     


 


Docusate Sodium  300 mg  01/10/20 22:00  01/14/20 22:27





  Colace -  PO   300 mg





  HS [facility]   Administration





     





     





     





     


 


Ergocalciferol  50,000 unit  01/15/20 10:00  01/15/20 09:43





  Drisdol -  PO   50,000 unit





  We [facility]   Administration





     





     





     





     


 


Heparin Sodium (Porcine)  5,000 unit  01/10/20 06:00  01/15/20 13:30





  Heparin -  SQ   Not Given





  TID [facility]   





     





     





     





     


 


Sodium Chloride  1,000 mls @ 100 mls/hr  01/10/20 11:30  01/15/20 13:30





  1/2 Normal Saline  IV   Not Given





  ASDIR [facility]   





     





     





     





     


 


Insulin Aspart  1 vial  01/10/20 07:00  01/15/20 11:35





  Novolog Vial Sliding Scale -  SQ   Not Given





  ACHS [facility]   





     





     





  Protocol   





     


 


Melatonin  3 mg  01/13/20 22:00  01/14/20 22:27





  Melatonin  PO   3 mg





  HS [facility]   Administration





     





     





     





     


 


Methadone HCl 40 mg/ Methadone  60 mg  01/11/20 10:45  01/15/20 05:59





  HCl 20 mg  PO   60 mg





  DAILY@0600 [facility]   Administration





     





     





     





     


 


Multivitamins/Minerals/Vitamin C  1 tab  01/11/20 10:00  01/15/20 09:43





  Tab-A-Vit -  PO   1 tab





  DAILY [facility]   Administration





     





     





     





     


 


Ondansetron HCl  4 mg  01/13/20 09:35  01/13/20 13:44





  Zofran Injection  IVPUSH   4 mg





  Q4H PRN   Administration





  NAUSEA AND/OR VOMITING   





     





     





     


 


Pantoprazole Sodium  40 mg  01/10/20 18:00  01/15/20 09:43





  Protonix -  PO   40 mg





  DAILY [facility]   Administration





     





     





     





     


 


Sodium Bicarbonate  325 mg  01/12/20 10:00  01/15/20 09:43





  Sodium Bicarbonate -  PO   325 mg





  BID [facility]   Administration





     





     





     





     


 


Venlafaxine HCl  50 mg  01/11/20 10:00  01/15/20 09:43





  Effexor -  PO   50 mg





  DAILY [facility]   Administration





     





     





     





     














Impression


1. ROJELIO


2. hx CKD


3. DM


4. non compliance


5. narcotic dependence


6. insomnia


7. hyperlipidemia


8. urinary retention s/p suprapubic cath


9. hx anemia


10 Hep C


11. nephrolithiasis


12. prostate ca with mets





Plan


- renal function mildly improved


- cont fluids for now


- follow scan


- no indication for HD


- avoid nsaids

## 2020-01-15 NOTE — PN
Teaching Attending Note


Name of Resident: Natalia Ramirez





ATTENDING PHYSICIAN STATEMENT





I saw and evaluated the patient.


I reviewed the resident's note and discussed the case with the resident.


I agree with the resident's findings and plan as documented.








SUBJECTIVE:


Seen and examined, no changes, feeling well





OBJECTIVE:


 Vital Signs - 24 hr











  01/14/20 01/15/20 01/15/20





  21:00 09:00 10:00


 


Temperature   98.1 F


 


Pulse Rate   75


 


Respiratory 18 18 18





Rate   


 


Blood Pressure   152/69


 


O2 Sat by Pulse 98 97 





Oximetry (%)   














  01/15/20 01/15/20





  14:39 14:43


 


Temperature 98.4 F 98.4 F


 


Pulse Rate 78 78


 


Respiratory 18 18





Rate  


 


Blood Pressure 156/83 156/83


 


O2 Sat by Pulse  





Oximetry (%)  








Microbiology





01/13/20 11:30   Urine - Urine Suprapubic   Urine Culture - Preliminary


                            Klebsiella Oxytoca


                            Non Lactose Fermenting Gnb


                            Lactose Fermenting Neg Bacilli#2


01/14/20 10:45   Nares - Mrsa Screen - Right   MRSA Screen - Final


                            NO MRSA ISOLATED


01/14/20 10:45   Nares - Mrsa Screen - Left   MRSA Screen - Final


                            Mr S Aureus





PE:





Constitutional: Yes: Well Nourished, No Distress, Calm


Cardiovascular: Yes: WNL, Regular Rate and Rhythm


Respiratory: Yes: WNL, Regular, CTA Bilaterally


Gastrointestinal: Yes: WNL, Normal Bowel Sounds, Soft, Other (+SP catheter)


Musculoskeletal: Yes: WNL


Extremities: Yes: WNL


Edema: No





 Laboratory Results - last 24 hr











  01/15/20 01/15/20 01/15/20





  05:58 07:40 07:40


 


WBC   4.4 


 


RBC   3.29 L 


 


Hgb   9.2 L 


 


Hct   28.1 L 


 


MCV   85.4 


 


MCH   27.9 


 


MCHC   32.7 


 


RDW   15.0 


 


Plt Count   124 L 


 


MPV   9.2 


 


Absolute Neuts (auto)   3.1 


 


Neutrophils %   71.1  D 


 


Lymphocytes %   20.6  D 


 


Monocytes %   5.2 


 


Eosinophils %   1.4 


 


Basophils %   1.7 


 


Nucleated RBC %   0 


 


Sodium    136


 


Potassium    4.5


 


Chloride    109 H


 


Carbon Dioxide    19 L


 


Anion Gap    8


 


BUN    42.7 H


 


Creatinine    4.1 H


 


Est GFR (CKD-EPI)AfAm    16.79


 


Est GFR (CKD-EPI)NonAf    14.48


 


POC Glucometer  109  


 


Random Glucose    144 H


 


Calcium    8.9


 


Total Bilirubin    0.3


 


AST    17


 


ALT    14


 


Alkaline Phosphatase    132 H


 


Total Protein    6.6


 


Albumin    3.4








Meds and imaging reports reviewed





ASSESSMENT AND PLAN:





65 yo M PMH of HTN, HLD, CKD, metastatic prostate Ca, COPD, prior CVA, Hep C 

who presented to the ED on instruction from Dr. Heck for elevated creatinine, 

acute renal failure.





1) Acute on chronic renal failure


-creatinine unchanged


-renal following





2) UTI


-urine cx LF GNB-likely contaminant


-obs off abx


-ID consulted





3) Met Prostate CA


-CT C/A/P and bone scan reports reviewed


-follow-up with onc





HTN


HLD


COPD


CVA hx


Hep C


AOCD


Mood d/o


DM2





cw current meds


dc home with close op followup





Problem List





- Problems


(1) Acute kidney failure


Code(s): N17.9 - ACUTE KIDNEY FAILURE, UNSPECIFIED   


Qualifiers: 


   Acute renal failure type: unspecified   Qualified Code(s): N17.9 - Acute 

kidney failure, unspecified   





(2) COPD (chronic obstructive pulmonary disease)


Code(s): J44.9 - CHRONIC OBSTRUCTIVE PULMONARY DISEASE, UNSPECIFIED   


Qualifiers: 


   COPD type: unspecified COPD   Qualified Code(s): J44.9 - Chronic obstructive 

pulmonary disease, unspecified   





(3) Chronic kidney disease


Code(s): N18.9 - CHRONIC KIDNEY DISEASE, UNSPECIFIED   


Qualifiers: 


   Chronic kidney disease stage: stage 3 (moderate)   Qualified Code(s): N18.3 

- Chronic kidney disease, stage 3 (moderate)   





(4) Chronic pain


Code(s): G89.29 - OTHER CHRONIC PAIN   





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


Code(s): E11.59 - TYPE 2 DIABETES MELLITUS WITH OTH CIRCULATORY COMPLICATIONS; 

I70.209 - UNSP ATHSCL NATIVE ARTERIES OF EXTREMITIES, UNSP EXTREMITY   





(6) HTN (hypertension)


Code(s): I10 - ESSENTIAL (PRIMARY) HYPERTENSION   


Qualifiers: 


   Hypertension type: essential hypertension   Qualified Code(s): I10 - 

Essential (primary) hypertension   





(7) History of prostate cancer


Code(s): Z85.46 - PERSONAL HISTORY OF MALIGNANT NEOPLASM OF PROSTATE

## 2020-01-15 NOTE — PN
Progress Note (short form)





- Note


Progress Note: 


ID CONSULT DICTATED


+ URINE C/S MIXED ORGANISMS C/W CONTAMINATION


S/P SUPRAPUBIC CATHETER


ROJELIO


METASTATIC PROSTATE CA





OBSERVE OFF ANTIBIOTICS

## 2021-01-05 ENCOUNTER — HOSPITAL ENCOUNTER (EMERGENCY)
Dept: HOSPITAL 74 - JER | Age: 66
Discharge: LEFT BEFORE DISCHARGE | End: 2021-01-05
Payer: COMMERCIAL

## 2021-01-05 VITALS — SYSTOLIC BLOOD PRESSURE: 179 MMHG | HEART RATE: 71 BPM | DIASTOLIC BLOOD PRESSURE: 88 MMHG

## 2021-01-05 VITALS — TEMPERATURE: 97.9 F

## 2021-01-05 VITALS — BODY MASS INDEX: 19.1 KG/M2

## 2021-01-05 DIAGNOSIS — R10.9: Primary | ICD-10-CM

## 2021-01-05 DIAGNOSIS — Z96.0: ICD-10-CM

## 2021-01-05 LAB
ALBUMIN SERPL-MCNC: 3.6 G/DL (ref 3.4–5)
ALP SERPL-CCNC: 220 U/L (ref 45–117)
ALT SERPL-CCNC: 28 U/L (ref 13–61)
ANION GAP SERPL CALC-SCNC: 6 MMOL/L (ref 8–16)
AST SERPL-CCNC: 22 U/L (ref 15–37)
BACTERIA # UR AUTO: (no result) /UL (ref 0–1359)
BASOPHILS # BLD: 0.9 % (ref 0–2)
BILIRUB SERPL-MCNC: 0.3 MG/DL (ref 0.2–1)
BILIRUB UR STRIP.AUTO-MCNC: NEGATIVE MG/DL
BUN SERPL-MCNC: 37.4 MG/DL (ref 7–18)
CALCIUM SERPL-MCNC: 8.5 MG/DL (ref 8.5–10.1)
CASTS URNS QL MICRO: 54 /UL (ref 0–3.1)
CHLORIDE SERPL-SCNC: 104 MMOL/L (ref 98–107)
CO2 SERPL-SCNC: 24 MMOL/L (ref 21–32)
COLOR UR: YELLOW
CREAT SERPL-MCNC: 4.5 MG/DL (ref 0.55–1.3)
DEPRECATED RDW RBC AUTO: 14.5 % (ref 11.9–15.9)
EOSINOPHIL # BLD: 0 % (ref 0–4.5)
EPITH CASTS URNS QL MICRO: >36 /UL (ref 0–25.1)
GLUCOSE SERPL-MCNC: 153 MG/DL (ref 74–106)
HCT VFR BLD CALC: 33.7 % (ref 35.4–49)
HGB BLD-MCNC: 11.3 GM/DL (ref 11.7–16.9)
KETONES UR QL STRIP: NEGATIVE
LEUKOCYTE ESTERASE UR QL STRIP.AUTO: (no result)
LIPASE SERPL-CCNC: 75 U/L (ref 73–393)
LYMPHOCYTES # BLD: 13.2 % (ref 8–40)
MCH RBC QN AUTO: 29.4 PG (ref 25.7–33.7)
MCHC RBC AUTO-ENTMCNC: 33.4 G/DL (ref 32–35.9)
MCV RBC: 88 FL (ref 80–96)
MONOCYTES # BLD AUTO: 4.3 % (ref 3.8–10.2)
NEUTROPHILS # BLD: 81.6 % (ref 42.8–82.8)
NITRITE UR QL STRIP: NEGATIVE
PH UR: 7.5 [PH] (ref 5–8)
PLATELET # BLD AUTO: 207 K/MM3 (ref 134–434)
PMV BLD: 8 FL (ref 7.5–11.1)
PROT SERPL-MCNC: 6.9 G/DL (ref 6.4–8.2)
PROT UR QL STRIP: (no result)
PROT UR QL STRIP: (no result)
RBC # BLD AUTO: 100.7 /UL (ref 0–23.9)
RBC # BLD AUTO: 3.83 M/MM3 (ref 4–5.6)
SODIUM SERPL-SCNC: 135 MMOL/L (ref 136–145)
SP GR UR: 1.01 (ref 1.01–1.03)
UROBILINOGEN UR STRIP-MCNC: 0.2 MG/DL (ref 0.2–1)
WBC # BLD AUTO: 10.7 K/MM3 (ref 4–10)
WBC # UR AUTO: 2045 /UL (ref 0–25.8)

## 2021-01-05 PROCEDURE — 3E0333Z INTRODUCTION OF ANTI-INFLAMMATORY INTO PERIPHERAL VEIN, PERCUTANEOUS APPROACH: ICD-10-PCS

## 2021-01-05 PROCEDURE — 3E033GC INTRODUCTION OF OTHER THERAPEUTIC SUBSTANCE INTO PERIPHERAL VEIN, PERCUTANEOUS APPROACH: ICD-10-PCS

## 2023-11-24 NOTE — DS
Physical Exam: 


SUBJECTIVE: Patient seen and examined. Offers no complaints. No acute events 

overnight. 








OBJECTIVE:





 Vital Signs











 Period  Temp  Pulse  Resp  BP Sys/Roland  Pulse Ox


 


 Last 24 Hr  98.1 F-98.4 F  75-78  18-18  152-156/69-83  97-98








PHYSICAL EXAM





GENERAL: in NAD, comfortable


HEAD: Normal with no signs of trauma.


EYES: PERRL, conjunctiva clear


ENT: moist mucous membranes.


NECK: supple. 


LUNGS: CTA b/l


HEART: RRR


ABDOMEN: Soft, nontender, nondistended, normoactive bowel sounds


EXTREMITIES: 2+ pulses, no edema. 








LABS


 Laboratory Results - last 24 hr











  01/15/20 01/15/20 01/15/20





  05:58 07:40 07:40


 


WBC   4.4 


 


RBC   3.29 L 


 


Hgb   9.2 L 


 


Hct   28.1 L 


 


MCV   85.4 


 


MCH   27.9 


 


MCHC   32.7 


 


RDW   15.0 


 


Plt Count   124 L 


 


MPV   9.2 


 


Absolute Neuts (auto)   3.1 


 


Neutrophils %   71.1  D 


 


Lymphocytes %   20.6  D 


 


Monocytes %   5.2 


 


Eosinophils %   1.4 


 


Basophils %   1.7 


 


Nucleated RBC %   0 


 


Sodium    136


 


Potassium    4.5


 


Chloride    109 H


 


Carbon Dioxide    19 L


 


Anion Gap    8


 


BUN    42.7 H


 


Creatinine    4.1 H


 


Est GFR (CKD-EPI)AfAm    16.79


 


Est GFR (CKD-EPI)NonAf    14.48


 


POC Glucometer  109  


 


Random Glucose    144 H


 


Calcium    8.9


 


Total Bilirubin    0.3


 


AST    17


 


ALT    14


 


Alkaline Phosphatase    132 H


 


Total Protein    6.6


 


Albumin    3.4











HOSPITAL COURSE:





Date of Admission:01/09/20





65 yo M PMH of HTN, HLD, CKD, metastatic prostate Ca, COPD, prior CVA, Hep C 

who presented to the ED on instruction from Dr. Heck for elevated creatinine, 

acute renal failure.





#ROJELIO on CKD


-cr 4.1with no significant change. 


-will need to follow up outpatient per nephro for further workup.


-bicarb 325mg bid


-no indication for HD at this time








+ Urine culture/sensitivity mixed organisms c/w contamination


-ID consulted: to be observed off antibiotics at this time


s/p suprapubic cath





#Met Prostate CA


-CT C/A/P and bone scan done.


-Awaiting results


-follow-up bone scan results outpatient with Dr. De La Cruz. 





#HTN- cont. norvasc 10mg





#HLD- lipitor 10mg





#COPD


#CVA hx


#Hep C


#AOCD


#Mood d/o


#DM2


-cont current management











Date of Discharge: 01/15/20











Minutes to complete discharge: 35





Discharge Summary


Problems reviewed: Yes


Reason For Visit: ACUTE RENAL FAILURE


Current Active Problems





Acute kidney failure (Acute)








Condition: Stable





- Instructions


Diet, Activity, Other Instructions: 


You were here because of acute kidney injury.


You are currently being worked up by the kidney doctor the oncology doctor.


Please follow up with your primary care doctor within this week.


Please follow up with your nephrologist (Dr. Arango), and oncology (Dr. Heck

).





If you develop shortness of breath or chest pain, please go to your nearest 

emergency room.


Referrals: 


Maria R Art MD [Staff Physician] - 1 Week


Joy Montes De Oca MD [Primary Care Provider] - 1 Week


Marlon Arango MD [Staff Physician] - 1 Week


Disposition: HOME





- Home Medications


Comprehensive Discharge Medication List: 


Ambulatory Orders





Docusate Sodium [Colace -] 300 mg PO HS #30 capsule 11/19/18 


Amlodipine Besylate [Norvasc -] 10 mg PO DAILY 10/09/19 


Atorvastatin Ca [Lipitor] 10 mg PO HS 10/09/19 


Glimepiride [Amaryl -] 2 mg PO DAILY 10/09/19 


Insulin Glargine,Hum.rec.anlog [Basaglar Kwikpen U-100] 15 unit SQ HS 10/09/19 


Methadone [Dolophine -] 60 mg PO DAILY@0600 10/09/19 


Multivitamins [Multivit ([facility] Form[facility])] 1 tab PO DAILY #30 tab 10/09/19 


Tamsulosin HCl [Flomax -] 0.4 mg PO DAILY 10/09/19 


Acetaminophen 500 mg PO QID PRN #120 tablet MDD 4 12/04/19 


Ergocalciferol (Vitamin D2) [Vitamin D2] 50,000 unit PO Q7D #4 capsule 12/04/19 


Venlafaxine HCl [Effexor -] 50 mg PO DAILY 01/10/20 


Sodium Bicarbonate - 325 mg PO BID #60 tablet 01/15/20 








This patient is new to me today: Yes


Date on this admission: 01/15/20


Emergency Visit: Yes


ED Registration Date: 01/09/20


Care time: The patient presented to the Emergency Department on the above date 

and was hospitalized for further evaluation of their emergent condition.


Critical Care patient: No





- Discharge Referral


Referred to [facility] No





ATTENDING PHYSICIAN STATEMENT





I saw and evaluated the patient.


I reviewed the resident's note and discussed the case with the resident.


I agree with the resident's findings and plan as documented.








SUBJECTIVE:








OBJECTIVE:








ASSESSMENT AND PLAN:
No